# Patient Record
Sex: FEMALE | Race: WHITE | NOT HISPANIC OR LATINO | Employment: OTHER | URBAN - METROPOLITAN AREA
[De-identification: names, ages, dates, MRNs, and addresses within clinical notes are randomized per-mention and may not be internally consistent; named-entity substitution may affect disease eponyms.]

---

## 2023-10-30 ENCOUNTER — ANCILLARY PROCEDURE (OUTPATIENT)
Dept: RADIOLOGY | Facility: CLINIC | Age: 73
End: 2023-10-30
Payer: MEDICARE

## 2023-10-30 DIAGNOSIS — S72.001D FRACTURE OF UNSPECIFIED PART OF NECK OF RIGHT FEMUR, SUBSEQUENT ENCOUNTER FOR CLOSED FRACTURE WITH ROUTINE HEALING: ICD-10-CM

## 2023-10-30 PROCEDURE — 73110 X-RAY EXAM OF WRIST: CPT | Mod: RT

## 2023-10-30 PROCEDURE — 73502 X-RAY EXAM HIP UNI 2-3 VIEWS: CPT | Mod: RT

## 2023-10-30 PROCEDURE — 73552 X-RAY EXAM OF FEMUR 2/>: CPT | Mod: RT

## 2023-10-30 PROCEDURE — 73110 X-RAY EXAM OF WRIST: CPT | Mod: RIGHT SIDE | Performed by: RADIOLOGY

## 2023-10-30 PROCEDURE — 73502 X-RAY EXAM HIP UNI 2-3 VIEWS: CPT | Mod: RIGHT SIDE | Performed by: RADIOLOGY

## 2023-10-30 PROCEDURE — 73552 X-RAY EXAM OF FEMUR 2/>: CPT | Mod: RIGHT SIDE | Performed by: RADIOLOGY

## 2024-05-06 ENCOUNTER — LAB REQUISITION (OUTPATIENT)
Dept: LAB | Facility: HOSPITAL | Age: 74
End: 2024-05-06
Payer: MEDICARE

## 2024-05-06 DIAGNOSIS — E04.2 NONTOXIC MULTINODULAR GOITER: ICD-10-CM

## 2024-05-06 PROCEDURE — 88112 CYTOPATH CELL ENHANCE TECH: CPT

## 2024-05-06 PROCEDURE — 88112 CYTOPATH CELL ENHANCE TECH: CPT | Performed by: PATHOLOGY

## 2024-05-07 LAB
LABORATORY COMMENT REPORT: NORMAL
LABORATORY COMMENT REPORT: NORMAL
PATH REPORT.FINAL DX SPEC: NORMAL
PATH REPORT.GROSS SPEC: NORMAL
PATH REPORT.RELEVANT HX SPEC: NORMAL
PATH REPORT.TOTAL CANCER: NORMAL

## 2024-05-14 DIAGNOSIS — K22.719 BARRETT'S ESOPHAGUS WITH DYSPLASIA, UNSPECIFIED: Primary | ICD-10-CM

## 2024-06-17 ENCOUNTER — APPOINTMENT (OUTPATIENT)
Dept: GASTROENTEROLOGY | Facility: HOSPITAL | Age: 74
End: 2024-06-17
Payer: COMMERCIAL

## 2024-10-23 ENCOUNTER — LAB REQUISITION (OUTPATIENT)
Dept: LAB | Facility: HOSPITAL | Age: 74
End: 2024-10-23
Payer: COMMERCIAL

## 2024-10-23 DIAGNOSIS — J18.9 PNEUMONIA, UNSPECIFIED ORGANISM: ICD-10-CM

## 2024-10-23 PROCEDURE — 88342 IMHCHEM/IMCYTCHM 1ST ANTB: CPT | Performed by: PATHOLOGY

## 2024-10-23 PROCEDURE — 88342 IMHCHEM/IMCYTCHM 1ST ANTB: CPT

## 2024-10-23 PROCEDURE — 88112 CYTOPATH CELL ENHANCE TECH: CPT

## 2024-10-23 PROCEDURE — 88112 CYTOPATH CELL ENHANCE TECH: CPT | Mod: TC,MCY,OUT,ELYLAB | Performed by: INTERNAL MEDICINE

## 2024-10-23 PROCEDURE — 88341 IMHCHEM/IMCYTCHM EA ADD ANTB: CPT

## 2024-10-23 PROCEDURE — 88341 IMHCHEM/IMCYTCHM EA ADD ANTB: CPT | Performed by: PATHOLOGY

## 2024-10-23 PROCEDURE — 88305 TISSUE EXAM BY PATHOLOGIST: CPT | Performed by: PATHOLOGY

## 2024-10-23 PROCEDURE — 88305 TISSUE EXAM BY PATHOLOGIST: CPT

## 2024-10-23 PROCEDURE — 88112 CYTOPATH CELL ENHANCE TECH: CPT | Performed by: PATHOLOGY

## 2024-11-01 PROCEDURE — 88112 CYTOPATH CELL ENHANCE TECH: CPT | Mod: TC,OUT | Performed by: INTERNAL MEDICINE

## 2024-11-01 PROCEDURE — 88305 TISSUE EXAM BY PATHOLOGIST: CPT | Performed by: PATHOLOGY

## 2024-11-01 PROCEDURE — 88305 TISSUE EXAM BY PATHOLOGIST: CPT

## 2024-11-01 PROCEDURE — 88112 CYTOPATH CELL ENHANCE TECH: CPT

## 2024-11-01 PROCEDURE — 88112 CYTOPATH CELL ENHANCE TECH: CPT | Performed by: PATHOLOGY

## 2024-11-03 ENCOUNTER — LAB REQUISITION (OUTPATIENT)
Dept: LAB | Facility: HOSPITAL | Age: 74
End: 2024-11-03
Payer: MEDICARE

## 2024-11-03 DIAGNOSIS — J90 PLEURAL EFFUSION, NOT ELSEWHERE CLASSIFIED: ICD-10-CM

## 2024-11-07 LAB
LAB AP ASR DISCLAIMER: NORMAL
LABORATORY COMMENT REPORT: NORMAL
LABORATORY COMMENT REPORT: NORMAL
PATH REPORT.FINAL DX SPEC: NORMAL
PATH REPORT.GROSS SPEC: NORMAL
PATH REPORT.TOTAL CANCER: NORMAL

## 2024-12-27 PROBLEM — D64.9 ANEMIA: Status: ACTIVE | Noted: 2024-12-27

## 2024-12-28 ENCOUNTER — OFFICE VISIT (OUTPATIENT)
Dept: SURGICAL ONCOLOGY | Facility: HOSPITAL | Age: 74
DRG: 809 | End: 2024-12-28
Payer: COMMERCIAL

## 2024-12-28 ENCOUNTER — HOSPITAL ENCOUNTER (INPATIENT)
Facility: HOSPITAL | Age: 74
DRG: 809 | End: 2024-12-28
Attending: STUDENT IN AN ORGANIZED HEALTH CARE EDUCATION/TRAINING PROGRAM | Admitting: STUDENT IN AN ORGANIZED HEALTH CARE EDUCATION/TRAINING PROGRAM
Payer: COMMERCIAL

## 2024-12-28 ENCOUNTER — APPOINTMENT (OUTPATIENT)
Dept: RADIOLOGY | Facility: HOSPITAL | Age: 74
DRG: 809 | End: 2024-12-28
Payer: COMMERCIAL

## 2024-12-28 DIAGNOSIS — D64.9 ANEMIA: Primary | ICD-10-CM

## 2024-12-28 DIAGNOSIS — D61.818 PANCYTOPENIA: ICD-10-CM

## 2024-12-28 DIAGNOSIS — J90 PLEURAL EFFUSION: ICD-10-CM

## 2024-12-28 DIAGNOSIS — C34.90: Primary | ICD-10-CM

## 2024-12-28 LAB
ABO GROUP (TYPE) IN BLOOD: NORMAL
ALBUMIN SERPL BCP-MCNC: 3 G/DL (ref 3.4–5)
ALP SERPL-CCNC: 141 U/L (ref 33–136)
ALT SERPL W P-5'-P-CCNC: 13 U/L (ref 7–45)
ANION GAP SERPL CALC-SCNC: 13 MMOL/L (ref 10–20)
ANTIBODY SCREEN: NORMAL
APTT PPP: 29 SECONDS (ref 27–38)
AST SERPL W P-5'-P-CCNC: 11 U/L (ref 9–39)
BASOPHILS # BLD MANUAL: 0.01 X10*3/UL (ref 0–0.1)
BASOPHILS NFR BLD MANUAL: 1 %
BILIRUB DIRECT SERPL-MCNC: 0.2 MG/DL (ref 0–0.3)
BILIRUB SERPL-MCNC: 1.1 MG/DL (ref 0–1.2)
BUN SERPL-MCNC: 12 MG/DL (ref 6–23)
CALCIUM SERPL-MCNC: 7.4 MG/DL (ref 8.6–10.6)
CHLORIDE SERPL-SCNC: 92 MMOL/L (ref 98–107)
CO2 SERPL-SCNC: 31 MMOL/L (ref 21–32)
CREAT SERPL-MCNC: 0.62 MG/DL (ref 0.5–1.05)
EGFRCR SERPLBLD CKD-EPI 2021: >90 ML/MIN/1.73M*2
EOSINOPHIL # BLD MANUAL: 0.01 X10*3/UL (ref 0–0.4)
EOSINOPHIL NFR BLD MANUAL: 2 %
ERYTHROCYTE [DISTWIDTH] IN BLOOD BY AUTOMATED COUNT: 15.3 % (ref 11.5–14.5)
ERYTHROCYTE [DISTWIDTH] IN BLOOD BY AUTOMATED COUNT: 15.6 % (ref 11.5–14.5)
EST. AVERAGE GLUCOSE BLD GHB EST-MCNC: 143 MG/DL
GLUCOSE SERPL-MCNC: 127 MG/DL (ref 74–99)
HBA1C MFR BLD: 6.6 %
HCT VFR BLD AUTO: 22.4 % (ref 36–46)
HCT VFR BLD AUTO: 23.9 % (ref 36–46)
HGB BLD-MCNC: 7.5 G/DL (ref 12–16)
HGB BLD-MCNC: 8.6 G/DL (ref 12–16)
IMM GRANULOCYTES # BLD AUTO: 0 X10*3/UL (ref 0–0.5)
IMM GRANULOCYTES NFR BLD AUTO: 0 % (ref 0–0.9)
INR PPP: 1.2 (ref 0.9–1.1)
LYMPHOCYTES # BLD MANUAL: 0.43 X10*3/UL (ref 0.8–3)
LYMPHOCYTES NFR BLD MANUAL: 85.1 %
MCH RBC QN AUTO: 26.5 PG (ref 26–34)
MCH RBC QN AUTO: 26.7 PG (ref 26–34)
MCHC RBC AUTO-ENTMCNC: 33.5 G/DL (ref 32–36)
MCHC RBC AUTO-ENTMCNC: 36 G/DL (ref 32–36)
MCV RBC AUTO: 74 FL (ref 80–100)
MCV RBC AUTO: 79 FL (ref 80–100)
MONOCYTES # BLD MANUAL: 0.01 X10*3/UL (ref 0.05–0.8)
MONOCYTES NFR BLD MANUAL: 2 %
NEUTS SEG # BLD MANUAL: 0.04 X10*3/UL (ref 1.6–5)
NEUTS SEG NFR BLD MANUAL: 8.9 %
NRBC BLD-RTO: 0 /100 WBCS (ref 0–0)
NRBC BLD-RTO: 0 /100 WBCS (ref 0–0)
PHOSPHATE SERPL-MCNC: 2.6 MG/DL (ref 2.5–4.9)
PLATELET # BLD AUTO: 13 X10*3/UL (ref 150–450)
PLATELET # BLD AUTO: 19 X10*3/UL (ref 150–450)
POTASSIUM SERPL-SCNC: 3.4 MMOL/L (ref 3.5–5.3)
PROT SERPL-MCNC: 5.3 G/DL (ref 6.4–8.2)
PROTHROMBIN TIME: 13.2 SECONDS (ref 9.8–12.8)
RBC # BLD AUTO: 2.83 X10*6/UL (ref 4–5.2)
RBC # BLD AUTO: 3.22 X10*6/UL (ref 4–5.2)
RBC MORPH BLD: ABNORMAL
RH FACTOR (ANTIGEN D): NORMAL
SODIUM SERPL-SCNC: 133 MMOL/L (ref 136–145)
TOTAL CELLS COUNTED BLD: 101
VARIANT LYMPHS # BLD MANUAL: 0.01 X10*3/UL (ref 0–0.3)
VARIANT LYMPHS NFR BLD: 1 %
WBC # BLD AUTO: 0.5 X10*3/UL (ref 4.4–11.3)
WBC # BLD AUTO: 0.6 X10*3/UL (ref 4.4–11.3)

## 2024-12-28 PROCEDURE — 2500000001 HC RX 250 WO HCPCS SELF ADMINISTERED DRUGS (ALT 637 FOR MEDICARE OP)

## 2024-12-28 PROCEDURE — 80048 BASIC METABOLIC PNL TOTAL CA: CPT

## 2024-12-28 PROCEDURE — 93005 ELECTROCARDIOGRAM TRACING: CPT

## 2024-12-28 PROCEDURE — 86901 BLOOD TYPING SEROLOGIC RH(D): CPT

## 2024-12-28 PROCEDURE — 36415 COLL VENOUS BLD VENIPUNCTURE: CPT

## 2024-12-28 PROCEDURE — 82248 BILIRUBIN DIRECT: CPT

## 2024-12-28 PROCEDURE — 84100 ASSAY OF PHOSPHORUS: CPT

## 2024-12-28 PROCEDURE — 85027 COMPLETE CBC AUTOMATED: CPT

## 2024-12-28 PROCEDURE — 85610 PROTHROMBIN TIME: CPT

## 2024-12-28 PROCEDURE — 71045 X-RAY EXAM CHEST 1 VIEW: CPT | Performed by: STUDENT IN AN ORGANIZED HEALTH CARE EDUCATION/TRAINING PROGRAM

## 2024-12-28 PROCEDURE — 85007 BL SMEAR W/DIFF WBC COUNT: CPT

## 2024-12-28 PROCEDURE — 71045 X-RAY EXAM CHEST 1 VIEW: CPT

## 2024-12-28 PROCEDURE — 2500000002 HC RX 250 W HCPCS SELF ADMINISTERED DRUGS (ALT 637 FOR MEDICARE OP, ALT 636 FOR OP/ED)

## 2024-12-28 PROCEDURE — 99223 1ST HOSP IP/OBS HIGH 75: CPT | Performed by: NURSE PRACTITIONER

## 2024-12-28 PROCEDURE — 02HV33Z INSERTION OF INFUSION DEVICE INTO SUPERIOR VENA CAVA, PERCUTANEOUS APPROACH: ICD-10-PCS

## 2024-12-28 PROCEDURE — 83036 HEMOGLOBIN GLYCOSYLATED A1C: CPT

## 2024-12-28 PROCEDURE — 2780000003 HC OR 278 NO HCPCS

## 2024-12-28 PROCEDURE — 2500000004 HC RX 250 GENERAL PHARMACY W/ HCPCS (ALT 636 FOR OP/ED)

## 2024-12-28 PROCEDURE — 1200000003 HC ONCOLOGY  ROOM WITH TELEMETRY DAILY

## 2024-12-28 PROCEDURE — C1751 CATH, INF, PER/CENT/MIDLINE: HCPCS

## 2024-12-28 PROCEDURE — 93010 ELECTROCARDIOGRAM REPORT: CPT | Performed by: INTERNAL MEDICINE

## 2024-12-28 PROCEDURE — 99223 1ST HOSP IP/OBS HIGH 75: CPT | Performed by: STUDENT IN AN ORGANIZED HEALTH CARE EDUCATION/TRAINING PROGRAM

## 2024-12-28 PROCEDURE — 36410 VNPNXR 3YR/> PHY/QHP DX/THER: CPT

## 2024-12-28 RX ORDER — ONDANSETRON 4 MG/1
1 TABLET, FILM COATED ORAL EVERY 6 HOURS PRN
Status: ON HOLD | COMMUNITY
Start: 2024-11-13 | End: 2025-01-04

## 2024-12-28 RX ORDER — POTASSIUM CHLORIDE 20 MEQ/1
20 TABLET, EXTENDED RELEASE ORAL ONCE
Status: COMPLETED | OUTPATIENT
Start: 2024-12-28 | End: 2024-12-28

## 2024-12-28 RX ORDER — ALBUTEROL SULFATE 0.83 MG/ML
2.5 SOLUTION RESPIRATORY (INHALATION) EVERY 4 HOURS PRN
Status: DISCONTINUED | OUTPATIENT
Start: 2024-12-28 | End: 2025-01-04 | Stop reason: HOSPADM

## 2024-12-28 RX ORDER — ONDANSETRON 4 MG/1
4 TABLET, ORALLY DISINTEGRATING ORAL EVERY 8 HOURS PRN
Status: DISCONTINUED | OUTPATIENT
Start: 2024-12-28 | End: 2024-12-28

## 2024-12-28 RX ORDER — HYDROMORPHONE HYDROCHLORIDE 2 MG/1
2 TABLET ORAL EVERY 4 HOURS PRN
Status: DISCONTINUED | OUTPATIENT
Start: 2024-12-28 | End: 2024-12-28

## 2024-12-28 RX ORDER — OLANZAPINE 5 MG/1
2.5 TABLET ORAL NIGHTLY
Status: DISCONTINUED | OUTPATIENT
Start: 2024-12-28 | End: 2025-01-04 | Stop reason: HOSPADM

## 2024-12-28 RX ORDER — HYDROMORPHONE HYDROCHLORIDE 2 MG/1
2 TABLET ORAL ONCE
Status: COMPLETED | OUTPATIENT
Start: 2024-12-28 | End: 2024-12-28

## 2024-12-28 RX ORDER — DEXTROSE 50 % IN WATER (D50W) INTRAVENOUS SYRINGE
25
Status: DISCONTINUED | OUTPATIENT
Start: 2024-12-28 | End: 2025-01-04 | Stop reason: HOSPADM

## 2024-12-28 RX ORDER — INSULIN LISPRO 100 [IU]/ML
INJECTION, SOLUTION INTRAVENOUS; SUBCUTANEOUS
Status: ON HOLD | COMMUNITY
End: 2025-01-04

## 2024-12-28 RX ORDER — LOSARTAN POTASSIUM 25 MG/1
25 TABLET ORAL DAILY
Status: DISCONTINUED | OUTPATIENT
Start: 2024-12-28 | End: 2025-01-04 | Stop reason: HOSPADM

## 2024-12-28 RX ORDER — MIRTAZAPINE 15 MG/1
7.5 TABLET, FILM COATED ORAL NIGHTLY
Status: DISCONTINUED | OUTPATIENT
Start: 2024-12-28 | End: 2024-12-28

## 2024-12-28 RX ORDER — ONDANSETRON 4 MG/1
4 TABLET, ORALLY DISINTEGRATING ORAL EVERY 6 HOURS PRN
Status: DISCONTINUED | OUTPATIENT
Start: 2024-12-28 | End: 2025-01-04 | Stop reason: HOSPADM

## 2024-12-28 RX ORDER — ACYCLOVIR 400 MG/1
800 TABLET ORAL
Status: DISCONTINUED | OUTPATIENT
Start: 2024-12-28 | End: 2025-01-04 | Stop reason: HOSPADM

## 2024-12-28 RX ORDER — HYDROXYZINE HYDROCHLORIDE 25 MG/1
25 TABLET, FILM COATED ORAL EVERY 8 HOURS PRN
Status: DISCONTINUED | OUTPATIENT
Start: 2024-12-28 | End: 2025-01-04 | Stop reason: HOSPADM

## 2024-12-28 RX ORDER — PANTOPRAZOLE SODIUM 40 MG/1
40 TABLET, DELAYED RELEASE ORAL
Status: DISCONTINUED | OUTPATIENT
Start: 2024-12-28 | End: 2024-12-28

## 2024-12-28 RX ORDER — ACYCLOVIR 800 MG/1
800 TABLET ORAL DAILY
Status: ON HOLD | COMMUNITY
End: 2025-01-04

## 2024-12-28 RX ORDER — HEPARIN SODIUM 5000 [USP'U]/ML
5000 INJECTION, SOLUTION INTRAVENOUS; SUBCUTANEOUS EVERY 8 HOURS
Status: DISCONTINUED | OUTPATIENT
Start: 2024-12-28 | End: 2024-12-28

## 2024-12-28 RX ORDER — POTASSIUM CHLORIDE 14.9 MG/ML
20 INJECTION INTRAVENOUS ONCE
Status: DISCONTINUED | OUTPATIENT
Start: 2024-12-28 | End: 2024-12-28

## 2024-12-28 RX ORDER — PANTOPRAZOLE SODIUM 40 MG/10ML
40 INJECTION, POWDER, LYOPHILIZED, FOR SOLUTION INTRAVENOUS 2 TIMES DAILY
Status: DISCONTINUED | OUTPATIENT
Start: 2024-12-28 | End: 2024-12-28

## 2024-12-28 RX ORDER — OMEPRAZOLE 40 MG/1
40 CAPSULE, DELAYED RELEASE ORAL
Status: ON HOLD | COMMUNITY
End: 2025-01-04

## 2024-12-28 RX ORDER — BACLOFEN 5 MG/1
1 TABLET ORAL EVERY 8 HOURS PRN
Status: ON HOLD | COMMUNITY
Start: 2024-11-13 | End: 2025-01-04

## 2024-12-28 RX ORDER — INSULIN GLARGINE 100 [IU]/ML
30 INJECTION, SOLUTION SUBCUTANEOUS NIGHTLY
Status: ON HOLD | COMMUNITY
End: 2025-01-04

## 2024-12-28 RX ORDER — LORAZEPAM 0.5 MG/1
0.5 TABLET ORAL ONCE
Status: COMPLETED | OUTPATIENT
Start: 2024-12-28 | End: 2024-12-28

## 2024-12-28 RX ORDER — HYDROMORPHONE HYDROCHLORIDE 2 MG/1
2 TABLET ORAL EVERY 4 HOURS
Status: DISCONTINUED | OUTPATIENT
Start: 2024-12-28 | End: 2024-12-29

## 2024-12-28 RX ORDER — LIDOCAINE HYDROCHLORIDE 10 MG/ML
5 INJECTION, SOLUTION INFILTRATION; PERINEURAL ONCE
Status: DISCONTINUED | OUTPATIENT
Start: 2024-12-28 | End: 2024-12-31

## 2024-12-28 RX ORDER — HYDROMORPHONE HYDROCHLORIDE 2 MG/1
2 TABLET ORAL
Status: DISCONTINUED | OUTPATIENT
Start: 2024-12-28 | End: 2024-12-28

## 2024-12-28 RX ORDER — ALBUTEROL SULFATE 90 UG/1
2 INHALANT RESPIRATORY (INHALATION) EVERY 4 HOURS PRN
Status: ON HOLD | COMMUNITY
End: 2025-01-04

## 2024-12-28 RX ORDER — DEXTROSE 50 % IN WATER (D50W) INTRAVENOUS SYRINGE
12.5
Status: DISCONTINUED | OUTPATIENT
Start: 2024-12-28 | End: 2025-01-04 | Stop reason: HOSPADM

## 2024-12-28 RX ORDER — METHOCARBAMOL 500 MG/1
500 TABLET, FILM COATED ORAL EVERY 8 HOURS PRN
Status: DISCONTINUED | OUTPATIENT
Start: 2024-12-28 | End: 2025-01-04 | Stop reason: HOSPADM

## 2024-12-28 RX ORDER — AMLODIPINE BESYLATE 10 MG/1
1 TABLET ORAL DAILY
Status: ON HOLD | COMMUNITY
Start: 2024-11-14 | End: 2025-01-04

## 2024-12-28 RX ORDER — FUROSEMIDE 40 MG/1
40 TABLET ORAL
Status: DISCONTINUED | OUTPATIENT
Start: 2024-12-28 | End: 2024-12-28

## 2024-12-28 RX ORDER — LIDOCAINE 560 MG/1
2 PATCH PERCUTANEOUS; TOPICAL; TRANSDERMAL DAILY
Status: DISCONTINUED | OUTPATIENT
Start: 2024-12-28 | End: 2025-01-04 | Stop reason: HOSPADM

## 2024-12-28 RX ORDER — POLYETHYLENE GLYCOL 3350 17 G/17G
17 POWDER, FOR SOLUTION ORAL DAILY
Status: DISCONTINUED | OUTPATIENT
Start: 2024-12-28 | End: 2024-12-30

## 2024-12-28 RX ORDER — LOSARTAN POTASSIUM 25 MG/1
25 TABLET ORAL DAILY
Status: ON HOLD | COMMUNITY
End: 2025-01-04

## 2024-12-28 RX ORDER — PANTOPRAZOLE SODIUM 40 MG/1
40 TABLET, DELAYED RELEASE ORAL
Status: DISCONTINUED | OUTPATIENT
Start: 2024-12-29 | End: 2025-01-04 | Stop reason: HOSPADM

## 2024-12-28 RX ORDER — FUROSEMIDE 40 MG/1
40 TABLET ORAL ONCE
Status: DISCONTINUED | OUTPATIENT
Start: 2024-12-28 | End: 2024-12-28

## 2024-12-28 RX ORDER — HYDROMORPHONE HYDROCHLORIDE 1 MG/ML
0.4 INJECTION, SOLUTION INTRAMUSCULAR; INTRAVENOUS; SUBCUTANEOUS EVERY 4 HOURS PRN
Status: DISCONTINUED | OUTPATIENT
Start: 2024-12-28 | End: 2025-01-04 | Stop reason: HOSPADM

## 2024-12-28 RX ORDER — ASPIRIN 81 MG/1
81 TABLET ORAL DAILY
Status: ON HOLD | COMMUNITY
End: 2025-01-04

## 2024-12-28 RX ORDER — AMLODIPINE BESYLATE 10 MG/1
10 TABLET ORAL DAILY
Status: DISCONTINUED | OUTPATIENT
Start: 2024-12-29 | End: 2025-01-04 | Stop reason: HOSPADM

## 2024-12-28 RX ORDER — HYDRALAZINE HYDROCHLORIDE 10 MG/1
10 TABLET, FILM COATED ORAL 3 TIMES DAILY
Status: DISCONTINUED | OUTPATIENT
Start: 2024-12-28 | End: 2024-12-28

## 2024-12-28 RX ORDER — LABETALOL 200 MG/1
200 TABLET, FILM COATED ORAL 2 TIMES DAILY
Status: DISCONTINUED | OUTPATIENT
Start: 2024-12-28 | End: 2025-01-04 | Stop reason: HOSPADM

## 2024-12-28 RX ORDER — BACLOFEN 10 MG/1
5 TABLET ORAL EVERY 8 HOURS PRN
Status: DISCONTINUED | OUTPATIENT
Start: 2024-12-28 | End: 2024-12-28

## 2024-12-28 RX ORDER — LABETALOL 200 MG/1
1 TABLET, FILM COATED ORAL
Status: ON HOLD | COMMUNITY
Start: 2024-10-11 | End: 2025-01-04

## 2024-12-28 RX ORDER — ACETAMINOPHEN 500 MG
5 TABLET ORAL DAILY
Status: DISCONTINUED | OUTPATIENT
Start: 2024-12-28 | End: 2025-01-04 | Stop reason: HOSPADM

## 2024-12-28 RX ORDER — HYDROMORPHONE HYDROCHLORIDE 2 MG/1
2 TABLET ORAL
Status: ON HOLD | COMMUNITY
End: 2025-01-04

## 2024-12-28 RX ORDER — FUROSEMIDE 20 MG/1
2 TABLET ORAL
Status: ON HOLD | COMMUNITY
Start: 2024-10-11 | End: 2025-01-04

## 2024-12-28 RX ORDER — FUROSEMIDE 40 MG/1
40 TABLET ORAL DAILY
Status: DISCONTINUED | OUTPATIENT
Start: 2024-12-29 | End: 2024-12-31

## 2024-12-28 RX ORDER — ACETAMINOPHEN 500 MG
1 TABLET ORAL EVERY 24 HOURS
Status: ON HOLD | COMMUNITY
End: 2025-01-04

## 2024-12-28 RX ORDER — INSULIN LISPRO 100 [IU]/ML
0-5 INJECTION, SOLUTION INTRAVENOUS; SUBCUTANEOUS
Status: DISCONTINUED | OUTPATIENT
Start: 2024-12-28 | End: 2025-01-04 | Stop reason: HOSPADM

## 2024-12-28 RX ORDER — CALCIUM CARBONATE/VITAMIN D3 600MG-5MCG
1 TABLET ORAL 2 TIMES DAILY
Status: ON HOLD | COMMUNITY
End: 2025-01-04

## 2024-12-28 RX ORDER — INSULIN GLARGINE 100 [IU]/ML
15 INJECTION, SOLUTION SUBCUTANEOUS NIGHTLY
Status: DISCONTINUED | OUTPATIENT
Start: 2024-12-28 | End: 2024-12-31 | Stop reason: ALTCHOICE

## 2024-12-28 RX ADMIN — PANTOPRAZOLE SODIUM 40 MG: 40 INJECTION, POWDER, FOR SOLUTION INTRAVENOUS at 08:03

## 2024-12-28 RX ADMIN — HYDROMORPHONE HYDROCHLORIDE 2 MG: 2 TABLET ORAL at 02:10

## 2024-12-28 RX ADMIN — Medication 5 MG: at 02:42

## 2024-12-28 RX ADMIN — FUROSEMIDE 40 MG: 40 TABLET ORAL at 07:56

## 2024-12-28 RX ADMIN — Medication 5 MG: at 18:40

## 2024-12-28 RX ADMIN — POTASSIUM CHLORIDE 20 MEQ: 1500 TABLET, EXTENDED RELEASE ORAL at 10:00

## 2024-12-28 RX ADMIN — HYDROMORPHONE HYDROCHLORIDE 2 MG: 2 TABLET ORAL at 11:58

## 2024-12-28 RX ADMIN — ONDANSETRON 4 MG: 4 TABLET, ORALLY DISINTEGRATING ORAL at 12:33

## 2024-12-28 RX ADMIN — LABETALOL HYDROCHLORIDE 200 MG: 200 TABLET, FILM COATED ORAL at 08:08

## 2024-12-28 RX ADMIN — LORAZEPAM 0.5 MG: 0.5 TABLET ORAL at 17:27

## 2024-12-28 RX ADMIN — LABETALOL HYDROCHLORIDE 200 MG: 200 TABLET, FILM COATED ORAL at 21:40

## 2024-12-28 RX ADMIN — HYDROMORPHONE HYDROCHLORIDE 2 MG: 2 TABLET ORAL at 10:00

## 2024-12-28 RX ADMIN — LOSARTAN POTASSIUM 25 MG: 25 TABLET, FILM COATED ORAL at 08:07

## 2024-12-28 RX ADMIN — POTASSIUM CHLORIDE 20 MEQ: 14.9 INJECTION, SOLUTION INTRAVENOUS at 07:56

## 2024-12-28 SDOH — SOCIAL STABILITY: SOCIAL INSECURITY: DO YOU FEEL UNSAFE GOING BACK TO THE PLACE WHERE YOU ARE LIVING?: NO

## 2024-12-28 SDOH — SOCIAL STABILITY: SOCIAL INSECURITY
WITHIN THE LAST YEAR, HAVE YOU BEEN KICKED, HIT, SLAPPED, OR OTHERWISE PHYSICALLY HURT BY YOUR PARTNER OR EX-PARTNER?: NO

## 2024-12-28 SDOH — SOCIAL STABILITY: SOCIAL INSECURITY: DOES ANYONE TRY TO KEEP YOU FROM HAVING/CONTACTING OTHER FRIENDS OR DOING THINGS OUTSIDE YOUR HOME?: NO

## 2024-12-28 SDOH — SOCIAL STABILITY: SOCIAL INSECURITY: HAS ANYONE EVER THREATENED TO HURT YOUR FAMILY OR YOUR PETS?: NO

## 2024-12-28 SDOH — SOCIAL STABILITY: SOCIAL INSECURITY
WITHIN THE LAST YEAR, HAVE YOU BEEN RAPED OR FORCED TO HAVE ANY KIND OF SEXUAL ACTIVITY BY YOUR PARTNER OR EX-PARTNER?: NO

## 2024-12-28 SDOH — ECONOMIC STABILITY: FOOD INSECURITY: WITHIN THE PAST 12 MONTHS, YOU WORRIED THAT YOUR FOOD WOULD RUN OUT BEFORE YOU GOT THE MONEY TO BUY MORE.: NEVER TRUE

## 2024-12-28 SDOH — SOCIAL STABILITY: SOCIAL INSECURITY: DO YOU FEEL ANYONE HAS EXPLOITED OR TAKEN ADVANTAGE OF YOU FINANCIALLY OR OF YOUR PERSONAL PROPERTY?: NO

## 2024-12-28 SDOH — ECONOMIC STABILITY: INCOME INSECURITY: IN THE PAST 12 MONTHS HAS THE ELECTRIC, GAS, OIL, OR WATER COMPANY THREATENED TO SHUT OFF SERVICES IN YOUR HOME?: NO

## 2024-12-28 SDOH — SOCIAL STABILITY: SOCIAL INSECURITY: HAVE YOU HAD ANY THOUGHTS OF HARMING ANYONE ELSE?: NO

## 2024-12-28 SDOH — SOCIAL STABILITY: SOCIAL INSECURITY: WERE YOU ABLE TO COMPLETE ALL THE BEHAVIORAL HEALTH SCREENINGS?: YES

## 2024-12-28 SDOH — SOCIAL STABILITY: SOCIAL INSECURITY: ARE THERE ANY APPARENT SIGNS OF INJURIES/BEHAVIORS THAT COULD BE RELATED TO ABUSE/NEGLECT?: NO

## 2024-12-28 SDOH — SOCIAL STABILITY: SOCIAL INSECURITY: WITHIN THE LAST YEAR, HAVE YOU BEEN HUMILIATED OR EMOTIONALLY ABUSED IN OTHER WAYS BY YOUR PARTNER OR EX-PARTNER?: NO

## 2024-12-28 SDOH — SOCIAL STABILITY: SOCIAL INSECURITY: ABUSE: ADULT

## 2024-12-28 SDOH — ECONOMIC STABILITY: FOOD INSECURITY: WITHIN THE PAST 12 MONTHS, THE FOOD YOU BOUGHT JUST DIDN'T LAST AND YOU DIDN'T HAVE MONEY TO GET MORE.: NEVER TRUE

## 2024-12-28 SDOH — SOCIAL STABILITY: SOCIAL INSECURITY: WITHIN THE LAST YEAR, HAVE YOU BEEN AFRAID OF YOUR PARTNER OR EX-PARTNER?: NO

## 2024-12-28 SDOH — SOCIAL STABILITY: SOCIAL INSECURITY: HAVE YOU HAD THOUGHTS OF HARMING ANYONE ELSE?: NO

## 2024-12-28 SDOH — SOCIAL STABILITY: SOCIAL INSECURITY: ARE YOU OR HAVE YOU BEEN THREATENED OR ABUSED PHYSICALLY, EMOTIONALLY, OR SEXUALLY BY ANYONE?: NO

## 2024-12-28 ASSESSMENT — COGNITIVE AND FUNCTIONAL STATUS - GENERAL
CLIMB 3 TO 5 STEPS WITH RAILING: TOTAL
MOVING TO AND FROM BED TO CHAIR: A LOT
TOILETING: A LITTLE
DAILY ACTIVITIY SCORE: 20
DRESSING REGULAR LOWER BODY CLOTHING: A LITTLE
CLIMB 3 TO 5 STEPS WITH RAILING: TOTAL
DAILY ACTIVITIY SCORE: 18
MOBILITY SCORE: 13
HELP NEEDED FOR BATHING: A LOT
TURNING FROM BACK TO SIDE WHILE IN FLAT BAD: A LITTLE
STANDING UP FROM CHAIR USING ARMS: A LOT
TOILETING: A LOT
DRESSING REGULAR UPPER BODY CLOTHING: A LITTLE
STANDING UP FROM CHAIR USING ARMS: A LOT
TURNING FROM BACK TO SIDE WHILE IN FLAT BAD: A LITTLE
MOVING FROM LYING ON BACK TO SITTING ON SIDE OF FLAT BED WITH BEDRAILS: A LITTLE
TOILETING: A LOT
MOBILITY SCORE: 14
MOVING FROM LYING ON BACK TO SITTING ON SIDE OF FLAT BED WITH BEDRAILS: A LITTLE
STANDING UP FROM CHAIR USING ARMS: A LOT
DRESSING REGULAR UPPER BODY CLOTHING: A LITTLE
HELP NEEDED FOR BATHING: A LITTLE
HELP NEEDED FOR BATHING: A LOT
WALKING IN HOSPITAL ROOM: A LOT
DRESSING REGULAR UPPER BODY CLOTHING: A LITTLE
DAILY ACTIVITIY SCORE: 18
CLIMB 3 TO 5 STEPS WITH RAILING: A LOT
DRESSING REGULAR LOWER BODY CLOTHING: A LITTLE
PATIENT BASELINE BEDBOUND: NO
TURNING FROM BACK TO SIDE WHILE IN FLAT BAD: A LITTLE
MOVING FROM LYING ON BACK TO SITTING ON SIDE OF FLAT BED WITH BEDRAILS: A LITTLE
MOVING TO AND FROM BED TO CHAIR: A LOT
MOBILITY SCORE: 13
MOVING TO AND FROM BED TO CHAIR: A LOT
WALKING IN HOSPITAL ROOM: A LOT
WALKING IN HOSPITAL ROOM: A LOT
DRESSING REGULAR LOWER BODY CLOTHING: A LITTLE

## 2024-12-28 ASSESSMENT — ACTIVITIES OF DAILY LIVING (ADL)
FEEDING YOURSELF: INDEPENDENT
HEARING - LEFT EAR: FUNCTIONAL
HEARING - RIGHT EAR: FUNCTIONAL
JUDGMENT_ADEQUATE_SAFELY_COMPLETE_DAILY_ACTIVITIES: YES
DRESSING YOURSELF: INDEPENDENT
GROOMING: INDEPENDENT
WALKS IN HOME: NEEDS ASSISTANCE
TOILETING: NEEDS ASSISTANCE
BATHING: NEEDS ASSISTANCE
ADEQUATE_TO_COMPLETE_ADL: YES
LACK_OF_TRANSPORTATION: NO
PATIENT'S MEMORY ADEQUATE TO SAFELY COMPLETE DAILY ACTIVITIES?: YES

## 2024-12-28 ASSESSMENT — PAIN - FUNCTIONAL ASSESSMENT
PAIN_FUNCTIONAL_ASSESSMENT: 0-10

## 2024-12-28 ASSESSMENT — COLUMBIA-SUICIDE SEVERITY RATING SCALE - C-SSRS
2. HAVE YOU ACTUALLY HAD ANY THOUGHTS OF KILLING YOURSELF?: NO
6. HAVE YOU EVER DONE ANYTHING, STARTED TO DO ANYTHING, OR PREPARED TO DO ANYTHING TO END YOUR LIFE?: NO
1. IN THE PAST MONTH, HAVE YOU WISHED YOU WERE DEAD OR WISHED YOU COULD GO TO SLEEP AND NOT WAKE UP?: NO

## 2024-12-28 ASSESSMENT — PAIN DESCRIPTION - LOCATION: LOCATION: GENERALIZED

## 2024-12-28 ASSESSMENT — PATIENT HEALTH QUESTIONNAIRE - PHQ9
2. FEELING DOWN, DEPRESSED OR HOPELESS: NEARLY EVERY DAY
1. LITTLE INTEREST OR PLEASURE IN DOING THINGS: NEARLY EVERY DAY
SUM OF ALL RESPONSES TO PHQ9 QUESTIONS 1 & 2: 6

## 2024-12-28 ASSESSMENT — LIFESTYLE VARIABLES
HOW MANY STANDARD DRINKS CONTAINING ALCOHOL DO YOU HAVE ON A TYPICAL DAY: PATIENT DOES NOT DRINK
AUDIT-C TOTAL SCORE: 0
SKIP TO QUESTIONS 9-10: 1
HOW OFTEN DO YOU HAVE 6 OR MORE DRINKS ON ONE OCCASION: NEVER
HOW OFTEN DO YOU HAVE A DRINK CONTAINING ALCOHOL: NEVER
AUDIT-C TOTAL SCORE: 0

## 2024-12-28 ASSESSMENT — PAIN SCALES - GENERAL
PAINLEVEL_OUTOF10: 8
PAINLEVEL_OUTOF10: 9
PAINLEVEL_OUTOF10: 7

## 2024-12-28 NOTE — SIGNIFICANT EVENT
Patient admitted w/ pancytopenia, recent Hgb 5.7 s/p 2 units RBC. Admission Hgb 7.5. Platelets 19.    Patient PIV infiltrated. Several attempts for PIV in forearm and hand by IV team.    Patient would benefit from midline placement due to need for IV access in setting of low Hgb and platelets and need for transfusions and frequent lab draws.    Patient and team understand the risk of midline placement with thrombocytopenia. Benefits of midline placement outweigh risks of bleeding.

## 2024-12-28 NOTE — CONSULTS
SUPPORTIVE AND PALLIATIVE ONCOLOGY CONSULT    Inpatient consult to [facility] Adult Supportive Oncology  Consult performed by: Kathy Zimmerman, APRN-CNP, DNP  Consult ordered by: Oscar Wang MD PhD        SERVICE DATE: 2024      PALLIATIVE MEDICINE OUTPATIENT PROVIDER:  None  CURRENT ATTENDING PROVIDER: Oscar Wang MD PhD     Medical Oncologist: Oscar Wang MD PhD   Radiation Oncologist: No care team member to display  Primary Physician: Kevin Orantes  [phone]    REASON FOR CONSULT/CHIEF CONSULT COMPLAINT: pain management, Introduction to Supportive and Palliative Oncology Services, and goals of care discussion    Subjective   HISTORY OF PRESENT ILLNESS: Alejandra Smith is a 74 y.o. female diagnosed with metastatic SCLC (liver mets) s/p left pleural drain s/p 2 Cycles chemotherapy at [facility]. PMH significant for sjogrens, SLE, T2DM, HTN, Afib, YOVANA, MDD, GABBI, RLS, RA, fibromyalgia. Presented to [facility] for fatigue, nausea, anemia, transferred to [facility] 2024 for further evaluation and management. Course complicated by pancytopenia. Supportive and Palliative Oncology is consulted for pain and symptom management, Introduction to Supportive and Palliative Oncology Services, and goals of care discussion.     Pt seen with sister and brother at bedside.   Reports pain currently controlled. Recently at SNF has only needed pain medication 1-2 times per day. Does have difficulty getting comfortable to due back pain and SOB, cannot lay flat.   Reports pleurx had been draining 500 ml every other day until the past week when some days there was no drainage and yesterday there was 150 ml.  Overall fatigued, appetite down.     Pain Assessment:  Onset:   chronic and months for chest pain  Location:  left lung/pleurX, RUQ, lower back, hips  Duration: Constant  Characteristics:   Ratin   Descriptors: aching, sharp, and spasms near CT site   Aggravating: movement and lying down     Relieving: Analgesics, Positioning, and Modifying activity   Intolerances:Alejandra Smith is allergic to keflex [cephalexin], metformin, chantix [varenicline], and levofloxacin.   Personal Pain Goal: 3    Interference with Function: Very Much   Barriers to Pain Management: None    Opioid Requirements  Past 24 h opioid requirements (12/28/24 at 0200 to 12/28/24 at 0800):   Hydromorphone IR 2 mg PO x 2 doses = 4 mg = 20 OME    Total 24h OME use:  20 OME      OARRS/PDMP reviewed 12/28/24 no aberrant behavior noted.    Symptom Assessment:  Pain:very much   Headache: none  Dizziness:none  Lack of energy: very much  Difficulty sleeping: a little  Worrying: none  Anxiety: none  Depressive symptoms/low mood: none  Pain in mouth/swallowing: none  Dry mouth: none  Taste changes: a little  Shortness of breath: very much  Lack of appetite: very much   Nausea: a little  Vomiting: none  Constipation: none  Diarrhea: none  Sore muscles: somewhat  Numbness or tingling in hands/feet/other: a little  Weight loss: none  Other: none      Information obtained from: chart review, interview of patient, interview of family, and discussion with primary team  ______________________________________________________________________     Oncology History    No history exists.       No past medical history on file.  No past surgical history on file.  No family history on file.     SOCIAL HISTORY:  Marital Status  and Support system siblings   Social History:  reports that she quit smoking about 18 months ago. Her smoking use included cigarettes. She has never used smokeless tobacco.    Faith and Importance of Faith:  Catholicism      REVIEW OF SYSTEMS:  Review of systems negative unless noted in HPI.       Objective       Lab Results   Component Value Date    WBC 0.6 (LL) 12/28/2024    HGB 7.5 (L) 12/28/2024    HCT 22.4 (L) 12/28/2024    MCV 79 (L) 12/28/2024    PLT 19 (LL) 12/28/2024      Lab Results   Component Value Date     GLUCOSE 127 (H) 12/28/2024    CALCIUM 7.4 (L) 12/28/2024     (L) 12/28/2024    K 3.4 (L) 12/28/2024    CO2 31 12/28/2024    CL 92 (L) 12/28/2024    BUN 12 12/28/2024    CREATININE 0.62 12/28/2024     Lab Results   Component Value Date    ALT 13 12/28/2024    AST 11 12/28/2024    ALKPHOS 141 (H) 12/28/2024    BILITOT 1.1 12/28/2024     CrCl cannot be calculated (Unknown ideal weight.).     No results found for this or any previous visit (from the past 4464 hours).  Wt Readings from Last 5 Encounters:   No data found for Wt       No current outpatient medications  Scheduled medications   acyclovir, 800 mg, oral, q24h KIMMY  [START ON 12/29/2024] furosemide, 40 mg, oral, Daily  HYDROmorphone, 2 mg, oral, q2h  insulin glargine, 15 Units, subcutaneous, Nightly  insulin lispro, 0-5 Units, subcutaneous, TID AC  labetalol, 200 mg, oral, BID  losartan, 25 mg, oral, Daily  melatonin, 5 mg, oral, Daily  [START ON 12/29/2024] pantoprazole, 40 mg, oral, Daily before breakfast  polyethylene glycol, 17 g, oral, Daily      Continuous medications     PRN medications  dextrose, 12.5 g, q15 min PRN  dextrose, 25 g, q15 min PRN  glucagon, 1 mg, q15 min PRN  glucagon, 1 mg, q15 min PRN         Allergies:   Allergies   Allergen Reactions    Keflex [Cephalexin] Unknown    Metformin Diarrhea and Unknown     severe diarrhea    Chantix [Varenicline] Unknown    Levofloxacin Unknown and Hives     IV Levaquin                PHYSICAL EXAMINATION:  Vital Signs:   Vital signs reviewed  Vitals:    12/28/24 1007   BP: 148/78   Pulse:    Resp:    Temp:    SpO2:      Pain Score: 8     Physical Exam  Vitals reviewed.   Constitutional:       General: She is not in acute distress.     Appearance: She is ill-appearing.   HENT:      Head: Normocephalic and atraumatic.      Mouth/Throat:      Mouth: Mucous membranes are moist.   Eyes:      Conjunctiva/sclera: Conjunctivae normal.   Pulmonary:      Effort: Pulmonary effort is normal. No respiratory  distress.   Abdominal:      General: There is no distension.   Musculoskeletal:         General: Normal range of motion.   Skin:     General: Skin is warm and dry.      Coloration: Skin is pale.   Neurological:      General: No focal deficit present.      Mental Status: She is alert and oriented to person, place, and time.   Psychiatric:         Mood and Affect: Mood normal.         Behavior: Behavior normal.         Thought Content: Thought content normal.         Judgment: Judgment normal.         ASSESSMENT/PLAN:  Alejandra Smith is a 74 y.o. female diagnosed with metastatic SCLC (liver mets) s/p left pleural drain s/p 2 Cycles chemotherapy at [facility]. PMH significant for sjogrens, SLE, T2DM, HTN, Afib, YOVANA, MDD, GABBI, RLS, RA, fibromyalgia. Presented to [facility] for fatigue, nausea, anemia[facility] evaluation and management. Course complicated by pancytopenia. Supportive and Palliative Oncology is consulted for pain and symptom management, Introduction to Supportive and Palliative Oncology Services, and goals of care discussion.     Symptom Management Plan:  Recommended changes are bolded    Pain:  Cancer related pain: left chest pain related to lung cancer, pleurx, rib  , somatic and neuropathic, controlled  Chronic pain:  generalized pain, hips, back related to RA fibromyalgia , somatic and neuropathic, controlled  Home regimen:  Hydromorphone 2 mg prn  Intolerances/previously tried: Duloxetine, baclofen (felt hot)   Change Hydromorphone IR to 2 mg q4h scheduled hold for sedation  Start  Hydromorphone 0.4 mg IV q4h prn breakthrough pain   Start lidocaine TD to back and around left CT site  Start Methocarbamol 500 mg PO q8h prn muscle spasms     Nausea:  Intermittent nausea without vomiting related to chemotherapy and opioids   Home regimen:  Ondansetron   QTc:  no recent EKG documented  Improving  Start Ondansetron 4 mg IV/PO q6h prn nausea  first line   Start Olanzapine 2.5 mg PO at bedtime for nausea, appetite, sleep, mood    Constipation  At risk for constipation related to medication side effects (including opioids and ondansetron) and decreased oral intake, currently not constipated  Usual bowel pattern:  unknown  Home regimen:  unknown*  LBM unknown  Continue polyethylene glycol 17 gm PO daily   Start sennosides-docusate 2 tablets PO BID prn  Goal to have BM without straining q48-72h, adjust regimen as needed    Altered Mood:  History of MDD/GABBI, currently denies  Home regimen: none  Declines need for medication    Sleeping Difficulty:  Impaired sleep related to pain and hospital environment  Home regimen:   melatonin  Start Olanzapine 2.5 mg PO at bedtime for nausea, appetite, sleep, mood  Continue melatonin 5 mg PO qhs    Decreased appetite:  Appetite loss related to malignancy, chemotherapy, and disease process  Home regimen:  none  Nutrition consult as appropriate  Pain management as above  Start Olanzapine 2.5 mg PO at bedtime for nausea, appetite, sleep, mood    Medical Decision Making/Goals of Care/Advance Care Planning:  Patient's current clinical condition, including diagnosis, prognosis, and management plan, and goals of care were discussed.   Life limiting disease: metastatic malignancy  Family: Supportive siblings  Performance status: Moderate limitations due to pain and fatigue  Joys/meaning/strength: [place]  Understanding of health: Demonstrates good prognostic understanding of disease process, understands plan for additional chemotherapy    Advance Directives  Existence of Advance Directives:Yes, documentation or copy in medical record  Decision maker: GABRIELA is sister Lidia Calix  Code Status: Full code    Introduction to Supportive and Palliative Oncology:  Spoke with patient and siblings at bedside  Introduced the role and philosophy of Supportive and Palliative oncology in the evaluation and management of symptoms during  cancer treatment  Palliative care was introduced as a service for patients with serious illness to help with symptoms, assist with goals of care conversations, navigate complex decision making, improve quality of life for patients, and provide support both patients and families.  Patient seemed to appreciate the extra layer of support.     Supportive Interventions: Interventions: Music Therapy: offered declined, Art Therapy: offered declined, SPO Spiritual Care: offered declined    Disposition:  Please  start the process of having prior authorization with meds to beds deliver medications to patient prior to discharge via [facility] pharmacy. Prescriptions will need to be sent 48-72 hours prior to discharge so that a prior authorization can be completed.     Discharge date: unknown pending acute issues  Will assess if patient needs an appointment with Outpatient Supportive Oncology at discharge      Signature and billing:    Medical complexity was high level due to due to complexity of problems, extensive data review, and high risk of management/treatment.        DATA   Diagnostic tests and information reviewed for today's visit:  Conversation with primary team, Most recent labs and imaging results, Medications         Plan of Care discussed with: Provider and Patient    Thank you for asking Supportive and Palliative Oncology to assist with care of this patient.  Recommendations will be communicated back to the consulting service by way of shared electronic medical record/secure chat/email or face-to-face.   We will continue to follow.  Please contact us for additional questions or concerns.      SIGNATURE: GHULAM Anglin-CNP, DNP  PAGER/CONTACT:  Contact information:  Supportive and Palliative Oncology  Monday-Friday 8 AM-5 PM  Epic Secure chat or pager [phone].  After hours and weekends:  pager [phone]

## 2024-12-28 NOTE — PROGRESS NOTES
Pharmacy Medication History Review    Alejandra Smith is a 74 y.o. female admitted for Anemia. Pharmacy reviewed the patient's [id number] medications and allergies for accuracy.    Medications ADDED:  ALL MEDICATIONS ADDED  Medications CHANGED:  none  Medications REMOVED:   none     The list below reflects the updated PTA list.   Prior to Admission Medications   Prescriptions Last Dose Informant   HYDROmorphone (Dilaudid) 2 mg tablet  Self   Sig: Take 1 tablet (2 mg) by mouth every 3 hours if needed for severe pain (7 - 10).   acyclovir (Zovirax) 800 mg tablet  Self   Sig: Take 1 tablet (800 mg) by mouth once daily.   albuterol 90 mcg/actuation inhaler  Self   Sig: Inhale 2 puffs every 4 hours if needed for wheezing or shortness of breath.   amLODIPine (Norvasc) 10 mg tablet  Self   Sig: Take 1 tablet (10 mg) by mouth once daily.   aspirin 81 mg EC tablet  Self   Sig: Take 1 tablet (81 mg) by mouth once daily.   baclofen (Lioresal) 5 mg tablet  Self   Sig: Take 1 tablet (5 mg) by mouth every 8 hours if needed for muscle spasms.   calcium carbonate-vitamin D3 (Calcium 600 + D,3,) 600 mg-5 mcg (200 unit) tablet  Self   Si tablet 2 times a day.   cholecalciferol (Vitamin D-3) 50 mcg (2,000 unit) capsule  Self   Sig: Take 1 capsule (50 mcg) by mouth once every 24 hours.   furosemide (Lasix) 20 mg tablet  Self   Sig: Take 2 tablets (40 mg) by mouth early in the morning..   insulin glargine (Lantus U-100 Insulin) 100 unit/mL injection  Self   Sig: Inject 30 Units under the skin once daily at bedtime. Take as directed per insulin instructions.   insulin lispro 100 unit/mL injection  Self   Sig: Inject under the skin 3 times daily (morning, midday, late afternoon). As directed by sliding scale   labetalol (Normodyne) 200 mg tablet  Self   Sig: Take 1 tablet (200 mg) by mouth every 12 hours.   losartan (Cozaar) 25 mg tablet  Self   Sig: Take 1 tablet (25 mg) by mouth once daily.   omeprazole (PriLOSEC) 40 mg  " capsule  Self   Sig: Take 1 capsule (40 mg) by mouth 2 times a day before meals. Do not crush or chew.   ondansetron (Zofran) 4 mg tablet  Self   Sig: Take 1 tablet (4 mg) by mouth every 6 hours if needed for nausea or vomiting.   vits A,C,E/lutein/minerals (OCUVITE WITH LUTEIN ORAL)  Self   Sig: Take 1 tablet by mouth once daily.      Facility-Administered Medications: None        The list below reflects the updated allergy list. Please review each documented allergy for additional clarification and justification.  Allergies  Reviewed by Nano BautistaD on 12/28/2024        Severity Reactions Comments    Keflex [cephalexin] High Unknown     Metformin Medium Diarrhea, Unknown severe diarrhea    Chantix [varenicline] Not Specified Unknown     Cymbalta [duloxetine] Not Specified Unknown     Hydralazine Not Specified Hives     Levofloxacin Not Specified Unknown, Hives IV Levaquin    Sulfasalazine Not Specified Headache, Nausea/vomiting             Patient declines M2B at discharge.     Sources:   [facility]  Pharmacy dispense history  Patient is from a SNF, so history is somewhat inaccurate   Patient interview Moderate historian  Knew most medications with limited prompting  Care Everywhere  Dr. Danielson's note from 12/18    Additional Comments:  Please note that med list is obtained from patient. She currently resides at [facility] [phone]. Pharmacy has attempted to obtain med list form facility but has been unsuccessful. Will update med list when able      Kadi Daniels PharmD  Transitions of Care Pharmacist  12/28/24     Secure Chat preferred   If no response call [phone] or [place] \"Med Rec\"      "

## 2024-12-28 NOTE — PROCEDURES
Pre-Procedure Checklist:  Emergent Line Insertion: No  Type of Line to be Placed: Midline  Consent Obtained: Yes  Emergency Medication Necessary: No  Patient Identified with 2 Independent Identifiers: Yes  Review of Allergies, Anticoagulation, Relevant Labs, ECG/Telemetry: Yes  Risks/Benefits/Alternatives Discussed with Patient/POA/Legal Representative: Yes  Stop Sign on Door: Yes  Time Out Performed: Yes  Catheter Exchange: No    Positioning Checklist:  All People, Including Patient, in the Room with Cap and Mask: Yes  Fluoroscopy Used to Identify Vessel and Guide Insertion: No   Sterile Cover Used: Yes  Full Barrier Precautions Followed (Mask, Cap, Gown, Gloves): Yes  Hands Washed: Yes  Monitors Attached with Sound Alarms On: No  Full Body Sterile Drape (Head-to-Toe) Used to Cover Patient: Yes  Trendelenburg Position (For IJ and Subclavian): No  CHG Skin Prep Used and Allowed to Air Dry to Skin Procedure: Yes    Procedure Checklist:  Blood Aspirated From All Lumens, All Ports Subsequently Flushed: Yes  Catheter Caps Placed on All Lumens; Lumens Clamped: Yes  Maintain Guidewire Control Throughout, Ensuring Guidewire Removal: Yes  Maintain Sterile Field Throughout Insertion: Yes  Catheter Secured: Yes  Confirmatory Test of Venous Placement: Non-Pulsatile Blood    Post Procedure Checklist:  Date and Time Written on Dressing: Yes  Sharp and Wire Count and Safe Disposal of all Sharps/Wires: Yes  Sterile Dressing Applied Per Protocol: Yes  X-ray Ordered or ECG Image: n/a    PICC Insertion Details:  Size (Fr): 3  Lumen Type: single  Catheter to Vein Ratio Less Than 50%: Yes  Total Length (cm): 15  External Length (cm): 0  Orientation: (left or right) right  Site Prep: Chlorohexidine; Usual sterile procedure followed  Local Anesthetic: Injectable/Subcutaneous  Insertion Team Members in the Room: Nurse, LPN  Initial Extremity Circumference (cm): 32  Insertion Attempts: 1  Patient Tolerance: Tolerated Well, Age  Appropriate  Comfort Measures: Subcutaneous anesthetic; Verbal  Procedure Location: Bedside  Safety Measures: Patient specific safety measures addressed with RN  Estimated Blood Loss (mL):   Vessel Fully Compressible Proximally and Distally to Insertion Site: Yes  Brisk Blood Return Obtained and Line Draws Easily: Yes  Tip Location: SVC  Line Confirmation: ECG  Lot #:[id number]  : Bard  PICC Line Exp Date: 12/31/2025  Securement: Stat Lock  Post Procedure Checklist: Handoff with RN; Obtain all new IV tubing prior to use; Bed at lowest level and wheels locked; Line discharge information at bedside.  Additional Details: Line was inserted using Modified Seldinger's Technique.     Placed by: Minal Miles RN

## 2024-12-28 NOTE — H&P
HPI  74yoF with pmhx of newly diagnosed small cell lung cancer with liver mets, sjogrens, SLE, T2DM, HTN, Afib who presented to [facility] with history of malaise, dry heave, fatigue.    Patient notes that a few days prior to admission she had felt generally poor. She was not tolerating her food well, no emesis but no appetite. She was very tired, more normal than her usual state and was not as mobile. She did not have a subjective fever at home. Patient is on 4L NC at home but continued to feel progressive SOB. Patient had 1 episode of diarrhea that was small volume and non-bloody.     Upon admission at OSH, Hg was 7.2, no obvious signs of bleeding. Patient had a CT Abd/pelvis with contrast: Small loculated left pleural effusion with adjacent atelectasis and consolidation in left lung base, left pleural drainage catheter in place, multiple liver masses. Repeated Hg at OSH showed hg of 5.7, patient was given 2 units of PRBC and sent to [facility] for further care.    In the ED/Upon arrival to the floor/unit:   Vitals:   /62 (BP Location: Right arm, Patient Position: Sitting)   Pulse 81   Temp 36.8 °C (98.2 °F) (Temporal)   Resp 18   SpO2 98%    - Labs:   CBC: WBC 0.6 Hgb 7.5  plt 19   BMP: Na 133, K 3.4 Cl 92 HCO3 31 BUN 12 Cr 0.62glu 127   LFT: Ca 7.4 tprot 5.3, alb 3.0 alkphos 141 AST11 ALT 13 tbili 1.1 dbili _   Electrolytes: PO4 _ Mg _   Heme: PT 13.2, INR 1.2 aPTT 29  BNP _ lactate _ Troponin _  - Imaging:   No results found.      Past Medical History  No past medical history on file.    Surgical History  No past surgical history on file.    Social History  She has no history on file for tobacco use, alcohol use, and drug use.    Family History  No family history on file.     Allergies  Keflex [cephalexin], Metformin, Chantix [varenicline], and Levofloxacin    Home Medications  Prior to Admission medications    Not on File       Medications  Scheduled medications  acyclovir, 800 mg, oral, q24h  KIMMY  furosemide, 40 mg, oral, BID  insulin glargine, 15 Units, subcutaneous, Nightly  insulin lispro, 0-5 Units, subcutaneous, TID AC  labetalol, 200 mg, oral, BID  losartan, 25 mg, oral, Daily  melatonin, 5 mg, oral, Daily  pantoprazole, 40 mg, intravenous, BID  polyethylene glycol, 17 g, oral, Daily        Continuous medications       PRN medications  PRN medications: dextrose, dextrose, glucagon, glucagon     Constitutional: in NAD, on 4L NC  HEENT: sclerae anicteric, EOM grossly intact  CV: RRR, no murmurs noted  Pulm: CTAB, no increased WOB, pleurx in place on left side  GI: abd soft, NT, ND  Skin: warm and dry  Ext: bilateral LE without pitting edema   Neuro: alert and conversant  Psych: affect appropriate    Assessment & Plan  74yoF with pmhx of newly diagnosed small cell lung cancer with liver mets, sjogrens, SLE, T2DM, HTN, Afib who presented to [facility] with history of malaise, dry heave, fatigue. Patient known to have pancytopenia, had cytology from bronch done at , transferred here for further care.    Newly diagnosed lung cancer primary with liver mets  ::Cytology report at  shows positive small cell cancer  ::Patient notes she is currently on treatment and has completed 2 out of 4 courses of chemotherapy, unable to recall name, start date or duration of treatment, will attempt to gain OSH records  ::CT scan with findings or liver mets, stable in size  ::Pleurx in place  ::On 4L NC  -Left tunneled cath placed 11/8/2024    #Pancytopenia  #Anemia  ::Likely secondary to the cancer  ::Hg 5.7 at OSH, was given 2 units PRBC  -Sent Type/Screen, Verab, consented  -blood smear ordered  -CBC ordered  -c/w daily acyclovir 800mg for VXV ppx per documentation    #Reported GIB  ::Possible source of anemia, reported melontic   -IV PPI BID  -NPO    #Paroxysmal Afib  -On labtalol 200mg BID    #HFpEF  ::Last reported EF 55-60%, grade 2 diastolic dysfunction, mild LVH, mild RV enlargement at OSH  -Lasix 40mg  BID  -Losartan 20mg daily    #GERD  -omeprazole 40mg daily    #T2DM  ::No A1c baseline  -30 units glargine at home, halved while inpatient to 15 units  -SSI  -A1c ordered    #YOVANA  -Rt consult for cpap settings    F : PRN  E: PRN  N: NPO    DVT prophylaxis: held in setting of possible bleed    Code Status: Full Code (confirmed on admission)   NOK: Extended Emergency Contact Information  Primary Emergency Contact: Lidia Calix  Home Phone: [phone]  Relation: Sibling

## 2024-12-28 NOTE — HOSPITAL COURSE
74 y.o. female w/ a PMH Stage IV small cell lung cancer s/p 2 cycles carbo/etoposide/atezolizumab with liver mets, sjogrens, SLE, T2DM, HTN, HFpEF, Afib who presented to [facility] with history of malaise, dry heave, fatigue. Follows with Dr. Carlita Danielson outpatient but interested in transferring her care to  as he is leaving his current practice in the spring. She has tunneled L pleural catheter placed due to L pleural effusion, likely malignant, and she drains this every other day at her SNF.    She was diagnosed a few months ago and started therapy in 11/2024. Was at [facility], had SOB and malaise and was sent to [facility]. She had Hgb 5.7 and was given 2 units RBC with improvement. They did LORIN which apparently had blood. She was transferred because they tried to consult oncology but realized they didn't have onc consult over the holidays.     On arrival she was in severe pain, endorsed nausea, low appetite, SOB, general pain and frustration with her care thus far. Her Hgb was 7.5 and platelets 19. She lost IV access and Midline was obtained.     Supportive oncology was consulted and we adjusted her regimen to: scheduled dilaudid 2 mg q4, with PRN 0.4 IV for breakthrough. Added methocarbamol for spasms, lidocaine patches, zofran and olanzapine.     12/30 To assess for other etiologies of fatigue TSH, free T4, ACTH, Cortisol were obtained but were unremarkable.  Patient began reporting diarrhea at this time so C.Diff stool sample was obtained. 12/31 Patient had continued diarrhea, C. Diff PCR, and Toxin returned positive so PO Vancomycin 125 mg 4 times daily was started. 1/1 Patient had increased WOB so IV lasix was resumed. Patient had significant improvement, and was able to return to baseline O2 requirement.. 1/2 patient had 200 ml drained from her chest tube and had significant improvement in bowel movement reporting no loose stools. 1/3 Morning CBC revealed Hgb 6.6, GI was consulted and deemed that  there was no need for endoscopic evaluation at this time. She is currently at her baseline O2 requirement and has no acute complaints at this time.       Her pancytopenia is secondary to her chemotherapy. Her fatigue is multifactorial (anemia, malnutrition, chemotherapy, deconditioning).    TO DO:   - Continue to monitor for improvement in patient's diarrhea   - CBC transfuse if Hgb < 7 or plt < 10 or < 20 with active bleeding  - assess volume status and breathing  - if SOB drain L pleural effusion (nursing will) or consider more lasix   - nutrition and supportive onc are following

## 2024-12-28 NOTE — SIGNIFICANT EVENT
Update A/P Note:    Internal Medicine Progress Note          Assessment and Plan    Alejandra Smith is a 74 y.o. female w/ a PMH Stage IV small cell lung cancer s/p 2 cycles carbo/etoposide/atezolizumab with liver mets, sjogrens, SLE, T2DM, HTN, HFpEF, Afib who presented to [facility] with history of malaise, dry heave, fatigue. She was found to be pancytopenic requiring transfusion. She has tunneled L pleural catheter placed due to L pleural effusion, likely malignant, and she drains this every other day at her SNF. She was transferred to  for oncology care.    Updates 12/28/24  - lost PIV access due to infiltration, multiple attempts unsuccessful -> Midline order placed  - once midline placed will need CBC -> transfuse for Hgb < 7 and platelets < 10 or < 20 with active bleeding  - worsening SOB, gave lasix, will drain her L pleural catheter and see if that improves SOB. Goal net negative 1L today, can re-dose lasix if needed    #Stage IV SCLC w/ mets to liver   ::Cytology report at  shows positive small cell cancer  :: s/p 2 cycles carbo/etoposide/atezolizumab   :: CT scan showed decrease in size of liver mets suggesting good response to therapy  ::On 4-6L NC  :: Left tunneled cath placed 11/8/2024, drains L pleural effusion every other day at SNF  - dietician consult to optimize nutrition  - avoid steroids given active immunotherapy      #Pancytopenia  #Anemia  ::Likely secondary to her chemotherapy -> appears last cycle was 12/18, making now her genevieve   ::Hg 5.7 at OSH, was given 2 units PRBC -> 7.5 on admission  :: reported 1x bloody stool after LORIN at OSH, low concern for GI bleed, no indication for colonoscopy, no further bloody stools. Also want to avoid colonoscopy and LORIN with severe neutropenia  -Sent Type/Screen, Verab, consented  -blood smear ordered  -c/w daily acyclovir 800mg for VXV ppx per documentation  - transfuse for Hgb < 7 and platelets < 10 or < 20 with active bleeding  - pending  Midline placement for IV access     #Paroxysmal Afib  -On labtalol 200mg BID  - patient refusing telemetry, will monitor on PE daily for regular vs irregular HR and encourage tele monitoring      #HFpEF  ::Last reported EF 55-60%, grade 2 diastolic dysfunction, mild LVH, mild RV enlargement at OSH  -Lasix 40mg daily  -Losartan 20mg daily     #GERD  -omeprazole 40mg daily     #T2DM  ::No A1c baseline  -30 units glargine at home, halved while inpatient to 15 units  -SSI  -A1c ordered     #YOVANA  -Rt consult for cpap settings       Medical Check List   FEN  -Fluids: Will replete PRN   -Electrolytes: Will replete PRN, with goals of Mg >2, K>4  -Nutrition: regular- ensure supplements chocolate flavored  Prophylaxis:  -DVT ppx: holding w/ thrombocytopenia   -GI ppx/Bowel care: PPI  -Abx: None- neutropenic precautions, if febrile cultures + abx   -Pain regimen: dilaudid 2mg q4 hours  Hardware:  -Drains: L tunneled pleural catheter  -Lines: PIV  Social:  -Code: Full Code  -NOK/Surrogate Decision Maker: FifiLidia (Sibling)  [phone] (Home Phone)     Patient assessment and plan staffed with the attending physician on service, Dr. Wang.    Katelyn Santiago MD  Internal Medicine PGY-1    Disclaimer: Documentation completed with the information available at the time of input. The times in the chart may not be reflective of actual patient care times, interventions, or procedures. Documentation occurs after the physical care of the patient.

## 2024-12-28 NOTE — NURSING NOTE
IV Team asked to place PIV as current IV is red and painful. US of pt's left forearm revealed her veins were short and deep. One attempt made to place 20G Accucath but was unsuccessful as pt's vein was too deep. Second attempt to place a 24G to left hand infiltrated. Medical Team present and declined Midline placement.Pt asked to have another RN attempt.

## 2024-12-29 VITALS
DIASTOLIC BLOOD PRESSURE: 70 MMHG | RESPIRATION RATE: 18 BRPM | SYSTOLIC BLOOD PRESSURE: 166 MMHG | OXYGEN SATURATION: 96 % | HEART RATE: 93 BPM | TEMPERATURE: 97.9 F

## 2024-12-29 LAB
ALBUMIN SERPL BCP-MCNC: 2.9 G/DL (ref 3.4–5)
ANION GAP SERPL CALC-SCNC: 16 MMOL/L (ref 10–20)
BASOPHILS # BLD MANUAL: 0 X10*3/UL (ref 0–0.1)
BASOPHILS NFR BLD MANUAL: 0 %
BUN SERPL-MCNC: 10 MG/DL (ref 6–23)
CALCIUM SERPL-MCNC: 7.3 MG/DL (ref 8.6–10.6)
CHLORIDE SERPL-SCNC: 92 MMOL/L (ref 98–107)
CO2 SERPL-SCNC: 31 MMOL/L (ref 21–32)
CREAT SERPL-MCNC: 0.65 MG/DL (ref 0.5–1.05)
EGFRCR SERPLBLD CKD-EPI 2021: >90 ML/MIN/1.73M*2
EOSINOPHIL # BLD MANUAL: 0 X10*3/UL (ref 0–0.4)
EOSINOPHIL NFR BLD MANUAL: 0 %
ERYTHROCYTE [DISTWIDTH] IN BLOOD BY AUTOMATED COUNT: 15.5 % (ref 11.5–14.5)
GLUCOSE SERPL-MCNC: 124 MG/DL (ref 74–99)
HCT VFR BLD AUTO: 21.8 % (ref 36–46)
HGB BLD-MCNC: 7.8 G/DL (ref 12–16)
IMM GRANULOCYTES # BLD AUTO: 0 X10*3/UL (ref 0–0.5)
IMM GRANULOCYTES NFR BLD AUTO: 0 % (ref 0–0.9)
LYMPHOCYTES # BLD MANUAL: 0.56 X10*3/UL (ref 0.8–3)
LYMPHOCYTES NFR BLD MANUAL: 93.8 %
MCH RBC QN AUTO: 26 PG (ref 26–34)
MCHC RBC AUTO-ENTMCNC: 35.8 G/DL (ref 32–36)
MCV RBC AUTO: 73 FL (ref 80–100)
MONOCYTES # BLD MANUAL: 0 X10*3/UL (ref 0.05–0.8)
MONOCYTES NFR BLD MANUAL: 0 %
NEUTS SEG # BLD MANUAL: 0.04 X10*3/UL (ref 1.6–5)
NEUTS SEG NFR BLD MANUAL: 6.2 %
NRBC BLD-RTO: 0 /100 WBCS (ref 0–0)
PHOSPHATE SERPL-MCNC: 2.9 MG/DL (ref 2.5–4.9)
PLATELET # BLD AUTO: 11 X10*3/UL (ref 150–450)
POTASSIUM SERPL-SCNC: 3.1 MMOL/L (ref 3.5–5.3)
RBC # BLD AUTO: 3 X10*6/UL (ref 4–5.2)
RBC MORPH BLD: ABNORMAL
SODIUM SERPL-SCNC: 136 MMOL/L (ref 136–145)
TOTAL CELLS COUNTED BLD: 16
WBC # BLD AUTO: 0.6 X10*3/UL (ref 4.4–11.3)

## 2024-12-29 PROCEDURE — 80069 RENAL FUNCTION PANEL: CPT

## 2024-12-29 PROCEDURE — 2500000001 HC RX 250 WO HCPCS SELF ADMINISTERED DRUGS (ALT 637 FOR MEDICARE OP)

## 2024-12-29 PROCEDURE — 84439 ASSAY OF FREE THYROXINE: CPT

## 2024-12-29 PROCEDURE — 2500000002 HC RX 250 W HCPCS SELF ADMINISTERED DRUGS (ALT 637 FOR MEDICARE OP, ALT 636 FOR OP/ED)

## 2024-12-29 PROCEDURE — 84443 ASSAY THYROID STIM HORMONE: CPT

## 2024-12-29 PROCEDURE — 99233 SBSQ HOSP IP/OBS HIGH 50: CPT | Performed by: STUDENT IN AN ORGANIZED HEALTH CARE EDUCATION/TRAINING PROGRAM

## 2024-12-29 PROCEDURE — 1200000003 HC ONCOLOGY  ROOM WITH TELEMETRY DAILY

## 2024-12-29 PROCEDURE — 85027 COMPLETE CBC AUTOMATED: CPT

## 2024-12-29 PROCEDURE — 85007 BL SMEAR W/DIFF WBC COUNT: CPT

## 2024-12-29 PROCEDURE — 2500000004 HC RX 250 GENERAL PHARMACY W/ HCPCS (ALT 636 FOR OP/ED)

## 2024-12-29 RX ORDER — HYDROMORPHONE HYDROCHLORIDE 2 MG/1
2 TABLET ORAL EVERY 4 HOURS PRN
Status: DISCONTINUED | OUTPATIENT
Start: 2024-12-29 | End: 2025-01-04 | Stop reason: HOSPADM

## 2024-12-29 RX ORDER — LOPERAMIDE HYDROCHLORIDE 2 MG/1
2 CAPSULE ORAL ONCE
Status: COMPLETED | OUTPATIENT
Start: 2024-12-29 | End: 2024-12-29

## 2024-12-29 RX ORDER — LOPERAMIDE HYDROCHLORIDE 2 MG/1
2 CAPSULE ORAL 4 TIMES DAILY PRN
Status: DISCONTINUED | OUTPATIENT
Start: 2024-12-29 | End: 2024-12-31

## 2024-12-29 RX ADMIN — Medication 5 MG: at 18:56

## 2024-12-29 RX ADMIN — LOPERAMIDE HYDROCHLORIDE 2 MG: 2 CAPSULE ORAL at 01:47

## 2024-12-29 RX ADMIN — LOPERAMIDE HYDROCHLORIDE 2 MG: 2 CAPSULE ORAL at 18:55

## 2024-12-29 RX ADMIN — HYDROMORPHONE HYDROCHLORIDE 2 MG: 2 TABLET ORAL at 03:25

## 2024-12-29 RX ADMIN — HYDROMORPHONE HYDROCHLORIDE 2 MG: 2 TABLET ORAL at 13:37

## 2024-12-29 RX ADMIN — AMLODIPINE BESYLATE 10 MG: 10 TABLET ORAL at 08:18

## 2024-12-29 RX ADMIN — OLANZAPINE 2.5 MG: 5 TABLET, FILM COATED ORAL at 20:55

## 2024-12-29 RX ADMIN — HYDROXYZINE HYDROCHLORIDE 25 MG: 25 TABLET, FILM COATED ORAL at 18:55

## 2024-12-29 RX ADMIN — HYDROXYZINE HYDROCHLORIDE 25 MG: 25 TABLET, FILM COATED ORAL at 12:06

## 2024-12-29 RX ADMIN — HYDROMORPHONE HYDROCHLORIDE 2 MG: 2 TABLET ORAL at 18:55

## 2024-12-29 RX ADMIN — PANTOPRAZOLE SODIUM 40 MG: 40 TABLET, DELAYED RELEASE ORAL at 08:18

## 2024-12-29 RX ADMIN — LOPERAMIDE HYDROCHLORIDE 2 MG: 2 CAPSULE ORAL at 09:07

## 2024-12-29 RX ADMIN — ONDANSETRON 4 MG: 4 TABLET, ORALLY DISINTEGRATING ORAL at 18:55

## 2024-12-29 RX ADMIN — HYDROXYZINE HYDROCHLORIDE 25 MG: 25 TABLET, FILM COATED ORAL at 03:25

## 2024-12-29 RX ADMIN — LOSARTAN POTASSIUM 25 MG: 25 TABLET, FILM COATED ORAL at 08:18

## 2024-12-29 RX ADMIN — LOPERAMIDE HYDROCHLORIDE 2 MG: 2 CAPSULE ORAL at 13:37

## 2024-12-29 RX ADMIN — ACYCLOVIR 800 MG: 400 TABLET ORAL at 08:18

## 2024-12-29 RX ADMIN — LABETALOL HYDROCHLORIDE 200 MG: 200 TABLET, FILM COATED ORAL at 20:55

## 2024-12-29 RX ADMIN — HYDROMORPHONE HYDROCHLORIDE 2 MG: 2 TABLET ORAL at 08:18

## 2024-12-29 RX ADMIN — FUROSEMIDE 40 MG: 40 TABLET ORAL at 08:18

## 2024-12-29 RX ADMIN — LABETALOL HYDROCHLORIDE 200 MG: 200 TABLET, FILM COATED ORAL at 08:18

## 2024-12-29 SDOH — ECONOMIC STABILITY: HOUSING INSECURITY: IN THE LAST 12 MONTHS, WAS THERE A TIME WHEN YOU WERE NOT ABLE TO PAY THE MORTGAGE OR RENT ON TIME?: NO

## 2024-12-29 SDOH — ECONOMIC STABILITY: INCOME INSECURITY: IN THE PAST 12 MONTHS HAS THE ELECTRIC, GAS, OIL, OR WATER COMPANY THREATENED TO SHUT OFF SERVICES IN YOUR HOME?: NO

## 2024-12-29 SDOH — SOCIAL STABILITY: SOCIAL INSECURITY: WITHIN THE LAST YEAR, HAVE YOU BEEN HUMILIATED OR EMOTIONALLY ABUSED IN OTHER WAYS BY YOUR PARTNER OR EX-PARTNER?: NO

## 2024-12-29 SDOH — ECONOMIC STABILITY: TRANSPORTATION INSECURITY: IN THE PAST 12 MONTHS, HAS LACK OF TRANSPORTATION KEPT YOU FROM MEDICAL APPOINTMENTS OR FROM GETTING MEDICATIONS?: NO

## 2024-12-29 SDOH — ECONOMIC STABILITY: HOUSING INSECURITY: IN THE PAST 12 MONTHS, HOW MANY TIMES HAVE YOU MOVED WHERE YOU WERE LIVING?: 1

## 2024-12-29 SDOH — SOCIAL STABILITY: SOCIAL INSECURITY: WITHIN THE LAST YEAR, HAVE YOU BEEN AFRAID OF YOUR PARTNER OR EX-PARTNER?: NO

## 2024-12-29 SDOH — ECONOMIC STABILITY: HOUSING INSECURITY: AT ANY TIME IN THE PAST 12 MONTHS, WERE YOU HOMELESS OR LIVING IN A SHELTER (INCLUDING NOW)?: NO

## 2024-12-29 ASSESSMENT — COGNITIVE AND FUNCTIONAL STATUS - GENERAL
DRESSING REGULAR UPPER BODY CLOTHING: A LITTLE
DAILY ACTIVITIY SCORE: 18
DRESSING REGULAR LOWER BODY CLOTHING: A LITTLE
HELP NEEDED FOR BATHING: A LOT
TOILETING: A LOT
WALKING IN HOSPITAL ROOM: A LOT
STANDING UP FROM CHAIR USING ARMS: A LOT
MOBILITY SCORE: 13
MOVING FROM LYING ON BACK TO SITTING ON SIDE OF FLAT BED WITH BEDRAILS: A LITTLE
CLIMB 3 TO 5 STEPS WITH RAILING: TOTAL
TURNING FROM BACK TO SIDE WHILE IN FLAT BAD: A LITTLE
MOVING TO AND FROM BED TO CHAIR: A LOT

## 2024-12-29 ASSESSMENT — ACTIVITIES OF DAILY LIVING (ADL)
LACK_OF_TRANSPORTATION: NO
LACK_OF_TRANSPORTATION: NO

## 2024-12-29 ASSESSMENT — PAIN - FUNCTIONAL ASSESSMENT
PAIN_FUNCTIONAL_ASSESSMENT: UNABLE TO SELF-REPORT
PAIN_FUNCTIONAL_ASSESSMENT: 0-10

## 2024-12-29 ASSESSMENT — PAIN SCALES - GENERAL
PAINLEVEL_OUTOF10: 7
PAINLEVEL_OUTOF10: 7
PAINLEVEL_OUTOF10: 0 - NO PAIN
PAINLEVEL_OUTOF10: 7

## 2024-12-29 NOTE — PROGRESS NOTES
12/29/24 1830   Discharge Planning   Living Arrangements Alone   Support Systems Family members   Assistance Needed yes   Type of Residence Private residence   Number of Stairs to Enter Residence 0   Number of Stairs Within Residence 0   Do you have animals or pets at home? No   Who is requesting discharge planning? Other (Comment)  (TCC assessment)   Home or Post Acute Services In home services   Type of Home Care Services Home OT;Home PT;Home nursing visits;Home health aide   Expected Discharge Disposition Home Health   Does the patient need discharge transport arranged? No   Financial Resource Strain   How hard is it for you to pay for the very basics like food, housing, medical care, and heating? Not very   Housing Stability   In the last 12 months, was there a time when you were not able to pay the mortgage or rent on time? N   In the past 12 months, how many times have you moved where you were living? 1   At any time in the past 12 months, were you homeless or living in a shelter (including now)? N   Transportation Needs   In the past 12 months, has lack of transportation kept you from medical appointments or from getting medications? no   In the past 12 months, has lack of transportation kept you from meetings, work, or from getting things needed for daily living? No   Intensity of Service   Intensity of Service 0-30 min     Transitional Care Coordinator Note: Spoke with patient to discuss discharge planning s/p admission. Patient lives home alone. Support person( Lidia- sister as listed) Requires assistance in all ADL's. Requires assist devices for ambulation(walker/WC). Patient active with  home care ( [facility] and [facility]) Demographics and contact information confirmed.  Will continue to monitor patient for all home going needs.  Tabby Jarquin RN TCC via [employer].    Falls- yes, June 2024   Equipment- walker/wc  HC- [facility] 5days a week- [employer] [facility]/ PT/OT [facility]   DM- yes, dexom but patient would  like a glucometer prior to dc as she will need to dc her dexcom in order to use her arm for BP readings   Dialysis- none    O2/Cpap -yes CPAP at bedside   SW- none   PCP- Chauncey Churchill-    Pharm-[facility]- [place]   Transport at dc- per patient sister will coordinate

## 2024-12-29 NOTE — PROGRESS NOTES
Daily Progress Note    Alejandra Smith is a 74 y.o. female on day 1 of admission presenting with Anemia.    Subjective   NAEON. Pt complaining of diarrhea, pain near tailbone this AM. States that breathing is improved. No fever, chills, chest pain overnight.       10 point ROS performed and negative unless stated above.          Objective        Daily Weight  No data found for Wt      Last Recorded Vitals  Heart Rate:  [64-97]   Temp:  [36.2 °C (97.2 °F)-36.8 °C (98.2 °F)]   Resp:  [18]   BP: (148-190)/(68-90)   SpO2:  [92 %-100 %]      Physical Exam  Constitutional: no acute distress  Respiratory: No wheezes, rales, or rhonchi. Normal respiratory effort.  Cardiovascular: RRR, No murmurs, gallops, or rubs  Abdominal: Soft, nontender, nondistended. Bowel sounds present  Neuro: AAOx 4, CN II-XII grossly intact  MSK: No LE edema bilaterally, moving extremities well  Skin: Warm, dry. Well perfused  Psych: Appropriate mood and affect      Intake/Output last 3 Shifts:  I/O last 3 completed shifts:  In: 265 [P.O.:240; IV Piggyback:25]  Out: 1865 [Urine:1725; Emesis/NG output:140]    Medications  Scheduled medications  acyclovir, 800 mg, oral, q24h KIMMY  amLODIPine, 10 mg, oral, Daily  furosemide, 40 mg, oral, Daily  HYDROmorphone, 2 mg, oral, q4h  insulin glargine, 15 Units, subcutaneous, Nightly  insulin lispro, 0-5 Units, subcutaneous, TID AC  labetalol, 200 mg, oral, BID  lidocaine, 5 mL, infiltration, Once  lidocaine, 2 patch, transdermal, Daily  losartan, 25 mg, oral, Daily  melatonin, 5 mg, oral, Daily  OLANZapine, 2.5 mg, oral, Nightly  pantoprazole, 40 mg, oral, Daily before breakfast  polyethylene glycol, 17 g, oral, Daily      Continuous medications     PRN medications  PRN medications: albuterol, dextrose, dextrose, glucagon, glucagon, HYDROmorphone, hydrOXYzine HCL, methocarbamol, ondansetron ODT       Relevant Results    Labs  Results from last 7 days   Lab Units 12/28/24  0151   SODIUM mmol/L 133*  "  POTASSIUM mmol/L 3.4*   CHLORIDE mmol/L 92*   CO2 mmol/L 31   BUN mg/dL 12   CREATININE mg/dL 0.62   CALCIUM mg/dL 7.4*      Results from last 7 days   Lab Units 12/28/24  1605 12/28/24  0151   HEMOGLOBIN g/dL 8.6* 7.5*   WBC AUTO x10*3/uL 0.5* 0.6*   PLATELETS AUTO x10*3/uL 13* 19*   HEMATOCRIT % 23.9* 22.4*     Results from last 7 days   Lab Units 12/28/24  0151   ALK PHOS U/L 141*   BILIRUBIN TOTAL mg/dL 1.1   BILIRUBIN DIRECT mg/dL 0.2   PROTEIN TOTAL g/dL 5.3*   ALT U/L 13   AST U/L 11      Results from last 7 days   Lab Units 12/28/24  0151   PROTIME seconds 13.2*   INR  1.2*   APTT seconds 29     No results found for: \"NONUHFIRE\", \"LACTATE\"    Results from last 7 days   Lab Units 12/28/24  0151   GLUCOSE mg/dL 127*       Imaging  Bedside Midline Imaging    Result Date: 12/28/2024  These images are not reportable by radiology and will not be interpreted by  Radiologists.    XR chest 1 view    Result Date: 12/28/2024  Interpreted By:  Luke Bearden, STUDY: XR CHEST 1 VIEW;  12/28/2024 3:07 pm   INDICATION: Signs/Symptoms:SOB.   COMPARISON: None.   ACCESSION NUMBER(S): [id number]   ORDERING CLINICIAN: ISMAEL SNYDER   FINDINGS: AP radiograph of the chest. Patient's is significantly rotated towards right, limiting evaluation. There is poorly visualized tubular structure projecting over left lung base, which may represent a chest tube.   CARDIOMEDIASTINAL SILHOUETTE: The cardiomediastinal silhouette appears enlarged, exaggerated by patient rotation.   LUNGS: There is at least moderate-sized left pleural effusion and associated atelectasis/consolidation. There is mild right basilar atelectasis suggestion of trace/small right pleural effusion. Low lung volumes with bronchovascular crowding with suggestion of mild pulmonary edema. There is no pneumothorax.   ABDOMEN: No remarkable upper abdominal findings.   BONES: No acute osseous abnormality.       Limited examination because of significant patient rotation within " this limitation: 1. At least moderate-sized left pleural effusion and associated atelectasis/consolidation. Poorly visualized tubular structure projecting over left lung base, which may represent a chest tube. Correlate clinically. 2. Mild right basilar atelectasis suggestion of trace/small right pleural effusion. 3. Suggestion of mild pulmonary edema.   Signed by: Luke Bearden 12/28/2024 3:30 PM Dictation workstation:   [id number]                Assessment/Plan   Assessment & Plan  Anemia      Alejandra Smith is a 74 y.o. female w/ a PMH Stage IV small cell lung cancer s/p 2 cycles carbo/etoposide/atezolizumab with liver mets, sjogrens, SLE, T2DM, HTN, HFpEF, Afib who presented to [facility] with history of malaise, dry heave, fatigue. She was found to be pancytopenic requiring transfusion. She has tunneled L pleural catheter placed due to L pleural effusion, likely malignant, and she drains this every other day at her SNF. She was transferred to  for oncology care.     Updates 12/29/24  - Midline placed 12/28  - Breathing improved after Lasix, draining chest tube yesterday. Goal net negative 1L today, can re-dose lasix if needed     #Stage IV SCLC w/ mets to liver   ::Cytology report at  shows positive small cell cancer  :: s/p 2 cycles carbo/etoposide/atezolizumab   :: CT scan showed decrease in size of liver mets suggesting good response to therapy  ::On 4-6L NC  :: Left tunneled cath placed 11/8/2024, drains L pleural effusion every other day at SNF  - dietician consult to optimize nutrition  - avoid steroids given active immunotherapy      #Pancytopenia  #Anemia  ::Likely secondary to her chemotherapy -> appears last cycle was 12/18, making now her genevieve   ::Hg 5.7 at OSH, was given 2 units PRBC -> 7.5 on admission  :: reported 1x bloody stool after LORIN at OSH, low concern for GI bleed, no indication for colonoscopy, no further bloody stools. Also want to avoid colonoscopy and LORIN with severe  neutropenia  -Sent Type/Screen, Verab, consented  -blood smear ordered  -c/w daily acyclovir 800mg for VXV ppx per documentation  - transfuse for Hgb < 7 and platelets < 10 or < 20 with active bleeding  - Midline placed for IV access     #Paroxysmal Afib  -On labtalol 200mg BID  - patient refusing telemetry, will monitor on PE daily for regular vs irregular HR and encourage tele monitoring      #HFpEF  ::Last reported EF 55-60%, grade 2 diastolic dysfunction, mild LVH, mild RV enlargement at OSH  -Lasix 40mg daily  -Losartan 20mg daily     #GERD  -omeprazole 40mg daily     #T2DM  ::No A1c baseline  -30 units glargine at home, halved while inpatient to 15 units  -SSI  -A1c ordered     #YOVANA  -Rt consult for cpap settings        Medical Check List   FEN  -Fluids: Will replete PRN   -Electrolytes: Will replete PRN, with goals of Mg >2, K>4  -Nutrition: regular- ensure supplements chocolate flavored  Prophylaxis:  -DVT ppx: holding w/ thrombocytopenia   -GI ppx/Bowel care: PPI  -Abx: None- neutropenic precautions, if febrile cultures + abx   -Pain regimen: dilaudid 2mg q4 hours  Hardware:  -Drains: L tunneled pleural catheter  -Lines: PIV  Social:  -Code: Full Code  -NOK/Surrogate Decision Maker: FifiLidia (Sibling)  [phone] (Home Phone)      Patient assessment and plan staffed with the attending physician on service, Dr. Wang.     Neno Garcia MD  Internal Medicine PGY-1

## 2024-12-29 NOTE — CARE PLAN
The patient's goals for the shift include      The clinical goals for the shift include patient will remain HDS    Problem: Pain  Goal: Takes deep breaths with improved pain control throughout the shift  Outcome: Progressing  Goal: Turns in bed with improved pain control throughout the shift  Outcome: Progressing  Goal: Walks with improved pain control throughout the shift  Outcome: Progressing  Goal: Performs ADL's with improved pain control throughout shift  Outcome: Progressing  Goal: Participates in PT with improved pain control throughout the shift  Outcome: Progressing  Goal: Free from opioid side effects throughout the shift  Outcome: Progressing  Goal: Free from acute confusion related to pain meds throughout the shift  Outcome: Progressing     Problem: Pain - Adult  Goal: Verbalizes/displays adequate comfort level or baseline comfort level  Outcome: Progressing     Problem: Safety - Adult  Goal: Free from fall injury  Outcome: Progressing     Problem: Discharge Planning  Goal: Discharge to home or other facility with appropriate resources  Outcome: Progressing     Problem: Chronic Conditions and Co-morbidities  Goal: Patient's chronic conditions and co-morbidity symptoms are monitored and maintained or improved  Outcome: Progressing     Problem: Respiratory  Goal: Minimize anxiety/maximize coping throughout shift  Outcome: Progressing  Goal: Minimal/no exertional discomfort or dyspnea this shift  Outcome: Progressing  Goal: No signs of respiratory distress (eg. Use of accessory muscles. Peds grunting)  Outcome: Progressing  Goal: Patent airway maintained this shift  Outcome: Progressing  Goal: Verbalize decreased shortness of breath this shift  Outcome: Progressing     Problem: Skin  Goal: Decreased wound size/increased tissue granulation at next dressing change  Outcome: Progressing  Flowsheets (Taken 12/29/2024 6485)  Decreased wound size/increased tissue granulation at next dressing change: Promote sleep  for wound healing  Goal: Participates in plan/prevention/treatment measures  Outcome: Progressing  Flowsheets (Taken 12/29/2024 0755)  Participates in plan/prevention/treatment measures: Elevate heels  Goal: Prevent/manage excess moisture  Outcome: Progressing  Flowsheets (Taken 12/29/2024 0755)  Prevent/manage excess moisture: Moisturize dry skin  Goal: Prevent/minimize sheer/friction injuries  Outcome: Progressing  Flowsheets (Taken 12/29/2024 0755)  Prevent/minimize sheer/friction injuries: HOB 30 degrees or less  Goal: Promote/optimize nutrition  Outcome: Progressing  Flowsheets (Taken 12/29/2024 0755)  Promote/optimize nutrition: Monitor/record intake including meals  Goal: Promote skin healing  Outcome: Progressing  Flowsheets (Taken 12/29/2024 0755)  Promote skin healing: Protective dressings over bony prominences

## 2024-12-30 LAB
ALBUMIN SERPL BCP-MCNC: 3.1 G/DL (ref 3.4–5)
ANION GAP SERPL CALC-SCNC: 16 MMOL/L
BASOPHILS # BLD MANUAL: 0.01 X10*3/UL (ref 0–0.1)
BASOPHILS NFR BLD MANUAL: 1.1 %
BUN SERPL-MCNC: 10 MG/DL (ref 6–23)
C DIF TOX TCDA+TCDB STL QL NAA+PROBE: DETECTED
C DIFF TOX A+B STL QL IA: POSITIVE
CALCIUM SERPL-MCNC: 7.4 MG/DL (ref 8.6–10.6)
CHLORIDE SERPL-SCNC: 89 MMOL/L (ref 98–107)
CO2 SERPL-SCNC: 30 MMOL/L (ref 21–32)
CREAT SERPL-MCNC: 0.81 MG/DL (ref 0.5–1.05)
EGFRCR SERPLBLD CKD-EPI 2021: 76 ML/MIN/1.73M*2
EOSINOPHIL # BLD MANUAL: 0.02 X10*3/UL (ref 0–0.4)
EOSINOPHIL NFR BLD MANUAL: 3.2 %
ERYTHROCYTE [DISTWIDTH] IN BLOOD BY AUTOMATED COUNT: 15.5 % (ref 11.5–14.5)
GLUCOSE SERPL-MCNC: 126 MG/DL (ref 74–99)
HCT VFR BLD AUTO: 22.4 % (ref 36–46)
HGB BLD-MCNC: 7.9 G/DL (ref 12–16)
IMM GRANULOCYTES # BLD AUTO: 0.01 X10*3/UL (ref 0–0.5)
IMM GRANULOCYTES NFR BLD AUTO: 1.5 % (ref 0–0.9)
LYMPHOCYTES # BLD MANUAL: 0.45 X10*3/UL (ref 0.8–3)
LYMPHOCYTES NFR BLD MANUAL: 64.5 %
MAGNESIUM SERPL-MCNC: 0.58 MG/DL (ref 1.6–2.4)
MCH RBC QN AUTO: 26.2 PG (ref 26–34)
MCHC RBC AUTO-ENTMCNC: 35.3 G/DL (ref 32–36)
MCV RBC AUTO: 74 FL (ref 80–100)
METAMYELOCYTES # BLD MANUAL: 0.01 X10*3/UL
METAMYELOCYTES NFR BLD MANUAL: 1.1 %
MONOCYTES # BLD MANUAL: 0.05 X10*3/UL (ref 0.05–0.8)
MONOCYTES NFR BLD MANUAL: 7.5 %
NEUTROPHILS # BLD MANUAL: 0.12 X10*3/UL (ref 1.6–5.5)
NEUTS BAND # BLD MANUAL: 0.03 X10*3/UL (ref 0–0.5)
NEUTS BAND NFR BLD MANUAL: 4.3 %
NEUTS SEG # BLD MANUAL: 0.09 X10*3/UL (ref 1.6–5)
NEUTS SEG NFR BLD MANUAL: 12.9 %
NRBC BLD-RTO: 0 /100 WBCS (ref 0–0)
PATH REVIEW-CBC DIFFERENTIAL: NORMAL
PHOSPHATE SERPL-MCNC: 2.5 MG/DL (ref 2.5–4.9)
PLATELET # BLD AUTO: 22 X10*3/UL (ref 150–450)
PLATELET CLUMP BLD QL SMEAR: PRESENT
POTASSIUM SERPL-SCNC: 2.8 MMOL/L (ref 3.5–5.3)
RBC # BLD AUTO: 3.02 X10*6/UL (ref 4–5.2)
RBC MORPH BLD: ABNORMAL
SODIUM SERPL-SCNC: 132 MMOL/L (ref 136–145)
T4 FREE SERPL-MCNC: 1.56 NG/DL (ref 0.78–1.48)
TOTAL CELLS COUNTED BLD: 93
TSH SERPL-ACNC: 0.97 MIU/L (ref 0.44–3.98)
VARIANT LYMPHS # BLD MANUAL: 0.04 X10*3/UL (ref 0–0.3)
VARIANT LYMPHS NFR BLD: 5.4 %
WBC # BLD AUTO: 0.7 X10*3/UL (ref 4.4–11.3)

## 2024-12-30 PROCEDURE — 2500000002 HC RX 250 W HCPCS SELF ADMINISTERED DRUGS (ALT 637 FOR MEDICARE OP, ALT 636 FOR OP/ED)

## 2024-12-30 PROCEDURE — 2500000001 HC RX 250 WO HCPCS SELF ADMINISTERED DRUGS (ALT 637 FOR MEDICARE OP)

## 2024-12-30 PROCEDURE — 87324 CLOSTRIDIUM AG IA: CPT

## 2024-12-30 PROCEDURE — 85027 COMPLETE CBC AUTOMATED: CPT

## 2024-12-30 PROCEDURE — 2500000004 HC RX 250 GENERAL PHARMACY W/ HCPCS (ALT 636 FOR OP/ED)

## 2024-12-30 PROCEDURE — 1200000003 HC ONCOLOGY  ROOM WITH TELEMETRY DAILY

## 2024-12-30 PROCEDURE — 85007 BL SMEAR W/DIFF WBC COUNT: CPT

## 2024-12-30 PROCEDURE — 2500000005 HC RX 250 GENERAL PHARMACY W/O HCPCS

## 2024-12-30 PROCEDURE — 84100 ASSAY OF PHOSPHORUS: CPT

## 2024-12-30 PROCEDURE — 87493 C DIFF AMPLIFIED PROBE: CPT

## 2024-12-30 PROCEDURE — 82024 ASSAY OF ACTH: CPT

## 2024-12-30 PROCEDURE — 99233 SBSQ HOSP IP/OBS HIGH 50: CPT | Performed by: INTERNAL MEDICINE

## 2024-12-30 PROCEDURE — 83735 ASSAY OF MAGNESIUM: CPT

## 2024-12-30 RX ORDER — POTASSIUM CHLORIDE 20 MEQ/1
40 TABLET, EXTENDED RELEASE ORAL 2 TIMES DAILY
Status: DISCONTINUED | OUTPATIENT
Start: 2024-12-30 | End: 2025-01-02

## 2024-12-30 RX ORDER — MAGNESIUM SULFATE HEPTAHYDRATE 40 MG/ML
4 INJECTION, SOLUTION INTRAVENOUS ONCE
Status: COMPLETED | OUTPATIENT
Start: 2024-12-30 | End: 2024-12-30

## 2024-12-30 RX ORDER — POTASSIUM CHLORIDE 14.9 MG/ML
20 INJECTION INTRAVENOUS ONCE
Status: COMPLETED | OUTPATIENT
Start: 2024-12-30 | End: 2024-12-30

## 2024-12-30 RX ADMIN — OLANZAPINE 2.5 MG: 5 TABLET, FILM COATED ORAL at 21:03

## 2024-12-30 RX ADMIN — ACYCLOVIR 800 MG: 400 TABLET ORAL at 08:45

## 2024-12-30 RX ADMIN — Medication 5 MG: at 21:01

## 2024-12-30 RX ADMIN — HYDROXYZINE HYDROCHLORIDE 25 MG: 25 TABLET, FILM COATED ORAL at 12:42

## 2024-12-30 RX ADMIN — ONDANSETRON 4 MG: 4 TABLET, ORALLY DISINTEGRATING ORAL at 12:32

## 2024-12-30 RX ADMIN — POTASSIUM CHLORIDE 40 MEQ: 1500 TABLET, EXTENDED RELEASE ORAL at 21:02

## 2024-12-30 RX ADMIN — LIDOCAINE 2 PATCH: 4 PATCH TOPICAL at 13:21

## 2024-12-30 RX ADMIN — POTASSIUM CHLORIDE 20 MEQ: 14.9 INJECTION, SOLUTION INTRAVENOUS at 15:11

## 2024-12-30 RX ADMIN — PANTOPRAZOLE SODIUM 40 MG: 40 TABLET, DELAYED RELEASE ORAL at 08:45

## 2024-12-30 RX ADMIN — HYDROMORPHONE HYDROCHLORIDE 2 MG: 2 TABLET ORAL at 21:01

## 2024-12-30 RX ADMIN — LOSARTAN POTASSIUM 25 MG: 25 TABLET, FILM COATED ORAL at 08:45

## 2024-12-30 RX ADMIN — LABETALOL HYDROCHLORIDE 200 MG: 200 TABLET, FILM COATED ORAL at 08:45

## 2024-12-30 RX ADMIN — FUROSEMIDE 40 MG: 40 TABLET ORAL at 08:45

## 2024-12-30 RX ADMIN — MAGNESIUM SULFATE 4 G: 4 INJECTION INTRAVENOUS at 15:11

## 2024-12-30 RX ADMIN — AMLODIPINE BESYLATE 10 MG: 10 TABLET ORAL at 08:45

## 2024-12-30 RX ADMIN — HYDROXYZINE HYDROCHLORIDE 25 MG: 25 TABLET, FILM COATED ORAL at 04:35

## 2024-12-30 RX ADMIN — LABETALOL HYDROCHLORIDE 200 MG: 200 TABLET, FILM COATED ORAL at 21:01

## 2024-12-30 ASSESSMENT — COGNITIVE AND FUNCTIONAL STATUS - GENERAL
WALKING IN HOSPITAL ROOM: A LOT
TOILETING: A LOT
CLIMB 3 TO 5 STEPS WITH RAILING: TOTAL
DRESSING REGULAR LOWER BODY CLOTHING: A LITTLE
STANDING UP FROM CHAIR USING ARMS: A LOT
HELP NEEDED FOR BATHING: A LOT
MOBILITY SCORE: 14
TURNING FROM BACK TO SIDE WHILE IN FLAT BAD: A LITTLE
MOVING TO AND FROM BED TO CHAIR: A LOT
DRESSING REGULAR UPPER BODY CLOTHING: A LITTLE
DAILY ACTIVITIY SCORE: 18

## 2024-12-30 ASSESSMENT — PAIN SCALES - GENERAL
PAINLEVEL_OUTOF10: 0 - NO PAIN
PAINLEVEL_OUTOF10: 0 - NO PAIN
PAINLEVEL_OUTOF10: 7

## 2024-12-30 ASSESSMENT — PAIN - FUNCTIONAL ASSESSMENT: PAIN_FUNCTIONAL_ASSESSMENT: 0-10

## 2024-12-30 NOTE — PROGRESS NOTES
"  Daily Progress Note    Alejandra Smith is a 74 y.o. female on day 2 of admission presenting with Anemia.    Subjective   NAEON.     Patient reports that her breathing has remained at baseline, but reports that she has had diarrhea for the past 5 days. She has had one episode per day. Patient denies fevers chills, and chest pain.     10 point ROS performed and negative unless stated above.          Objective   Weight: 102 kg (225 lb) (12/29/24 1247)    Daily Weight  12/29/24 : 102 kg (225 lb)      Last Recorded Vitals  Heart Rate:  [70-93]   Temp:  [36.4 °C (97.5 °F)-36.6 °C (97.9 °F)]   Resp:  [18]   BP: (136-168)/(63-70)   Height:  [170.2 cm (5' 7\")]   Weight:  [102 kg (225 lb)]   SpO2:  [94 %-98 %]      Physical Exam  Constitutional: no acute distress  Respiratory: No wheezes, rales, or rhonchi. Normal respiratory effort.  Cardiovascular: RRR, No murmurs, gallops, or rubs  Abdominal: Soft, nontender, nondistended. Bowel sounds present  Neuro: AAOx 4, CN II-XII grossly intact  MSK: No LE edema bilaterally, moving extremities well  Skin: Warm, dry. Well perfused  Psych: Appropriate mood and affect      Intake/Output last 3 Shifts:  I/O last 3 completed shifts:  In: 0 (0 mL/kg)   Out: 1590 (15.6 mL/kg) [Urine:1590 (0.4 mL/kg/hr)]  Weight: 102.1 kg     Medications  Scheduled medications  acyclovir, 800 mg, oral, q24h KIMMY  amLODIPine, 10 mg, oral, Daily  furosemide, 40 mg, oral, Daily  insulin glargine, 15 Units, subcutaneous, Nightly  insulin lispro, 0-5 Units, subcutaneous, TID AC  labetalol, 200 mg, oral, BID  lidocaine, 5 mL, infiltration, Once  lidocaine, 2 patch, transdermal, Daily  losartan, 25 mg, oral, Daily  melatonin, 5 mg, oral, Daily  OLANZapine, 2.5 mg, oral, Nightly  pantoprazole, 40 mg, oral, Daily before breakfast      Continuous medications     PRN medications  PRN medications: albuterol, dextrose, dextrose, glucagon, glucagon, HYDROmorphone, HYDROmorphone, hydrOXYzine HCL, loperamide, " "methocarbamol, ondansetron ODT       Relevant Results    Labs  Results from last 7 days   Lab Units 12/29/24  0639 12/28/24  0151   SODIUM mmol/L 136 133*   POTASSIUM mmol/L 3.1* 3.4*   CHLORIDE mmol/L 92* 92*   CO2 mmol/L 31 31   BUN mg/dL 10 12   CREATININE mg/dL 0.65 0.62   CALCIUM mg/dL 7.3* 7.4*      Results from last 7 days   Lab Units 12/29/24  0639 12/28/24  1605 12/28/24  0151   HEMOGLOBIN g/dL 7.8* 8.6* 7.5*   WBC AUTO x10*3/uL 0.6* 0.5* 0.6*   PLATELETS AUTO x10*3/uL 11* 13* 19*   HEMATOCRIT % 21.8* 23.9* 22.4*     Results from last 7 days   Lab Units 12/28/24  0151   ALK PHOS U/L 141*   BILIRUBIN TOTAL mg/dL 1.1   BILIRUBIN DIRECT mg/dL 0.2   PROTEIN TOTAL g/dL 5.3*   ALT U/L 13   AST U/L 11      Results from last 7 days   Lab Units 12/28/24  0151   PROTIME seconds 13.2*   INR  1.2*   APTT seconds 29     No results found for: \"NONUHFIRE\", \"LACTATE\"    Results from last 7 days   Lab Units 12/29/24  0639 12/28/24  0151   GLUCOSE mg/dL 124* 127*       Imaging  Bedside Midline Imaging    Result Date: 12/28/2024  These images are not reportable by radiology and will not be interpreted by  Radiologists.    XR chest 1 view    Result Date: 12/28/2024  Interpreted By:  Luke Bearden, STUDY: XR CHEST 1 VIEW;  12/28/2024 3:07 pm   INDICATION: Signs/Symptoms:SOB.   COMPARISON: None.   ACCESSION NUMBER(S): [id number]   ORDERING CLINICIAN: ISMAEL SNYDER   FINDINGS: AP radiograph of the chest. Patient's is significantly rotated towards right, limiting evaluation. There is poorly visualized tubular structure projecting over left lung base, which may represent a chest tube.   CARDIOMEDIASTINAL SILHOUETTE: The cardiomediastinal silhouette appears enlarged, exaggerated by patient rotation.   LUNGS: There is at least moderate-sized left pleural effusion and associated atelectasis/consolidation. There is mild right basilar atelectasis suggestion of trace/small right pleural effusion. Low lung volumes with bronchovascular " crowding with suggestion of mild pulmonary edema. There is no pneumothorax.   ABDOMEN: No remarkable upper abdominal findings.   BONES: No acute osseous abnormality.       Limited examination because of significant patient rotation within this limitation: 1. At least moderate-sized left pleural effusion and associated atelectasis/consolidation. Poorly visualized tubular structure projecting over left lung base, which may represent a chest tube. Correlate clinically. 2. Mild right basilar atelectasis suggestion of trace/small right pleural effusion. 3. Suggestion of mild pulmonary edema.   Signed by: Luke Bearden 12/28/2024 3:30 PM Dictation workstation:   [id number]                Assessment/Plan   Assessment & Plan  Anemia      Alejandra Smith is a 74 y.o. female w/ a PMH Stage IV small cell lung cancer s/p 2 cycles carbo/etoposide/atezolizumab with liver mets, sjogrens, SLE, T2DM, HTN, HFpEF, Afib who presented to [facility] with history of malaise, dry heave, fatigue. She was found to be pancytopenic requiring transfusion. She has tunneled L pleural catheter placed due to L pleural effusion, likely malignant, and she drains this every other day at her SNF. She was transferred to  for oncology care.     Updates 12/30/24  - Midline will be assessed by IV team   - Placed order to drain chest tube   - Ordered TSH, Free T4, ACTH  and Cortisol given patient continued fatigue.   - Sample taken for C.Diff given continued diarrhea      #Stage IV SCLC w/ mets to liver   ::Cytology report at  shows positive small cell cancer  :: s/p 2 cycles carbo/etoposide/atezolizumab   :: CT scan showed decrease in size of liver mets suggesting good response to therapy  ::On 4-6L NC  :: Left tunneled cath placed 11/8/2024, drains L pleural effusion every other day at SNF  - dietician consulted to optimize nutrition  - avoid steroids given active immunotherapy      #Pancytopenia  #Anemia  ::Likely secondary to her chemotherapy  -> appears last cycle was 12/18, making now her genevieve   ::Hg 5.7 at OSH, was given 2 units PRBC -> 7.5 on admission  :: reported 1x bloody stool after LORIN at OSH, low concern for GI bleed, no indication for colonoscopy, no further bloody stools. Also want to avoid colonoscopy and LORIN with severe neutropenia  -Sent Type/Screen, Verab, consented  -blood smear ordered  -c/w daily acyclovir 800mg for VXV ppx per documentation  - transfuse for Hgb < 7 and platelets < 10 or < 20 with active bleeding  - Midline placed for IV access     #Paroxysmal Afib  -On labtalol 200mg BID  - patient refusing telemetry, will monitor on PE daily for regular vs irregular HR and encourage tele monitoring      #HFpEF  ::Last reported EF 55-60%, grade 2 diastolic dysfunction, mild LVH, mild RV enlargement at OSH  -Lasix 40mg daily  -Losartan 20mg daily     #GERD  -omeprazole 40mg daily     #T2DM  ::No A1c baseline  -30 units glargine at home, halved while inpatient to 15 units  -SSI  -A1c ordered     #YOVANA  -Rt consult for cpap settings        Medical Check List   FEN  -Fluids: Will replete PRN   -Electrolytes: Will replete PRN, with goals of Mg >2, K>4  -Nutrition: regular- ensure supplements chocolate flavored  Prophylaxis:  -DVT ppx: holding w/ thrombocytopenia   -GI ppx/Bowel care: PPI  -Abx: None- neutropenic precautions, if febrile cultures + abx   -Pain regimen: dilaudid 2mg q4 hours  Hardware:  -Drains: L tunneled pleural catheter  -Lines: PIV  Social:  -Code: Full Code  -NOK/Surrogate Decision Maker: Lidia Calix (Sibling)  [phone] (Home Phone)      Patient assessment and plan staffed with the attending physician on service, Dr. Wang.     Zohra Acharya MD  Internal Medicine PGY-1

## 2024-12-30 NOTE — CONSULTS
"Nutrition Initial Assessment:   Nutrition Assessment    Reason for Assessment: Admission nursing screening, Provider consult order    Patient is a 74 y.o. female presented to [facility] with hx of malaise, dry heave, fatigue found to be pancytopenic.     PMH: Stage IV small cell lung cancer s/p 2 cycles (carbo/estoposide/atexolixumab) with liver mets, T2DM, HTN, HFpEF    12/28: midline placed  12/29: per oncology team, breathing improved after Lasix and draining chest tube 12/28    No past medical history on file.  No past surgical history on file.    Communicated with team regarding education consult-- team report via secure chat no need for education requesting overall evaluation.     Nutrition History:  Energy Intake: Poor < 50 %  Food and Nutrient History: Met with patient this morning. Pt reports not feeling well with abdominal pain, nausea, and pt reports c diff. Pt reports no PO x 4- 5 days and with diarrhea. Pt reports prior to this 5 day span, pt was trying to eat 3x a day but did not like the food at facility. Pt reports liking ensure plus and is willing to drink 2-3 a day. Pt reports facility does not carry ensure.  Food Allergies/Intolerances:  None  GI Symptoms: Diarrhea, Nausea, and Abdominal pain  Oral Problems: None       Anthropometrics:  Height: 170.2 cm (5' 7\")   Weight: 102 kg (225 lb)   BMI (Calculated): 35.23  IBW/kg (Dietitian Calculated): 61.4 kg  Percent of IBW: 166 %  Adjusted Body Weight (kg): 78 kg    Weight History:   Wt Readings from Last 15 Encounters:   12/29/24 102 kg (225 lb)       Weight Change %:  Weight History / % Weight Change: Pt reports suspect she lost more wt; limited wt history    Nutrition Focused Physical Exam Findings:  defer: Pt in visible pain, discomfort, nausea; will defer at this time.   Edema:  Edema:  (generalized)  Physical Findings:  Skin: Negative    Nutrition Significant Labs:  CBC Trend:   Results from last 7 days   Lab Units 12/29/24  0639 " "12/28/24  1605 12/28/24  0151   WBC AUTO x10*3/uL 0.6* 0.5* 0.6*   RBC AUTO x10*6/uL 3.00* 3.22* 2.83*   HEMOGLOBIN g/dL 7.8* 8.6* 7.5*   HEMATOCRIT % 21.8* 23.9* 22.4*   MCV fL 73* 74* 79*   PLATELETS AUTO x10*3/uL 11* 13* 19*    , BMP Trend:   Results from last 7 days   Lab Units 12/29/24  0639 12/28/24  0151   GLUCOSE mg/dL 124* 127*   CALCIUM mg/dL 7.3* 7.4*   SODIUM mmol/L 136 133*   POTASSIUM mmol/L 3.1* 3.4*   CO2 mmol/L 31 31   CHLORIDE mmol/L 92* 92*   BUN mg/dL 10 12   CREATININE mg/dL 0.65 0.62    , A1C:  Lab Results   Component Value Date    HGBA1C 6.6 (H) 12/28/2024   , BG POCT trend:    , Vit D: No results found for: \"VITD25\" , Vit B12: No results found for: \"PDFDIYAR21\"     Nutrition Specific Medications:  Scheduled medications  acyclovir, 800 mg, oral, q24h KIMMY  amLODIPine, 10 mg, oral, Daily  furosemide, 40 mg, oral, Daily  insulin glargine, 15 Units, subcutaneous, Nightly  insulin lispro, 0-5 Units, subcutaneous, TID AC  labetalol, 200 mg, oral, BID  lidocaine, 5 mL, infiltration, Once  lidocaine, 2 patch, transdermal, Daily  losartan, 25 mg, oral, Daily  melatonin, 5 mg, oral, Daily  OLANZapine, 2.5 mg, oral, Nightly  pantoprazole, 40 mg, oral, Daily before breakfast    I/O:   Last BM Date: 12/28/24; Stool Appearance: Soft, Loose (12/30/24 0856)    Dietary Orders (From admission, onward)       Start     Ordered    12/28/24 1113  Oral nutritional supplements  Until discontinued        Comments: Chocolate flavor   Question Answer Comment   Deliver with All meals    Select supplement: Ensure Plus        12/28/24 1112    12/28/24 0805  May Participate in Room Service  ( ROOM SERVICE MAY PARTICIPATE)  Once        Question:  .  Answer:  Yes    12/28/24 0804    12/28/24 0758  Adult diet Regular  Diet effective now        Question:  Diet type  Answer:  Regular    12/28/24 0757                     Estimated Needs:   Total Energy Estimated Needs (kCal):  (~2000)  Method for Estimating Needs: MSJ: (9788) x " 1.3+  Total Protein Estimated Needs (g):  (~101+)  Method for Estimating Needs: Adjusted BW x  Total Fluid Estimated Needs (mL):  (per team)           Nutrition Diagnosis   Malnutrition Diagnosis  Patient has Malnutrition Diagnosis:  (unable to determine d/t limited hx and information at this time)    Nutrition Diagnosis  Patient has Nutrition Diagnosis: Yes  Diagnosis Status (1): New  Nutrition Diagnosis 1: Inadequate energy intake  Related to (1): diarrhea, no appetite, dislike for food at facility  As Evidenced by (1): pt report <50% of estimated energy needs; Pt report no PO x 4-5 days PTA       Nutrition Interventions/Recommendations         Nutrition Prescription:  Continue a regular diet as tolerated  Continue Ensure Plus TID (350kcal,13g PRO) each   Adjust insulin as needed   Check Vitamin D and supplement as needed  Monitor lytes and replete as needed  Pt would benefit from outpatient RDN referral   Consider an appetite stimulant if medically appropriate   Consider antinausea medications per team discretion         Nutrition Interventions:   Interventions: Meals and snacks, Medical food supplement      Nutrition Monitoring and Evaluation   Food/Nutrient Related History Monitoring  Monitoring and Evaluation Plan: Energy intake  Energy Intake: Estimated energy intake  Criteria: >75% of EEN    Body Composition/Growth/Weight History  Monitoring and Evaluation Plan: Weight    Biochemical Data, Medical Tests and Procedures  Monitoring and Evaluation Plan: Electrolyte/renal panel, Glucose/endocrine profile  Electrolyte and Renal Panel: Sodium, Potassium, Phosphorus, Magnesium  Criteria: WNL  Glucose/Endocrine Profile: Glucose, casual  Criteria: WNL      Time Spent (min): 60 minutes

## 2024-12-30 NOTE — CARE PLAN
Problem: Pain  Goal: Takes deep breaths with improved pain control throughout the shift  Outcome: Progressing  Goal: Turns in bed with improved pain control throughout the shift  Outcome: Progressing  Goal: Walks with improved pain control throughout the shift  Outcome: Progressing  Goal: Performs ADL's with improved pain control throughout shift  Outcome: Progressing  Goal: Participates in PT with improved pain control throughout the shift  Outcome: Progressing  Goal: Free from opioid side effects throughout the shift  Outcome: Progressing  Goal: Free from acute confusion related to pain meds throughout the shift  Outcome: Progressing     Problem: Pain - Adult  Goal: Verbalizes/displays adequate comfort level or baseline comfort level  Outcome: Progressing     Problem: Safety - Adult  Goal: Free from fall injury  Outcome: Progressing     Problem: Discharge Planning  Goal: Discharge to home or other facility with appropriate resources  Outcome: Progressing     Problem: Chronic Conditions and Co-morbidities  Goal: Patient's chronic conditions and co-morbidity symptoms are monitored and maintained or improved  Outcome: Progressing     Problem: Skin  Goal: Decreased wound size/increased tissue granulation at next dressing change  Outcome: Progressing  Goal: Participates in plan/prevention/treatment measures  Outcome: Progressing  Goal: Prevent/manage excess moisture  Outcome: Progressing  Goal: Prevent/minimize sheer/friction injuries  Outcome: Progressing  Goal: Promote/optimize nutrition  Outcome: Progressing  Goal: Promote skin healing  Outcome: Progressing     Problem: Respiratory  Goal: Minimize anxiety/maximize coping throughout shift  Outcome: Progressing  Goal: Minimal/no exertional discomfort or dyspnea this shift  Outcome: Progressing  Goal: No signs of respiratory distress (eg. Use of accessory muscles. Peds grunting)  Outcome: Progressing  Goal: Patent airway maintained this shift  Outcome:  Progressing  Goal: Verbalize decreased shortness of breath this shift  Outcome: Progressing   The patient's goals for the shift include  rest and comfort    The clinical goals for the shift include p0t will remain hds and vss throughout shift.    Over the shift, the patient did not make progress toward the following goals. Barriers to progression include pain. Recommendations to address these barriers include prn meds.

## 2024-12-30 NOTE — PROGRESS NOTES
12/30/24 1300   Discharge Planning   Living Arrangements Alone   Support Systems Family members   Assistance Needed yes   Type of Residence Skilled nursing facility   Who is requesting discharge planning? Provider   Home or Post Acute Services Post acute facilities (Rehab/SNF/etc)   Type of Post Acute Facility Services Skilled nursing   Expected Discharge Disposition SNF     TCC met with patient at bedside. Sister, Lidia, also at bedside. Introduced self as care coordinator and role in discharge planning. Patient reports that prior to hospitalization, she was at [facility] for therapy. She reports she does not want to go back to [facility] at discharge due to feeling like her strength was not improving. TCC took patient and sister SNF list per their request. Patient and sister to decide on top 5 SNF choices and let SW/TCC know by tomorrow. ADOD 2-3 days per medical team. Morelia Bojorquez RN TCC

## 2024-12-31 LAB
ACTH PLAS-MCNC: 5.2 PG/ML (ref 7.2–63.3)
ALBUMIN SERPL BCP-MCNC: 3 G/DL (ref 3.4–5)
ALP SERPL-CCNC: 122 U/L (ref 33–136)
ALT SERPL W P-5'-P-CCNC: 11 U/L (ref 7–45)
ANION GAP SERPL CALC-SCNC: 16 MMOL/L (ref 10–20)
AST SERPL W P-5'-P-CCNC: 8 U/L (ref 9–39)
BASOPHILS # BLD AUTO: 0 X10*3/UL (ref 0–0.1)
BASOPHILS NFR BLD AUTO: 0 %
BILIRUB SERPL-MCNC: 0.6 MG/DL (ref 0–1.2)
BUN SERPL-MCNC: 13 MG/DL (ref 6–23)
CALCIUM SERPL-MCNC: 8.1 MG/DL (ref 8.6–10.6)
CHLORIDE SERPL-SCNC: 88 MMOL/L (ref 98–107)
CO2 SERPL-SCNC: 31 MMOL/L (ref 21–32)
CORTIS AM PEAK SERPL-MSCNC: 23.7 UG/DL (ref 5–20)
CREAT SERPL-MCNC: 1.09 MG/DL (ref 0.5–1.05)
EGFRCR SERPLBLD CKD-EPI 2021: 53 ML/MIN/1.73M*2
EOSINOPHIL # BLD AUTO: 0.02 X10*3/UL (ref 0–0.4)
EOSINOPHIL NFR BLD AUTO: 1.8 %
ERYTHROCYTE [DISTWIDTH] IN BLOOD BY AUTOMATED COUNT: 15.5 % (ref 11.5–14.5)
GLUCOSE SERPL-MCNC: 144 MG/DL (ref 74–99)
HCT VFR BLD AUTO: 22.7 % (ref 36–46)
HGB BLD-MCNC: 7.5 G/DL (ref 12–16)
IMM GRANULOCYTES # BLD AUTO: 0.03 X10*3/UL (ref 0–0.5)
IMM GRANULOCYTES NFR BLD AUTO: 2.6 % (ref 0–0.9)
LYMPHOCYTES # BLD AUTO: 0.53 X10*3/UL (ref 0.8–3)
LYMPHOCYTES NFR BLD AUTO: 46.5 %
MAGNESIUM SERPL-MCNC: 1.33 MG/DL (ref 1.6–2.4)
MCH RBC QN AUTO: 25.7 PG (ref 26–34)
MCHC RBC AUTO-ENTMCNC: 33 G/DL (ref 32–36)
MCV RBC AUTO: 78 FL (ref 80–100)
MONOCYTES # BLD AUTO: 0.2 X10*3/UL (ref 0.05–0.8)
MONOCYTES NFR BLD AUTO: 17.5 %
NEUTROPHILS # BLD AUTO: 0.36 X10*3/UL (ref 1.6–5.5)
NEUTROPHILS NFR BLD AUTO: 31.6 %
NRBC BLD-RTO: 0 /100 WBCS (ref 0–0)
PHOSPHATE SERPL-MCNC: 2.7 MG/DL (ref 2.5–4.9)
PLATELET # BLD AUTO: 48 X10*3/UL (ref 150–450)
POTASSIUM SERPL-SCNC: 3.5 MMOL/L (ref 3.5–5.3)
PROT SERPL-MCNC: 6.2 G/DL (ref 6.4–8.2)
RBC # BLD AUTO: 2.92 X10*6/UL (ref 4–5.2)
RBC MORPH BLD: NORMAL
SODIUM SERPL-SCNC: 131 MMOL/L (ref 136–145)
WBC # BLD AUTO: 1.1 X10*3/UL (ref 4.4–11.3)

## 2024-12-31 PROCEDURE — 1200000003 HC ONCOLOGY  ROOM WITH TELEMETRY DAILY

## 2024-12-31 PROCEDURE — 2500000004 HC RX 250 GENERAL PHARMACY W/ HCPCS (ALT 636 FOR OP/ED)

## 2024-12-31 PROCEDURE — 99233 SBSQ HOSP IP/OBS HIGH 50: CPT

## 2024-12-31 PROCEDURE — 97530 THERAPEUTIC ACTIVITIES: CPT | Mod: GP

## 2024-12-31 PROCEDURE — 82533 TOTAL CORTISOL: CPT

## 2024-12-31 PROCEDURE — 2500000002 HC RX 250 W HCPCS SELF ADMINISTERED DRUGS (ALT 637 FOR MEDICARE OP, ALT 636 FOR OP/ED)

## 2024-12-31 PROCEDURE — 2500000001 HC RX 250 WO HCPCS SELF ADMINISTERED DRUGS (ALT 637 FOR MEDICARE OP)

## 2024-12-31 PROCEDURE — 97162 PT EVAL MOD COMPLEX 30 MIN: CPT | Mod: GP

## 2024-12-31 PROCEDURE — 85025 COMPLETE CBC W/AUTO DIFF WBC: CPT

## 2024-12-31 PROCEDURE — 84100 ASSAY OF PHOSPHORUS: CPT

## 2024-12-31 PROCEDURE — 83735 ASSAY OF MAGNESIUM: CPT

## 2024-12-31 PROCEDURE — 80053 COMPREHEN METABOLIC PANEL: CPT

## 2024-12-31 PROCEDURE — 36416 COLLJ CAPILLARY BLOOD SPEC: CPT

## 2024-12-31 RX ORDER — MAGNESIUM SULFATE HEPTAHYDRATE 40 MG/ML
2 INJECTION, SOLUTION INTRAVENOUS ONCE
Status: COMPLETED | OUTPATIENT
Start: 2024-12-31 | End: 2024-12-31

## 2024-12-31 RX ORDER — POTASSIUM CHLORIDE 20 MEQ/1
20 TABLET, EXTENDED RELEASE ORAL ONCE
Status: COMPLETED | OUTPATIENT
Start: 2024-12-31 | End: 2024-12-31

## 2024-12-31 RX ORDER — VANCOMYCIN HYDROCHLORIDE 125 MG/1
125 CAPSULE ORAL 4 TIMES DAILY
Status: DISCONTINUED | OUTPATIENT
Start: 2024-12-31 | End: 2025-01-04 | Stop reason: HOSPADM

## 2024-12-31 RX ORDER — POTASSIUM CHLORIDE 14.9 MG/ML
20 INJECTION INTRAVENOUS
Status: COMPLETED | OUTPATIENT
Start: 2024-12-31 | End: 2024-12-31

## 2024-12-31 RX ADMIN — LABETALOL HYDROCHLORIDE 200 MG: 200 TABLET, FILM COATED ORAL at 08:31

## 2024-12-31 RX ADMIN — LOSARTAN POTASSIUM 25 MG: 25 TABLET, FILM COATED ORAL at 08:31

## 2024-12-31 RX ADMIN — VANCOMYCIN HYDROCHLORIDE 125 MG: 125 CAPSULE ORAL at 21:25

## 2024-12-31 RX ADMIN — VANCOMYCIN HYDROCHLORIDE 125 MG: 125 CAPSULE ORAL at 13:43

## 2024-12-31 RX ADMIN — MAGNESIUM SULFATE HEPTAHYDRATE 2 G: 40 INJECTION, SOLUTION INTRAVENOUS at 13:59

## 2024-12-31 RX ADMIN — LABETALOL HYDROCHLORIDE 200 MG: 200 TABLET, FILM COATED ORAL at 21:26

## 2024-12-31 RX ADMIN — POTASSIUM CHLORIDE 40 MEQ: 1500 TABLET, EXTENDED RELEASE ORAL at 08:31

## 2024-12-31 RX ADMIN — OLANZAPINE 2.5 MG: 5 TABLET, FILM COATED ORAL at 21:26

## 2024-12-31 RX ADMIN — VANCOMYCIN HYDROCHLORIDE 125 MG: 125 CAPSULE ORAL at 18:15

## 2024-12-31 RX ADMIN — POTASSIUM CHLORIDE 20 MEQ: 14.9 INJECTION, SOLUTION INTRAVENOUS at 13:58

## 2024-12-31 RX ADMIN — Medication 5 MG: at 21:26

## 2024-12-31 RX ADMIN — FUROSEMIDE 40 MG: 40 TABLET ORAL at 08:31

## 2024-12-31 RX ADMIN — HYDROMORPHONE HYDROCHLORIDE 2 MG: 2 TABLET ORAL at 18:17

## 2024-12-31 RX ADMIN — POTASSIUM CHLORIDE 40 MEQ: 1500 TABLET, EXTENDED RELEASE ORAL at 21:26

## 2024-12-31 RX ADMIN — POTASSIUM CHLORIDE 20 MEQ: 14.9 INJECTION, SOLUTION INTRAVENOUS at 15:59

## 2024-12-31 RX ADMIN — ACYCLOVIR 800 MG: 400 TABLET ORAL at 08:31

## 2024-12-31 RX ADMIN — POTASSIUM CHLORIDE 20 MEQ: 1500 TABLET, EXTENDED RELEASE ORAL at 13:59

## 2024-12-31 RX ADMIN — HYDROMORPHONE HYDROCHLORIDE 2 MG: 2 TABLET ORAL at 11:01

## 2024-12-31 RX ADMIN — MAGNESIUM SULFATE HEPTAHYDRATE 2 G: 40 INJECTION, SOLUTION INTRAVENOUS at 16:24

## 2024-12-31 RX ADMIN — INSULIN LISPRO 1 UNITS: 100 INJECTION, SOLUTION INTRAVENOUS; SUBCUTANEOUS at 15:59

## 2024-12-31 RX ADMIN — AMLODIPINE BESYLATE 10 MG: 10 TABLET ORAL at 08:31

## 2024-12-31 RX ADMIN — PANTOPRAZOLE SODIUM 40 MG: 40 TABLET, DELAYED RELEASE ORAL at 08:31

## 2024-12-31 RX ADMIN — VANCOMYCIN HYDROCHLORIDE 125 MG: 125 CAPSULE ORAL at 10:59

## 2024-12-31 ASSESSMENT — PAIN SCALES - GENERAL
PAINLEVEL_OUTOF10: 7
PAINLEVEL_OUTOF10: 8
PAINLEVEL_OUTOF10: 5 - MODERATE PAIN
PAINLEVEL_OUTOF10: 6
PAINLEVEL_OUTOF10: 0 - NO PAIN

## 2024-12-31 ASSESSMENT — COGNITIVE AND FUNCTIONAL STATUS - GENERAL
TOILETING: A LOT
DAILY ACTIVITIY SCORE: 18
TOILETING: A LOT
STANDING UP FROM CHAIR USING ARMS: A LOT
TURNING FROM BACK TO SIDE WHILE IN FLAT BAD: A LITTLE
MOVING TO AND FROM BED TO CHAIR: A LITTLE
WALKING IN HOSPITAL ROOM: A LOT
STANDING UP FROM CHAIR USING ARMS: A LITTLE
CLIMB 3 TO 5 STEPS WITH RAILING: TOTAL
TURNING FROM BACK TO SIDE WHILE IN FLAT BAD: A LITTLE
MOBILITY SCORE: 15
CLIMB 3 TO 5 STEPS WITH RAILING: TOTAL
WALKING IN HOSPITAL ROOM: TOTAL
MOVING FROM LYING ON BACK TO SITTING ON SIDE OF FLAT BED WITH BEDRAILS: A LITTLE
DRESSING REGULAR UPPER BODY CLOTHING: A LITTLE
TURNING FROM BACK TO SIDE WHILE IN FLAT BAD: A LITTLE
HELP NEEDED FOR BATHING: A LOT
DRESSING REGULAR LOWER BODY CLOTHING: A LITTLE
CLIMB 3 TO 5 STEPS WITH RAILING: TOTAL
DRESSING REGULAR UPPER BODY CLOTHING: A LITTLE
MOBILITY SCORE: 11
HELP NEEDED FOR BATHING: A LOT
MOVING TO AND FROM BED TO CHAIR: A LOT
STANDING UP FROM CHAIR USING ARMS: TOTAL
DAILY ACTIVITIY SCORE: 18
MOBILITY SCORE: 16
MOVING TO AND FROM BED TO CHAIR: A LITTLE
DRESSING REGULAR LOWER BODY CLOTHING: A LITTLE
WALKING IN HOSPITAL ROOM: A LOT

## 2024-12-31 ASSESSMENT — PAIN DESCRIPTION - LOCATION
LOCATION: GENERALIZED
LOCATION: GENERALIZED

## 2024-12-31 ASSESSMENT — PAIN - FUNCTIONAL ASSESSMENT
PAIN_FUNCTIONAL_ASSESSMENT: 0-10
PAIN_FUNCTIONAL_ASSESSMENT: 0-10

## 2024-12-31 NOTE — CARE PLAN
The patient's goals for the shift include      The clinical goals for the shift include Pt. will remain HDS and VSS through end of shift 12/30 1900.          Problem: Pain  Goal: Takes deep breaths with improved pain control throughout the shift  Outcome: Progressing  Goal: Turns in bed with improved pain control throughout the shift  Outcome: Progressing  Goal: Walks with improved pain control throughout the shift  Outcome: Progressing  Goal: Performs ADL's with improved pain control throughout shift  Outcome: Progressing  Goal: Participates in PT with improved pain control throughout the shift  Outcome: Progressing  Goal: Free from opioid side effects throughout the shift  Outcome: Progressing  Goal: Free from acute confusion related to pain meds throughout the shift  Outcome: Progressing     Problem: Pain - Adult  Goal: Verbalizes/displays adequate comfort level or baseline comfort level  Outcome: Progressing     Problem: Safety - Adult  Goal: Free from fall injury  Outcome: Progressing     Problem: Discharge Planning  Goal: Discharge to home or other facility with appropriate resources  Outcome: Progressing     Problem: Chronic Conditions and Co-morbidities  Goal: Patient's chronic conditions and co-morbidity symptoms are monitored and maintained or improved  Outcome: Progressing     Problem: Respiratory  Goal: Minimize anxiety/maximize coping throughout shift  Outcome: Progressing  Goal: Minimal/no exertional discomfort or dyspnea this shift  Outcome: Progressing  Goal: No signs of respiratory distress (eg. Use of accessory muscles. Peds grunting)  Outcome: Progressing  Goal: Patent airway maintained this shift  Outcome: Progressing  Goal: Verbalize decreased shortness of breath this shift  Outcome: Progressing     Problem: Skin  Goal: Decreased wound size/increased tissue granulation at next dressing change  Outcome: Progressing  Flowsheets (Taken 12/31/2024 9526)  Decreased wound size/increased tissue  granulation at next dressing change: Protective dressings over bony prominences  Goal: Participates in plan/prevention/treatment measures  Outcome: Progressing  Flowsheets (Taken 12/31/2024 0913)  Participates in plan/prevention/treatment measures: Elevate heels  Goal: Prevent/manage excess moisture  Outcome: Progressing  Flowsheets (Taken 12/31/2024 0913)  Prevent/manage excess moisture: Cleanse incontinence/protect with barrier cream  Goal: Prevent/minimize sheer/friction injuries  Outcome: Progressing  Flowsheets (Taken 12/31/2024 0913)  Prevent/minimize sheer/friction injuries:   HOB 30 degrees or less   Turn/reposition every 2 hours/use positioning/transfer devices  Goal: Promote/optimize nutrition  Outcome: Progressing  Flowsheets (Taken 12/31/2024 0913)  Promote/optimize nutrition: Monitor/record intake including meals  Goal: Promote skin healing  Outcome: Progressing  Flowsheets (Taken 12/31/2024 0913)  Promote skin healing: Turn/reposition every 2 hours/use positioning/transfer devices

## 2024-12-31 NOTE — CARE PLAN
Problem: Pain  Goal: Takes deep breaths with improved pain control throughout the shift  Outcome: Progressing  Goal: Turns in bed with improved pain control throughout the shift  Outcome: Progressing  Goal: Walks with improved pain control throughout the shift  Outcome: Progressing  Goal: Performs ADL's with improved pain control throughout shift  Outcome: Progressing  Goal: Participates in PT with improved pain control throughout the shift  Outcome: Progressing  Goal: Free from opioid side effects throughout the shift  Outcome: Progressing  Goal: Free from acute confusion related to pain meds throughout the shift  Outcome: Progressing     Problem: Pain - Adult  Goal: Verbalizes/displays adequate comfort level or baseline comfort level  Outcome: Progressing     Problem: Safety - Adult  Goal: Free from fall injury  Outcome: Progressing     Problem: Discharge Planning  Goal: Discharge to home or other facility with appropriate resources  Outcome: Progressing     Problem: Chronic Conditions and Co-morbidities  Goal: Patient's chronic conditions and co-morbidity symptoms are monitored and maintained or improved  Outcome: Progressing     Problem: Respiratory  Goal: Minimize anxiety/maximize coping throughout shift  Outcome: Progressing  Goal: Minimal/no exertional discomfort or dyspnea this shift  Outcome: Progressing  Goal: No signs of respiratory distress (eg. Use of accessory muscles. Peds grunting)  Outcome: Progressing  Goal: Patent airway maintained this shift  Outcome: Progressing  Goal: Verbalize decreased shortness of breath this shift  Outcome: Progressing     Problem: Skin  Goal: Decreased wound size/increased tissue granulation at next dressing change  Outcome: Progressing  Goal: Participates in plan/prevention/treatment measures  Outcome: Progressing  Goal: Prevent/manage excess moisture  Outcome: Progressing  Goal: Prevent/minimize sheer/friction injuries  Outcome: Progressing  Goal: Promote/optimize  nutrition  Outcome: Progressing  Goal: Promote skin healing  Outcome: Progressing   The patient's goals for the shift include  rest and comfort    The clinical goals for the shift include Pt. will remain HDS and VSS through end of shift 12/30 1900.    Over the shift, the patient did not make progress toward the following goals. Barriers to progression include pain. Recommendations to address these barriers include prn meds.

## 2024-12-31 NOTE — PROGRESS NOTES
Physical Therapy    Physical Therapy Evaluation & Treatment    Patient Name: Alejandra Smith  MRN: [id number]  Department: [facility]  Room: 60/6011-A  Today's Date: 12/31/2024   Time Calculation  Start Time: 1254  Stop Time: 1323  Time Calculation (min): 29 min    Assessment/Plan   PT Assessment  PT Assessment Results: Decreased strength, Decreased endurance, Impaired balance, Decreased mobility  Rehab Prognosis: Good  Evaluation/Treatment Tolerance: Patient limited by fatigue  Medical Staff Made Aware: Yes  End of Session Communication: Bedside nurse  Assessment Comment: Patient is a 73yo F presenting with weakness and malaise from SNF. Patient reports working with therapy on transfers to commode and wheelchair, limited ambulation. Patient completed bed mobility multiple reps, declined transfers this date to  and fatigue. dc rec mod intensity  End of Session Patient Position: Alarm off, not on at start of session, Bed, 3 rail up (Long sitting, RN aware.)   IP OR SWING BED PT PLAN  Inpatient or Swing Bed: Inpatient  PT Plan  Treatment/Interventions: Bed mobility, Transfer training, Gait training, Stair training, Balance training, Neuromuscular re-education, Strengthening, Endurance training, Range of motion, Therapeutic exercise, Therapeutic activity  PT Plan: Ongoing PT  PT Frequency: 3 times per week  PT Discharge Recommendations: Moderate intensity level of continued care  PT Recommended Transfer Status:  (may require x2 assist for OOB)  PT - OK to Discharge: Yes (indicates PT eval complete and dc rec determined)      Subjective     General Visit Information:  General  Reason for Referral: fatigue, malaise  Past Medical History Relevant to Rehab: Stage IV small cell lung cancer s/p 2 cycles carbo/etoposide/atezolizumab with liver mets, sjogrens, SLE, T2DM, HTN, HFpEF, Afib  Prior to Session Communication: Bedside nurse  Patient Position Received: Bed, 3 rail up  General Comment: pt supine in bed, RN cleared.  pleasant and cooperative  Home Living:  Home Living  Type of Home: Skilled Nursing facility  Prior Level of Function:  Prior Function Per Pt/Caregiver Report  ADL Assistance:  (staff assisted in sponge bathing;)  Prior Function Comments: pt has assist from facility staff. Patient reports working with therapy at times and working on transfers to/from bedside commode and wheelchair. reports minimal ambulation  Precautions:  Precautions  Medical Precautions: Fall precautions  Precautions Comment: contact c diff    Vital Signs (Past 2hrs)        Date/Time Vitals Session Patient Position Pulse Resp SpO2 BP MAP (mmHg)    12/31/24 1300 --  --  100  18  97 %  149/54  77                        Objective   Pain:  Pain Assessment  Pain Assessment: 0-10  0-10 (Numeric) Pain Score: 6  Pain Type: Acute pain  Pain Location: Generalized  Pain Interventions: Repositioned  Cognition:  Cognition  Overall Cognitive Status: Within Functional Limits  Orientation Level: Oriented X4    General Assessments:     Activity Tolerance  Endurance: Tolerates 10 - 20 min exercise with multiple rests    Sensation  Light Touch: No apparent deficits    Static Sitting Balance  Static Sitting-Level of Assistance: Close supervision  Static Sitting-Comment/Number of Minutes: 8 min  Functional Assessments:  Bed Mobility  Bed Mobility: Yes  Bed Mobility 1  Bed Mobility 1: Rolling right, Rolling left  Level of Assistance 1: Contact guard  Bed Mobility Comments 1: 2 reps biliaterally  Bed Mobility 2  Bed Mobility  2: Supine to sitting, Sitting to supine  Level of Assistance 2: Contact guard  Bed Mobility Comments 2: pt self assisted RLE back into bed.  Bed Mobility 3  Bed Mobility 3: Long sit  Level of Assistance 3: Close supervision  Bed Mobility Comments 3: long sit in bed    Transfers  Transfer:  (declined due to BMs)       Extremity/Trunk Assessments:  RLE   RLE :  (3/5)  LLE   LLE :  (3+/5)  Treatments:  Therapeutic Activity  Therapeutic Activity  Performed: Yes  Therapeutic Activity 1: Patient completed rolls in bed bilaterally then sat EOB for 8 min.  Therapeutic Activity 2: lateral scoot along EOB and able to complete one but then pt reported additional BM sitting EOB,. reports feeling too weak to stand this date and continuous BMs. returned to supine in bed, rolled additional time bilterally. then long sitting at end of session  Outcome Measures:  [facility] Basic Mobility  Turning from your back to your side while in a flat bed without using bedrails: A little  Moving from lying on your back to sitting on the side of a flat bed without using bedrails: A little  Moving to and from bed to chair (including a wheelchair): A lot  Standing up from a chair using your arms (e.g. wheelchair or bedside chair): Total  To walk in hospital room: Total  Climbing 3-5 steps with railing: Total  Basic Mobility - Total Score: 11    Encounter Problems       Encounter Problems (Active)       Mobility       STG - Patient will ambulate > 10' in room with LRAD min A (Progressing)       Start:  12/31/24    Expected End:  01/21/25               PT Transfers       STG - Transfer from bed to chair LRAD CGA (Progressing)       Start:  12/31/24    Expected End:  01/21/25            STG - Patient to transfer to and from sit to supine modif indep  (Progressing)       Start:  12/31/24    Expected End:  01/21/25            STG - Patient will roll indep (Progressing)       Start:  12/31/24    Expected End:  01/21/25            STG - Patient will transfer sit to and from stand LRAD min A (Progressing)       Start:  12/31/24    Expected End:  01/21/25                   Education Documentation  Precautions, taught by Jacqueline Sanchez PT at 12/31/2024  2:49 PM.  Learner: Patient  Readiness: Acceptance  Method: Explanation  Response: Verbalizes Understanding  Comment: edu on role of PT, dc plan and safety    Mobility Training, taught by Jacqueline Sanchez PT at 12/31/2024  2:49 PM.  Learner:  Patient  Readiness: Acceptance  Method: Explanation  Response: Verbalizes Understanding  Comment: edu on role of PT, dc plan and safety    Education Comments  No comments found.

## 2024-12-31 NOTE — PROGRESS NOTES
Daily Progress Note    Alejandra Smith is a 74 y.o. female on day 3 of admission presenting with Anemia.    Subjective   NAEON.     Patient reports that her breathing has remained at baseline, she has had 5 instances of diarrhea overnight.  Patient denies fevers chills, and chest pain.     10 point ROS performed and negative unless stated above.          Objective   Weight: 102 kg (225 lb) (12/29/24 1247)    Daily Weight  12/29/24 : 102 kg (225 lb)      Last Recorded Vitals  Heart Rate:  []   Temp:  [36.4 °C (97.5 °F)-36.9 °C (98.4 °F)]   Resp:  [18]   BP: ()/(59-69)   SpO2:  [95 %-98 %]      Physical Exam  Constitutional: no acute distress  Respiratory: No wheezes, rales, or rhonchi. Normal respiratory effort.  Cardiovascular: RRR, No murmurs, gallops, or rubs  Abdominal: Soft, nontender, nondistended. Bowel sounds present  Neuro: AAOx 4, CN II-XII grossly intact  MSK: No LE edema bilaterally, moving extremities well  Skin: Warm, dry. Well perfused  Psych: Appropriate mood and affect      Intake/Output last 3 Shifts:  I/O last 3 completed shifts:  In: 91.3 (0.9 mL/kg) [I.V.:91.3 (0.9 mL/kg)]  Out: 1040 (10.2 mL/kg) [Urine:865 (0.2 mL/kg/hr); Chest Tube:175]  Weight: 102.1 kg     Medications  Scheduled medications  acyclovir, 800 mg, oral, q24h KIMMY  amLODIPine, 10 mg, oral, Daily  furosemide, 40 mg, oral, Daily  insulin glargine, 15 Units, subcutaneous, Nightly  insulin lispro, 0-5 Units, subcutaneous, TID AC  labetalol, 200 mg, oral, BID  lidocaine, 5 mL, infiltration, Once  lidocaine, 2 patch, transdermal, Daily  losartan, 25 mg, oral, Daily  melatonin, 5 mg, oral, Daily  OLANZapine, 2.5 mg, oral, Nightly  pantoprazole, 40 mg, oral, Daily before breakfast  potassium chloride CR, 40 mEq, oral, BID      Continuous medications     PRN medications  PRN medications: albuterol, dextrose, dextrose, glucagon, glucagon, HYDROmorphone, HYDROmorphone, hydrOXYzine HCL, loperamide, methocarbamol, ondansetron  "ODT       Relevant Results    Labs  Results from last 7 days   Lab Units 12/30/24  1233 12/29/24  0639 12/28/24  0151   SODIUM mmol/L 132* 136 133*   POTASSIUM mmol/L 2.8* 3.1* 3.4*   CHLORIDE mmol/L 89* 92* 92*   CO2 mmol/L 30 31 31   BUN mg/dL 10 10 12   CREATININE mg/dL 0.81 0.65 0.62   CALCIUM mg/dL 7.4* 7.3* 7.4*      Results from last 7 days   Lab Units 12/30/24  1233 12/29/24  0639 12/28/24  1605 12/28/24  0151   HEMOGLOBIN g/dL 7.9* 7.8* 8.6* 7.5*   WBC AUTO x10*3/uL 0.7* 0.6* 0.5* 0.6*   PLATELETS AUTO x10*3/uL 22* 11* 13* 19*   HEMATOCRIT % 22.4* 21.8* 23.9* 22.4*     Results from last 7 days   Lab Units 12/28/24  0151   ALK PHOS U/L 141*   BILIRUBIN TOTAL mg/dL 1.1   BILIRUBIN DIRECT mg/dL 0.2   PROTEIN TOTAL g/dL 5.3*   ALT U/L 13   AST U/L 11      Results from last 7 days   Lab Units 12/28/24  0151   PROTIME seconds 13.2*   INR  1.2*   APTT seconds 29     No results found for: \"NONUHFIRE\", \"LACTATE\"    Results from last 7 days   Lab Units 12/30/24  1233 12/29/24  0639 12/28/24  0151   GLUCOSE mg/dL 126* 124* 127*       Imaging  Bedside Midline Imaging    Result Date: 12/28/2024  These images are not reportable by radiology and will not be interpreted by  Radiologists.    XR chest 1 view    Result Date: 12/28/2024  Interpreted By:  Luke Bearden, STUDY: XR CHEST 1 VIEW;  12/28/2024 3:07 pm   INDICATION: Signs/Symptoms:SOB.   COMPARISON: None.   ACCESSION NUMBER(S): [id number]   ORDERING CLINICIAN: ISMAEL SNYDER   FINDINGS: AP radiograph of the chest. Patient's is significantly rotated towards right, limiting evaluation. There is poorly visualized tubular structure projecting over left lung base, which may represent a chest tube.   CARDIOMEDIASTINAL SILHOUETTE: The cardiomediastinal silhouette appears enlarged, exaggerated by patient rotation.   LUNGS: There is at least moderate-sized left pleural effusion and associated atelectasis/consolidation. There is mild right basilar atelectasis suggestion of " trace/small right pleural effusion. Low lung volumes with bronchovascular crowding with suggestion of mild pulmonary edema. There is no pneumothorax.   ABDOMEN: No remarkable upper abdominal findings.   BONES: No acute osseous abnormality.       Limited examination because of significant patient rotation within this limitation: 1. At least moderate-sized left pleural effusion and associated atelectasis/consolidation. Poorly visualized tubular structure projecting over left lung base, which may represent a chest tube. Correlate clinically. 2. Mild right basilar atelectasis suggestion of trace/small right pleural effusion. 3. Suggestion of mild pulmonary edema.   Signed by: Luke Bearden 12/28/2024 3:30 PM Dictation workstation:   [id number]                Assessment/Plan   Assessment & Plan  Anemia      Alejandra Smith is a 74 y.o. female w/ a PMH Stage IV small cell lung cancer s/p 2 cycles carbo/etoposide/atezolizumab with liver mets, sjogrens, SLE, T2DM, HTN, HFpEF, Afib who presented to [facility] with history of malaise, dry heave, fatigue. She was found to be pancytopenic requiring transfusion. She has tunneled L pleural catheter placed due to L pleural effusion, likely malignant, and she drains this every other day at her SNF. She was transferred to  for oncology care. 12/30 patient with positive C.Diff PCR/toxin so oral vancomycin was started.      Updates 12/31/24  - Patient now with working Midline   - Midline will be assessed by IV team   - Placed order to drain chest tube   - TSH 0.97, Free T4 elevated 1.56 , ACTH pending  and Cortisol not collected given patient continued fatigue.   - Sample taken for C.Diff PCR positive, C difficile Toxin  A/B  started PO Vancomycin 4x daily.         #Stage IV SCLC w/ mets to liver   ::Cytology report at  shows positive small cell cancer  :: s/p 2 cycles carbo/etoposide/atezolizumab   :: CT scan showed decrease in size of liver mets suggesting good response  to therapy  ::On 4-6L NC  :: Left tunneled cath placed 11/8/2024, drains L pleural effusion every other day at SNF  - dietician consulted to optimize nutrition  - avoid steroids given active immunotherapy     #Pancytopenia  #Anemia  ::Likely secondary to her chemotherapy -> appears last cycle was 12/18, making now her genevieve   ::Hg 5.7 at OSH, was given 2 units PRBC -> 7.5 on admission  :: reported 1x bloody stool after LORIN at OSH, low concern for GI bleed, no indication for colonoscopy, no further bloody stools. Also want to avoid colonoscopy and LORIN with severe neutropenia  -Sent Type/Screen, Verab, consented  -blood smear ordered  -c/w daily acyclovir 800mg for VXV ppx per documentation  - transfuse for Hgb < 7 and platelets < 10 or < 20 with active bleeding  - Midline placed for IV access       #C.Diff infection   :: C.Diff PCR positive, C difficile Toxin  A/B positive   :: Patient with continued diarrhea   - Started PO Vanc 125 mg 4x daily for 10 days      #Paroxysmal Afib  -On labtalol 200mg BID  - patient refusing telemetry, will monitor on PE daily for regular vs irregular HR and encourage tele monitoring      #HFpEF  ::Last reported EF 55-60%, grade 2 diastolic dysfunction, mild LVH, mild RV enlargement at OSH  -Lasix 40mg daily  -Losartan 20mg daily     #GERD  -omeprazole 40mg daily     #T2DM  ::No A1c baseline  -30 units glargine at home, halved while inpatient to 15 units  -SSI  -A1c ordered     #YOVANA  -Rt consult for cpap settings        Medical Check List   FEN  -Fluids: Will replete PRN   -Electrolytes: Will replete PRN, with goals of Mg >2, K>4  -Nutrition: regular- ensure supplements chocolate flavored  Prophylaxis:  -DVT ppx: holding w/ thrombocytopenia   -GI ppx/Bowel care: PPI  -Abx: None- neutropenic precautions, if febrile cultures + abx   -Pain regimen: dilaudid 2mg q4 hours  Hardware:  -Drains: L tunneled pleural catheter  -Lines: PIV  Social:  -Code: Full Code  -NOK/Surrogate Decision Maker:  Lidia Calix (Sibling)  [phone] (Home Phone)      Patient assessment and plan staffed with the attending physician on service, Dr. Wang.     Zohra Acharya MD  Internal Medicine PGY-1

## 2024-12-31 NOTE — PROGRESS NOTES
12/31/24 1200   Discharge Planning   Living Arrangements Alone   Support Systems Family members   Type of Residence Private residence   Number of Stairs to Enter Residence 0   Number of Stairs Within Residence 0   Do you have animals or pets at home? No   Home or Post Acute Services Post acute facilities (Rehab/SNF/etc)   Type of Post Acute Facility Services Skilled nursing   Expected Discharge Disposition SNF   Does the patient need discharge transport arranged? Yes   RoundTrip coordination needed? Yes   Has discharge transport been arranged? No     SW obtained SNF choices from  - [facility], [facility], and [facility].  Referrals sent via [facility].  Precert is required prior to transfer.  Will follow.  KERVIN Leal

## 2025-01-01 PROBLEM — C34.90: Status: ACTIVE | Noted: 2025-01-01

## 2025-01-01 PROBLEM — D61.818 PANCYTOPENIA: Status: ACTIVE | Noted: 2024-12-27

## 2025-01-01 PROBLEM — J90 PLEURAL EFFUSION: Status: ACTIVE | Noted: 2025-01-01

## 2025-01-01 LAB
ABO GROUP (TYPE) IN BLOOD: NORMAL
ALBUMIN SERPL BCP-MCNC: 3 G/DL (ref 3.4–5)
ANION GAP SERPL CALC-SCNC: 13 MMOL/L (ref 10–20)
ANTIBODY SCREEN: NORMAL
BASOPHILS # BLD MANUAL: 0 X10*3/UL (ref 0–0.1)
BASOPHILS NFR BLD MANUAL: 0 %
BUN SERPL-MCNC: 11 MG/DL (ref 6–23)
CALCIUM SERPL-MCNC: 9 MG/DL (ref 8.6–10.6)
CHLORIDE SERPL-SCNC: 93 MMOL/L (ref 98–107)
CO2 SERPL-SCNC: 30 MMOL/L (ref 21–32)
CREAT SERPL-MCNC: 0.84 MG/DL (ref 0.5–1.05)
EGFRCR SERPLBLD CKD-EPI 2021: 73 ML/MIN/1.73M*2
EOSINOPHIL # BLD MANUAL: 0.06 X10*3/UL (ref 0–0.4)
EOSINOPHIL NFR BLD MANUAL: 2.5 %
ERYTHROCYTE [DISTWIDTH] IN BLOOD BY AUTOMATED COUNT: 15.6 % (ref 11.5–14.5)
GLUCOSE SERPL-MCNC: 204 MG/DL (ref 74–99)
HCT VFR BLD AUTO: 23.4 % (ref 36–46)
HGB BLD-MCNC: 7.6 G/DL (ref 12–16)
IMM GRANULOCYTES # BLD AUTO: 0.09 X10*3/UL (ref 0–0.5)
IMM GRANULOCYTES NFR BLD AUTO: 4.1 % (ref 0–0.9)
LYMPHOCYTES # BLD MANUAL: 0.58 X10*3/UL (ref 0.8–3)
LYMPHOCYTES NFR BLD MANUAL: 26.3 %
MAGNESIUM SERPL-MCNC: 1.71 MG/DL (ref 1.6–2.4)
MCH RBC QN AUTO: 25.6 PG (ref 26–34)
MCHC RBC AUTO-ENTMCNC: 32.5 G/DL (ref 32–36)
MCV RBC AUTO: 79 FL (ref 80–100)
MONOCYTES # BLD MANUAL: 0.24 X10*3/UL (ref 0.05–0.8)
MONOCYTES NFR BLD MANUAL: 11 %
MYELOCYTES # BLD MANUAL: 0.02 X10*3/UL
MYELOCYTES NFR BLD MANUAL: 0.8 %
NEUTROPHILS # BLD MANUAL: 1.3 X10*3/UL (ref 1.6–5.5)
NEUTS BAND # BLD MANUAL: 0.22 X10*3/UL (ref 0–0.5)
NEUTS BAND NFR BLD MANUAL: 10.2 %
NEUTS SEG # BLD MANUAL: 1.08 X10*3/UL (ref 1.6–5)
NEUTS SEG NFR BLD MANUAL: 49.2 %
NRBC BLD-RTO: 0 /100 WBCS (ref 0–0)
OVALOCYTES BLD QL SMEAR: ABNORMAL
PHOSPHATE SERPL-MCNC: 2.8 MG/DL (ref 2.5–4.9)
PLATELET # BLD AUTO: 89 X10*3/UL (ref 150–450)
POLYCHROMASIA BLD QL SMEAR: ABNORMAL
POTASSIUM SERPL-SCNC: 4.8 MMOL/L (ref 3.5–5.3)
RBC # BLD AUTO: 2.97 X10*6/UL (ref 4–5.2)
RBC MORPH BLD: ABNORMAL
RH FACTOR (ANTIGEN D): NORMAL
SCHISTOCYTES BLD QL SMEAR: ABNORMAL
SODIUM SERPL-SCNC: 131 MMOL/L (ref 136–145)
TOTAL CELLS COUNTED BLD: 118
WBC # BLD AUTO: 2.2 X10*3/UL (ref 4.4–11.3)

## 2025-01-01 PROCEDURE — 2500000001 HC RX 250 WO HCPCS SELF ADMINISTERED DRUGS (ALT 637 FOR MEDICARE OP)

## 2025-01-01 PROCEDURE — 86923 COMPATIBILITY TEST ELECTRIC: CPT

## 2025-01-01 PROCEDURE — 84100 ASSAY OF PHOSPHORUS: CPT

## 2025-01-01 PROCEDURE — 85007 BL SMEAR W/DIFF WBC COUNT: CPT

## 2025-01-01 PROCEDURE — 2500000002 HC RX 250 W HCPCS SELF ADMINISTERED DRUGS (ALT 637 FOR MEDICARE OP, ALT 636 FOR OP/ED)

## 2025-01-01 PROCEDURE — 99233 SBSQ HOSP IP/OBS HIGH 50: CPT

## 2025-01-01 PROCEDURE — 83735 ASSAY OF MAGNESIUM: CPT

## 2025-01-01 PROCEDURE — 1170000001 HC PRIVATE ONCOLOGY ROOM DAILY

## 2025-01-01 PROCEDURE — 85027 COMPLETE CBC AUTOMATED: CPT

## 2025-01-01 PROCEDURE — 2500000004 HC RX 250 GENERAL PHARMACY W/ HCPCS (ALT 636 FOR OP/ED)

## 2025-01-01 PROCEDURE — 86901 BLOOD TYPING SEROLOGIC RH(D): CPT

## 2025-01-01 RX ORDER — MAGNESIUM SULFATE HEPTAHYDRATE 40 MG/ML
2 INJECTION, SOLUTION INTRAVENOUS ONCE
Status: COMPLETED | OUTPATIENT
Start: 2025-01-01 | End: 2025-01-01

## 2025-01-01 RX ORDER — FUROSEMIDE 40 MG/1
40 TABLET ORAL ONCE
Status: COMPLETED | OUTPATIENT
Start: 2025-01-01 | End: 2025-01-01

## 2025-01-01 RX ADMIN — OLANZAPINE 2.5 MG: 5 TABLET, FILM COATED ORAL at 20:21

## 2025-01-01 RX ADMIN — MAGNESIUM SULFATE HEPTAHYDRATE 2 G: 2 INJECTION, SOLUTION INTRAVENOUS at 15:56

## 2025-01-01 RX ADMIN — HYDROMORPHONE HYDROCHLORIDE 2 MG: 2 TABLET ORAL at 17:08

## 2025-01-01 RX ADMIN — HYDROMORPHONE HYDROCHLORIDE 2 MG: 2 TABLET ORAL at 12:06

## 2025-01-01 RX ADMIN — LABETALOL HYDROCHLORIDE 200 MG: 200 TABLET, FILM COATED ORAL at 20:21

## 2025-01-01 RX ADMIN — PANTOPRAZOLE SODIUM 40 MG: 40 TABLET, DELAYED RELEASE ORAL at 08:50

## 2025-01-01 RX ADMIN — AMLODIPINE BESYLATE 10 MG: 10 TABLET ORAL at 08:50

## 2025-01-01 RX ADMIN — VANCOMYCIN HYDROCHLORIDE 125 MG: 125 CAPSULE ORAL at 17:08

## 2025-01-01 RX ADMIN — INSULIN LISPRO 1 UNITS: 100 INJECTION, SOLUTION INTRAVENOUS; SUBCUTANEOUS at 12:07

## 2025-01-01 RX ADMIN — VANCOMYCIN HYDROCHLORIDE 125 MG: 125 CAPSULE ORAL at 20:21

## 2025-01-01 RX ADMIN — POTASSIUM CHLORIDE 40 MEQ: 1500 TABLET, EXTENDED RELEASE ORAL at 08:50

## 2025-01-01 RX ADMIN — VANCOMYCIN HYDROCHLORIDE 125 MG: 125 CAPSULE ORAL at 08:49

## 2025-01-01 RX ADMIN — FUROSEMIDE 40 MG: 40 TABLET ORAL at 10:18

## 2025-01-01 RX ADMIN — ACYCLOVIR 800 MG: 400 TABLET ORAL at 08:50

## 2025-01-01 RX ADMIN — Medication 5 MG: at 19:08

## 2025-01-01 RX ADMIN — LOSARTAN POTASSIUM 25 MG: 25 TABLET, FILM COATED ORAL at 08:50

## 2025-01-01 RX ADMIN — LABETALOL HYDROCHLORIDE 200 MG: 200 TABLET, FILM COATED ORAL at 08:49

## 2025-01-01 RX ADMIN — VANCOMYCIN HYDROCHLORIDE 125 MG: 125 CAPSULE ORAL at 13:17

## 2025-01-01 ASSESSMENT — PAIN - FUNCTIONAL ASSESSMENT
PAIN_FUNCTIONAL_ASSESSMENT: 0-10

## 2025-01-01 ASSESSMENT — PAIN SCALES - GENERAL
PAINLEVEL_OUTOF10: 6
PAINLEVEL_OUTOF10: 7
PAINLEVEL_OUTOF10: 3
PAINLEVEL_OUTOF10: 6
PAINLEVEL_OUTOF10: 8

## 2025-01-01 ASSESSMENT — COGNITIVE AND FUNCTIONAL STATUS - GENERAL
DRESSING REGULAR UPPER BODY CLOTHING: A LOT
WALKING IN HOSPITAL ROOM: A LOT
MOVING FROM LYING ON BACK TO SITTING ON SIDE OF FLAT BED WITH BEDRAILS: A LITTLE
DRESSING REGULAR UPPER BODY CLOTHING: A LITTLE
TOILETING: A LOT
TURNING FROM BACK TO SIDE WHILE IN FLAT BAD: A LITTLE
MOVING TO AND FROM BED TO CHAIR: A LITTLE
MOVING TO AND FROM BED TO CHAIR: A LITTLE
HELP NEEDED FOR BATHING: A LOT
CLIMB 3 TO 5 STEPS WITH RAILING: A LOT
PERSONAL GROOMING: A LOT
CLIMB 3 TO 5 STEPS WITH RAILING: A LOT
STANDING UP FROM CHAIR USING ARMS: A LITTLE
PERSONAL GROOMING: A LITTLE
MOBILITY SCORE: 15
WALKING IN HOSPITAL ROOM: A LOT
DRESSING REGULAR LOWER BODY CLOTHING: A LOT
TURNING FROM BACK TO SIDE WHILE IN FLAT BAD: A LITTLE
MOVING FROM LYING ON BACK TO SITTING ON SIDE OF FLAT BED WITH BEDRAILS: A LITTLE
MOBILITY SCORE: 16
DRESSING REGULAR LOWER BODY CLOTHING: A LOT
TOILETING: A LITTLE
STANDING UP FROM CHAIR USING ARMS: A LOT
DAILY ACTIVITIY SCORE: 17
DAILY ACTIVITIY SCORE: 14
HELP NEEDED FOR BATHING: A LOT

## 2025-01-01 ASSESSMENT — PAIN DESCRIPTION - LOCATION: LOCATION: GENERALIZED

## 2025-01-01 ASSESSMENT — PAIN SCALES - PAIN ASSESSMENT IN ADVANCED DEMENTIA (PAINAD): TOTALSCORE: MEDICATION (SEE MAR)

## 2025-01-01 NOTE — CARE PLAN
The patient's goals for the shift include      The clinical goals for the shift include pt will remain HDS througout shift      Problem: Pain  Goal: Takes deep breaths with improved pain control throughout the shift  Outcome: Progressing  Goal: Turns in bed with improved pain control throughout the shift  Outcome: Progressing  Goal: Walks with improved pain control throughout the shift  Outcome: Progressing  Goal: Performs ADL's with improved pain control throughout shift  Outcome: Progressing  Goal: Participates in PT with improved pain control throughout the shift  Outcome: Progressing  Goal: Free from opioid side effects throughout the shift  Outcome: Progressing  Goal: Free from acute confusion related to pain meds throughout the shift  Outcome: Progressing     Problem: Safety - Adult  Goal: Free from fall injury  Outcome: Progressing     Problem: Discharge Planning  Goal: Discharge to home or other facility with appropriate resources  Outcome: Progressing     Problem: Chronic Conditions and Co-morbidities  Goal: Patient's chronic conditions and co-morbidity symptoms are monitored and maintained or improved  Outcome: Progressing     Problem: Respiratory  Goal: Minimize anxiety/maximize coping throughout shift  Outcome: Progressing  Goal: Minimal/no exertional discomfort or dyspnea this shift  Outcome: Progressing  Goal: No signs of respiratory distress (eg. Use of accessory muscles. Peds grunting)  Outcome: Progressing  Goal: Patent airway maintained this shift  Outcome: Progressing  Goal: Verbalize decreased shortness of breath this shift  Outcome: Progressing     Problem: Skin  Goal: Decreased wound size/increased tissue granulation at next dressing change  Outcome: Progressing  Flowsheets (Taken 1/1/2025 1004)  Decreased wound size/increased tissue granulation at next dressing change: Protective dressings over bony prominences  Goal: Participates in plan/prevention/treatment measures  Outcome:  Progressing  Flowsheets (Taken 1/1/2025 1004)  Participates in plan/prevention/treatment measures: Elevate heels  Goal: Prevent/manage excess moisture  Outcome: Progressing  Flowsheets (Taken 1/1/2025 1004)  Prevent/manage excess moisture: Monitor for/manage infection if present  Goal: Prevent/minimize sheer/friction injuries  Outcome: Progressing  Flowsheets (Taken 1/1/2025 1004)  Prevent/minimize sheer/friction injuries:   Use pull sheet   HOB 30 degrees or less   Turn/reposition every 2 hours/use positioning/transfer devices  Goal: Promote/optimize nutrition  Outcome: Progressing  Goal: Promote skin healing  Outcome: Progressing

## 2025-01-01 NOTE — CARE PLAN
Patient ID: [id number]     Chief Complaint:  Suture removal    History of Present Illness:     Jay Lindsey is a 19 y.o. male  who presents today for symptoms of Suture / Staple Removal      Here 5 days ago for laceration on chin, 5 simple interrupted sutures placed.  Here for removal.  Finished antibiotics, no discharge, healing well.  He has been using mupirocin daily.    Past Medical History:     -------------------------------------    Known health problems: none        Past Surgical History:   Procedure Laterality Date    TONSILLECTOMY      TYMPANOSTOMY TUBE PLACEMENT      WISDOM TOOTH EXTRACTION         Review of patient's allergies indicates:  No Known Allergies    Current Outpatient Medications   Medication Sig Dispense Refill    mupirocin (BACTROBAN) 2 % ointment Apply topically 3 (three) times daily. for 5 days (Patient not taking: Reported on 4/22/2024) 22 g 0     No current facility-administered medications for this visit.       Social History     Socioeconomic History    Marital status: Single   Tobacco Use    Smoking status: Never    Smokeless tobacco: Never   Substance and Sexual Activity    Alcohol use: Not Currently     Comment: occ    Drug use: Never        Family History   Problem Relation Name Age of Onset    No Known Problems Mother      No Known Problems Father      No Known Problems Sister      No Known Problems Brother          Subjective:     Review of Systems   Constitutional:  Negative for chills, fever and malaise/fatigue.   HENT:  Negative for congestion, ear discharge, ear pain, sinus pain and sore throat.    Respiratory:  Negative for cough, sputum production, shortness of breath, wheezing and stridor.    Gastrointestinal:  Negative for abdominal pain, diarrhea, nausea and vomiting.   Genitourinary:  Negative for dysuria, frequency and urgency.   Musculoskeletal:  Negative for neck pain.   Skin:  Negative for rash.        Suture removal   Neurological:  Negative for headaches.  The patient's goals for the shift include        Problem: Pain  Goal: Takes deep breaths with improved pain control throughout the shift  Outcome: Progressing  Goal: Turns in bed with improved pain control throughout the shift  Outcome: Progressing  Goal: Walks with improved pain control throughout the shift  Outcome: Progressing  Goal: Performs ADL's with improved pain control throughout shift  Outcome: Progressing  Goal: Participates in PT with improved pain control throughout the shift  Outcome: Progressing  Goal: Free from opioid side effects throughout the shift  Outcome: Progressing  Goal: Free from acute confusion related to pain meds throughout the shift  Outcome: Progressing     Problem: Safety - Adult  Goal: Free from fall injury  Outcome: Progressing     Problem: Discharge Planning  Goal: Discharge to home or other facility with appropriate resources  Outcome: Progressing     Problem: Chronic Conditions and Co-morbidities  Goal: Patient's chronic conditions and co-morbidity symptoms are monitored and maintained or improved  Outcome: Progressing     Problem: Skin  Goal: Decreased wound size/increased tissue granulation at next dressing change  Outcome: Progressing  Flowsheets (Taken 12/31/2024 2301)  Decreased wound size/increased tissue granulation at next dressing change: Protective dressings over bony prominences  Goal: Participates in plan/prevention/treatment measures  Outcome: Progressing  Flowsheets (Taken 12/31/2024 2301)  Participates in plan/prevention/treatment measures: Elevate heels  Goal: Prevent/manage excess moisture  Outcome: Progressing  Flowsheets (Taken 12/31/2024 2301)  Prevent/manage excess moisture:   Cleanse incontinence/protect with barrier cream   Monitor for/manage infection if present  Goal: Prevent/minimize sheer/friction injuries  Outcome: Progressing  Flowsheets (Taken 12/31/2024 2301)  Prevent/minimize sheer/friction injuries:   HOB 30 degrees or less   Turn/reposition every        Objective:     Vitals:    04/22/24 1624   BP: 128/80   Pulse: 104   Resp: 20   Temp: 97.8 °F (36.6 °C)     Body mass index is 22.15 kg/m².    Physical Exam  Constitutional:       General: He is not in acute distress.     Appearance: Normal appearance. He is normal weight. He is not ill-appearing or toxic-appearing.   HENT:      Head: Normocephalic and atraumatic.      Right Ear: Tympanic membrane and ear canal normal.      Left Ear: Tympanic membrane and ear canal normal.      Nose: No congestion or rhinorrhea.      Mouth/Throat:      Pharynx: Oropharynx is clear. No oropharyngeal exudate or posterior oropharyngeal erythema.   Eyes:      General:         Right eye: No discharge.         Left eye: No discharge.      Extraocular Movements: Extraocular movements intact.      Conjunctiva/sclera: Conjunctivae normal.   Cardiovascular:      Rate and Rhythm: Normal rate and regular rhythm.      Heart sounds: Normal heart sounds. No murmur heard.     No friction rub. No gallop.   Pulmonary:      Effort: Pulmonary effort is normal. No respiratory distress.      Breath sounds: Normal breath sounds. No stridor. No wheezing, rhonchi or rales.   Chest:      Chest wall: No tenderness.   Musculoskeletal:      Cervical back: No rigidity or tenderness.   Lymphadenopathy:      Cervical: No cervical adenopathy.   Skin:     Coloration: Skin is not pale.      Comments: Five simple interrupted sutures on chin.  Wound healing nicely, minimal scabbing, wound edges approximated well.  No discharge, erythema, warmth, or tenderness.  Ready for removal.   Neurological:      Mental Status: He is alert and oriented to person, place, and time. Mental status is at baseline.   Psychiatric:         Mood and Affect: Mood normal.         Behavior: Behavior normal.         Assessment & Plan:       ICD-10-CM ICD-9-CM   1. Chin laceration, subsequent encounter  S01.81XD V58.89     873.44   2. Visit for suture removal  Z48.02 V58.32        1. Chin  2 hours/use positioning/transfer devices   Increase activity/out of bed for meals  Goal: Promote/optimize nutrition  Outcome: Progressing  Flowsheets (Taken 12/31/2024 2301)  Promote/optimize nutrition:   Monitor/record intake including meals   Consume > 50% meals/supplements  Goal: Promote skin healing  Outcome: Progressing  Flowsheets (Taken 12/31/2024 2301)  Promote skin healing: Turn/reposition every 2 hours/use positioning/transfer devices      laceration, subsequent encounter  -     Suture Removal    2. Visit for suture removal  -     Suture Removal         Five simple interrupted sutures removed per quick procedure note.  Continue mupirocin until fully healed.  Follow up as needed

## 2025-01-01 NOTE — PROGRESS NOTES
Daily Progress Note    Alejandra Smith is a 74 y.o. female on day 4 of admission presenting with Anemia.    Subjective   NAEON.   Patient had some slight worsening in her WOB today. Reports decrease in bowel movements from 5--> 3. Patient denies fevers chills, and chest pain.     10 point ROS performed and negative unless stated above.          Objective   Weight: 102 kg (225 lb) (12/29/24 1247)    Daily Weight  12/29/24 : 102 kg (225 lb)      Last Recorded Vitals  Heart Rate:  [63-77]   Temp:  [36.4 °C (97.6 °F)-36.7 °C (98.1 °F)]   Resp:  [18-20]   BP: (133-151)/(67-80)   SpO2:  [96 %-99 %]      Physical Exam  Constitutional: no acute distress  Respiratory: No wheezes, rales, or rhonchi. Normal respiratory effort.  Cardiovascular: RRR, No murmurs, gallops, or rubs  Abdominal: Soft, nontender, nondistended. Bowel sounds present  Neuro: AAOx 4, CN II-XII grossly intact  MSK: No LE edema bilaterally, moving extremities well  Skin: Warm, dry. Well perfused  Psych: Appropriate mood and affect      Intake/Output last 3 Shifts:  I/O last 3 completed shifts:  In: 1411.3 (13.8 mL/kg) [P.O.:1320; I.V.:91.3 (0.9 mL/kg)]  Out: 900 (8.8 mL/kg) [Urine:900 (0.2 mL/kg/hr)]  Weight: 102.1 kg     Medications  Scheduled medications  acyclovir, 800 mg, oral, q24h KIMMY  amLODIPine, 10 mg, oral, Daily  insulin lispro, 0-5 Units, subcutaneous, TID AC  labetalol, 200 mg, oral, BID  lidocaine, 2 patch, transdermal, Daily  losartan, 25 mg, oral, Daily  melatonin, 5 mg, oral, Daily  OLANZapine, 2.5 mg, oral, Nightly  pantoprazole, 40 mg, oral, Daily before breakfast  potassium chloride CR, 40 mEq, oral, BID  vancomycin, 125 mg, oral, 4x daily      Continuous medications     PRN medications  PRN medications: albuterol, dextrose, dextrose, glucagon, glucagon, HYDROmorphone, HYDROmorphone, hydrOXYzine HCL, methocarbamol, ondansetron ODT       Relevant Results    Labs  Results from last 7 days   Lab Units 01/01/25  1236 12/31/24  8959  "12/30/24  1233 12/29/24  0639 12/28/24  0151   SODIUM mmol/L 131* 131* 132* 136 133*   POTASSIUM mmol/L 4.8 3.5 2.8* 3.1* 3.4*   CHLORIDE mmol/L 93* 88* 89* 92* 92*   CO2 mmol/L 30 31 30 31 31   BUN mg/dL 11 13 10 10 12   CREATININE mg/dL 0.84 1.09* 0.81 0.65 0.62   CALCIUM mg/dL 9.0 8.1* 7.4* 7.3* 7.4*      Results from last 7 days   Lab Units 01/01/25  1236 12/31/24  1135 12/30/24  1233 12/29/24  0639 12/28/24  1605   HEMOGLOBIN g/dL 7.6* 7.5* 7.9* 7.8* 8.6*   WBC AUTO x10*3/uL 2.2* 1.1* 0.7* 0.6* 0.5*   PLATELETS AUTO x10*3/uL 89* 48* 22* 11* 13*   HEMATOCRIT % 23.4* 22.7* 22.4* 21.8* 23.9*     Results from last 7 days   Lab Units 12/31/24  1135 12/28/24  0151   ALK PHOS U/L 122 141*   BILIRUBIN TOTAL mg/dL 0.6 1.1   BILIRUBIN DIRECT mg/dL  --  0.2   PROTEIN TOTAL g/dL 6.2* 5.3*   ALT U/L 11 13   AST U/L 8* 11      Results from last 7 days   Lab Units 12/28/24  0151   PROTIME seconds 13.2*   INR  1.2*   APTT seconds 29     No results found for: \"NONUHFIRE\", \"LACTATE\"    Results from last 7 days   Lab Units 01/01/25  1236 12/31/24  1135 12/30/24  1233 12/29/24  0639 12/28/24  0151   GLUCOSE mg/dL 204* 144* 126* 124* 127*       Imaging  Bedside Midline Imaging    Result Date: 12/28/2024  These images are not reportable by radiology and will not be interpreted by  Radiologists.    XR chest 1 view    Result Date: 12/28/2024  Interpreted By:  Luke Bearden, STUDY: XR CHEST 1 VIEW;  12/28/2024 3:07 pm   INDICATION: Signs/Symptoms:SOB.   COMPARISON: None.   ACCESSION NUMBER(S): [id number]   ORDERING CLINICIAN: ISMAEL SNYDER   FINDINGS: AP radiograph of the chest. Patient's is significantly rotated towards right, limiting evaluation. There is poorly visualized tubular structure projecting over left lung base, which may represent a chest tube.   CARDIOMEDIASTINAL SILHOUETTE: The cardiomediastinal silhouette appears enlarged, exaggerated by patient rotation.   LUNGS: There is at least moderate-sized left pleural effusion " and associated atelectasis/consolidation. There is mild right basilar atelectasis suggestion of trace/small right pleural effusion. Low lung volumes with bronchovascular crowding with suggestion of mild pulmonary edema. There is no pneumothorax.   ABDOMEN: No remarkable upper abdominal findings.   BONES: No acute osseous abnormality.       Limited examination because of significant patient rotation within this limitation: 1. At least moderate-sized left pleural effusion and associated atelectasis/consolidation. Poorly visualized tubular structure projecting over left lung base, which may represent a chest tube. Correlate clinically. 2. Mild right basilar atelectasis suggestion of trace/small right pleural effusion. 3. Suggestion of mild pulmonary edema.   Signed by: Luke Bearden 12/28/2024 3:30 PM Dictation workstation:   [id number]                Assessment/Plan   Assessment & Plan  Anemia      Alejandra Smith is a 74 y.o. female w/ a PMH Stage IV small cell lung cancer s/p 2 cycles carbo/etoposide/atezolizumab with liver mets, sjogrens, SLE, T2DM, HTN, HFpEF, Afib who presented to [facility] with history of malaise, dry heave, fatigue. She was found to be pancytopenic requiring transfusion. She has tunneled L pleural catheter placed due to L pleural effusion, likely malignant, and she drains this every other day at her SNF. She was transferred to  for oncology care. 12/30 patient with positive C.Diff PCR/toxin so oral vancomycin was started.      Updates 1/1/24  - Patient now with working Midline   - Chest tube drained today   - Gave 40 lasix given increased WOB    - Continuing PO vancomycin for C.Diff infection         #Stage IV SCLC w/ mets to liver   ::Cytology report at  shows positive small cell cancer  :: s/p 2 cycles carbo/etoposide/atezolizumab   :: CT scan showed decrease in size of liver mets suggesting good response to therapy  ::On 4-6L NC  :: Left tunneled cath placed 11/8/2024, drains L  pleural effusion every other day at SNF  - dietician consulted to optimize nutrition  - avoid steroids given active immunotherapy     #Pancytopenia  #Anemia  ::Likely secondary to her chemotherapy -> appears last cycle was 12/18, making now her genevieve   ::Hg 5.7 at OSH, was given 2 units PRBC -> 7.5 on admission  :: reported 1x bloody stool after LORIN at OSH, low concern for GI bleed, no indication for colonoscopy, no further bloody stools. Also want to avoid colonoscopy and LORIN with severe neutropenia  -Sent Type/Screen, Verab, consented  -blood smear ordered  -c/w daily acyclovir 800mg for VXV ppx per documentation  - transfuse for Hgb < 7 and platelets < 10 or < 20 with active bleeding  - Midline placed for IV access       #C.Diff infection   :: C.Diff PCR positive, C difficile Toxin  A/B positive   :: Patient with continued diarrhea   - Started PO Vanc 125 mg 4x daily for 10 days      #Paroxysmal Afib  -On labtalol 200mg BID  - patient refusing telemetry, will monitor on PE daily for regular vs irregular HR and encourage tele monitoring      #HFpEF  ::Last reported EF 55-60%, grade 2 diastolic dysfunction, mild LVH, mild RV enlargement at OSH  -Lasix 40mg daily  -Losartan 20mg daily     #GERD  -omeprazole 40mg daily     #T2DM  ::No A1c baseline  -30 units glargine at home, halved while inpatient to 15 units  -SSI  -A1c ordered     #YOVANA  -Rt consult for cpap settings        Medical Check List   FEN  -Fluids: Will replete PRN   -Electrolytes: Will replete PRN, with goals of Mg >2, K>4  -Nutrition: regular- ensure supplements chocolate flavored  Prophylaxis:  -DVT ppx: holding w/ thrombocytopenia   -GI ppx/Bowel care: PPI  -Abx: None- neutropenic precautions, if febrile cultures + abx   -Pain regimen: dilaudid 2mg q4 hours  Hardware:  -Drains: L tunneled pleural catheter  -Lines: PIV  Social:  -Code: Full Code  -NOK/Surrogate Decision Maker: Bill Calixa (Sibling)  [phone] (Home Phone)      Patient assessment  and plan staffed with the attending physician on service, Dr. Wang.     Zohra Acharya MD  Internal Medicine PGY-1

## 2025-01-02 LAB
ALBUMIN SERPL BCP-MCNC: 3 G/DL (ref 3.4–5)
ANION GAP SERPL CALC-SCNC: 15 MMOL/L (ref 10–20)
ATRIAL RATE: 71 BPM
BASOPHILS # BLD AUTO: 0.01 X10*3/UL (ref 0–0.1)
BASOPHILS NFR BLD AUTO: 0.3 %
BUN SERPL-MCNC: 12 MG/DL (ref 6–23)
CALCIUM SERPL-MCNC: 9 MG/DL (ref 8.6–10.6)
CHLORIDE SERPL-SCNC: 93 MMOL/L (ref 98–107)
CO2 SERPL-SCNC: 27 MMOL/L (ref 21–32)
CREAT SERPL-MCNC: 0.83 MG/DL (ref 0.5–1.05)
EGFRCR SERPLBLD CKD-EPI 2021: 74 ML/MIN/1.73M*2
EOSINOPHIL # BLD AUTO: 0.01 X10*3/UL (ref 0–0.4)
EOSINOPHIL NFR BLD AUTO: 0.3 %
ERYTHROCYTE [DISTWIDTH] IN BLOOD BY AUTOMATED COUNT: 15.8 % (ref 11.5–14.5)
GLUCOSE SERPL-MCNC: 240 MG/DL (ref 74–99)
HCT VFR BLD AUTO: 20.9 % (ref 36–46)
HGB BLD-MCNC: 6.8 G/DL (ref 12–16)
IMM GRANULOCYTES # BLD AUTO: 0.13 X10*3/UL (ref 0–0.5)
IMM GRANULOCYTES NFR BLD AUTO: 3.7 % (ref 0–0.9)
LYMPHOCYTES # BLD AUTO: 0.61 X10*3/UL (ref 0.8–3)
LYMPHOCYTES NFR BLD AUTO: 17.4 %
MAGNESIUM SERPL-MCNC: 1.71 MG/DL (ref 1.6–2.4)
MCH RBC QN AUTO: 25.9 PG (ref 26–34)
MCHC RBC AUTO-ENTMCNC: 32.5 G/DL (ref 32–36)
MCV RBC AUTO: 80 FL (ref 80–100)
MONOCYTES # BLD AUTO: 0.45 X10*3/UL (ref 0.05–0.8)
MONOCYTES NFR BLD AUTO: 12.8 %
NEUTROPHILS # BLD AUTO: 2.3 X10*3/UL (ref 1.6–5.5)
NEUTROPHILS NFR BLD AUTO: 65.5 %
NRBC BLD-RTO: 0 /100 WBCS (ref 0–0)
PHOSPHATE SERPL-MCNC: 3.2 MG/DL (ref 2.5–4.9)
PLATELET # BLD AUTO: 119 X10*3/UL (ref 150–450)
POTASSIUM SERPL-SCNC: 4.5 MMOL/L (ref 3.5–5.3)
PR INTERVAL: 160 MS
Q ONSET: 217 MS
QRS COUNT: 11 BEATS
QRS DURATION: 92 MS
QT INTERVAL: 398 MS
QTC CALCULATION(BAZETT): 432 MS
QTC FREDERICIA: 421 MS
R AXIS: 42 DEGREES
RBC # BLD AUTO: 2.63 X10*6/UL (ref 4–5.2)
SODIUM SERPL-SCNC: 130 MMOL/L (ref 136–145)
T AXIS: 21 DEGREES
T OFFSET: 416 MS
VENTRICULAR RATE: 71 BPM
WBC # BLD AUTO: 3.5 X10*3/UL (ref 4.4–11.3)

## 2025-01-02 PROCEDURE — 99233 SBSQ HOSP IP/OBS HIGH 50: CPT

## 2025-01-02 PROCEDURE — 80069 RENAL FUNCTION PANEL: CPT

## 2025-01-02 PROCEDURE — 2500000001 HC RX 250 WO HCPCS SELF ADMINISTERED DRUGS (ALT 637 FOR MEDICARE OP)

## 2025-01-02 PROCEDURE — 85025 COMPLETE CBC W/AUTO DIFF WBC: CPT

## 2025-01-02 PROCEDURE — 2500000002 HC RX 250 W HCPCS SELF ADMINISTERED DRUGS (ALT 637 FOR MEDICARE OP, ALT 636 FOR OP/ED)

## 2025-01-02 PROCEDURE — 97165 OT EVAL LOW COMPLEX 30 MIN: CPT | Mod: GO

## 2025-01-02 PROCEDURE — 83735 ASSAY OF MAGNESIUM: CPT

## 2025-01-02 PROCEDURE — 1170000001 HC PRIVATE ONCOLOGY ROOM DAILY

## 2025-01-02 RX ORDER — ENOXAPARIN SODIUM 100 MG/ML
40 INJECTION SUBCUTANEOUS EVERY 24 HOURS
Status: DISCONTINUED | OUTPATIENT
Start: 2025-01-02 | End: 2025-01-04 | Stop reason: HOSPADM

## 2025-01-02 RX ADMIN — OLANZAPINE 2.5 MG: 5 TABLET, FILM COATED ORAL at 20:30

## 2025-01-02 RX ADMIN — HYDROMORPHONE HYDROCHLORIDE 2 MG: 2 TABLET ORAL at 20:36

## 2025-01-02 RX ADMIN — LABETALOL HYDROCHLORIDE 200 MG: 200 TABLET, FILM COATED ORAL at 09:24

## 2025-01-02 RX ADMIN — LABETALOL HYDROCHLORIDE 200 MG: 200 TABLET, FILM COATED ORAL at 20:30

## 2025-01-02 RX ADMIN — INSULIN LISPRO 1 UNITS: 100 INJECTION, SOLUTION INTRAVENOUS; SUBCUTANEOUS at 09:21

## 2025-01-02 RX ADMIN — LOSARTAN POTASSIUM 25 MG: 25 TABLET, FILM COATED ORAL at 09:23

## 2025-01-02 RX ADMIN — PANTOPRAZOLE SODIUM 40 MG: 40 TABLET, DELAYED RELEASE ORAL at 09:21

## 2025-01-02 RX ADMIN — VANCOMYCIN HYDROCHLORIDE 125 MG: 125 CAPSULE ORAL at 12:33

## 2025-01-02 RX ADMIN — INSULIN LISPRO 3 UNITS: 100 INJECTION, SOLUTION INTRAVENOUS; SUBCUTANEOUS at 12:33

## 2025-01-02 RX ADMIN — INSULIN LISPRO 1 UNITS: 100 INJECTION, SOLUTION INTRAVENOUS; SUBCUTANEOUS at 16:00

## 2025-01-02 RX ADMIN — HYDROMORPHONE HYDROCHLORIDE 2 MG: 2 TABLET ORAL at 12:33

## 2025-01-02 RX ADMIN — ACYCLOVIR 800 MG: 400 TABLET ORAL at 09:21

## 2025-01-02 RX ADMIN — VANCOMYCIN HYDROCHLORIDE 125 MG: 125 CAPSULE ORAL at 20:30

## 2025-01-02 RX ADMIN — VANCOMYCIN HYDROCHLORIDE 125 MG: 125 CAPSULE ORAL at 09:21

## 2025-01-02 RX ADMIN — AMLODIPINE BESYLATE 10 MG: 10 TABLET ORAL at 09:23

## 2025-01-02 RX ADMIN — VANCOMYCIN HYDROCHLORIDE 125 MG: 125 CAPSULE ORAL at 16:00

## 2025-01-02 RX ADMIN — Medication 5 MG: at 20:30

## 2025-01-02 ASSESSMENT — PAIN SCALES - GENERAL
PAINLEVEL_OUTOF10: 4
PAINLEVEL_OUTOF10: 7
PAINLEVEL_OUTOF10: 0 - NO PAIN
PAINLEVEL_OUTOF10: 8

## 2025-01-02 ASSESSMENT — COGNITIVE AND FUNCTIONAL STATUS - GENERAL
DRESSING REGULAR UPPER BODY CLOTHING: A LITTLE
DRESSING REGULAR LOWER BODY CLOTHING: A LOT
MOVING TO AND FROM BED TO CHAIR: A LITTLE
DRESSING REGULAR LOWER BODY CLOTHING: A LOT
TOILETING: A LITTLE
PERSONAL GROOMING: A LITTLE
HELP NEEDED FOR BATHING: A LOT
DAILY ACTIVITIY SCORE: 16
HELP NEEDED FOR BATHING: A LOT
DAILY ACTIVITIY SCORE: 16
STANDING UP FROM CHAIR USING ARMS: A LITTLE
DRESSING REGULAR LOWER BODY CLOTHING: A LOT
STANDING UP FROM CHAIR USING ARMS: A LITTLE
DRESSING REGULAR UPPER BODY CLOTHING: A LITTLE
DRESSING REGULAR UPPER BODY CLOTHING: A LITTLE
HELP NEEDED FOR BATHING: A LOT
HELP NEEDED FOR BATHING: A LOT
DRESSING REGULAR UPPER BODY CLOTHING: A LITTLE
DAILY ACTIVITIY SCORE: 17
MOBILITY SCORE: 18
STANDING UP FROM CHAIR USING ARMS: A LITTLE
WALKING IN HOSPITAL ROOM: A LOT
TURNING FROM BACK TO SIDE WHILE IN FLAT BAD: A LITTLE
TOILETING: A LITTLE
TOILETING: A LOT
PERSONAL GROOMING: A LITTLE
MOBILITY SCORE: 16
WALKING IN HOSPITAL ROOM: A LOT
WALKING IN HOSPITAL ROOM: A LITTLE
TOILETING: A LOT
MOVING TO AND FROM BED TO CHAIR: A LITTLE
PERSONAL GROOMING: A LITTLE
PERSONAL GROOMING: A LITTLE
CLIMB 3 TO 5 STEPS WITH RAILING: A LITTLE
MOVING TO AND FROM BED TO CHAIR: A LITTLE
CLIMB 3 TO 5 STEPS WITH RAILING: A LOT
DRESSING REGULAR LOWER BODY CLOTHING: A LOT
TURNING FROM BACK TO SIDE WHILE IN FLAT BAD: A LITTLE
TURNING FROM BACK TO SIDE WHILE IN FLAT BAD: A LITTLE
MOBILITY SCORE: 16
MOVING FROM LYING ON BACK TO SITTING ON SIDE OF FLAT BED WITH BEDRAILS: A LITTLE
DAILY ACTIVITIY SCORE: 17
MOVING FROM LYING ON BACK TO SITTING ON SIDE OF FLAT BED WITH BEDRAILS: A LITTLE
CLIMB 3 TO 5 STEPS WITH RAILING: A LOT
MOVING FROM LYING ON BACK TO SITTING ON SIDE OF FLAT BED WITH BEDRAILS: A LITTLE

## 2025-01-02 ASSESSMENT — PAIN - FUNCTIONAL ASSESSMENT
PAIN_FUNCTIONAL_ASSESSMENT: 0-10

## 2025-01-02 ASSESSMENT — ACTIVITIES OF DAILY LIVING (ADL)
BATHING_ASSISTANCE: MODERATE
ADL_ASSISTANCE: NEEDS ASSISTANCE

## 2025-01-02 ASSESSMENT — PAIN DESCRIPTION - LOCATION: LOCATION: GENERALIZED

## 2025-01-02 NOTE — PROGRESS NOTES
01/02/25 1100   Discharge Planning   Living Arrangements Alone   Support Systems Family members   Type of Residence Private residence   Number of Stairs to Enter Residence 0   Number of Stairs Within Residence 0   Do you have animals or pets at home? No   Home or Post Acute Services Post acute facilities (Rehab/SNF/etc)   Type of Post Acute Facility Services Skilled nursing   Expected Discharge Disposition SNF   Does the patient need discharge transport arranged? Yes   RoundTrip coordination needed? Yes   Has discharge transport been arranged? No     GARETH met with pt.  She wishes to return to [facility].  Pt also has new insurance.  Pt provided copies of new card.  SW emailed new insurance information to [facility].  Referral sent to [facility] via [facility].  Precert is required prior to transfer.  Will follow.  KERVIN Leal

## 2025-01-02 NOTE — CARE PLAN
The patient's goals for the shift include        Problem: Pain  Goal: Takes deep breaths with improved pain control throughout the shift  Outcome: Progressing  Goal: Turns in bed with improved pain control throughout the shift  Outcome: Progressing  Goal: Walks with improved pain control throughout the shift  Outcome: Progressing  Goal: Performs ADL's with improved pain control throughout shift  Outcome: Progressing  Goal: Participates in PT with improved pain control throughout the shift  Outcome: Progressing  Goal: Free from opioid side effects throughout the shift  Outcome: Progressing  Goal: Free from acute confusion related to pain meds throughout the shift  Outcome: Progressing     Problem: Safety - Adult  Goal: Free from fall injury  Outcome: Progressing     Problem: Discharge Planning  Goal: Discharge to home or other facility with appropriate resources  Outcome: Progressing     Problem: Respiratory  Goal: Minimize anxiety/maximize coping throughout shift  Outcome: Progressing  Goal: Minimal/no exertional discomfort or dyspnea this shift  Outcome: Progressing  Goal: No signs of respiratory distress (eg. Use of accessory muscles. Peds grunting)  Outcome: Progressing  Goal: Patent airway maintained this shift  Outcome: Progressing  Goal: Verbalize decreased shortness of breath this shift  Outcome: Progressing     Problem: Skin  Goal: Decreased wound size/increased tissue granulation at next dressing change  Outcome: Progressing  Flowsheets (Taken 1/1/2025 2037)  Decreased wound size/increased tissue granulation at next dressing change: Protective dressings over bony prominences  Goal: Participates in plan/prevention/treatment measures  Outcome: Progressing  Flowsheets (Taken 1/1/2025 2037)  Participates in plan/prevention/treatment measures: Elevate heels  Goal: Prevent/manage excess moisture  Outcome: Progressing  Flowsheets (Taken 1/1/2025 2037)  Prevent/manage excess moisture: Monitor for/manage infection  if present  Goal: Prevent/minimize sheer/friction injuries  Outcome: Progressing  Flowsheets (Taken 1/1/2025 2037)  Prevent/minimize sheer/friction injuries:   Use pull sheet   HOB 30 degrees or less   Turn/reposition every 2 hours/use positioning/transfer devices  Goal: Promote/optimize nutrition  Outcome: Progressing  Flowsheets (Taken 1/1/2025 2037)  Promote/optimize nutrition:   Monitor/record intake including meals   Consume > 50% meals/supplements  Goal: Promote skin healing  Outcome: Progressing  Flowsheets (Taken 1/1/2025 2037)  Promote skin healing: Turn/reposition every 2 hours/use positioning/transfer devices

## 2025-01-02 NOTE — CARE PLAN
The patient's goals for the shift include      The clinical goals for the shift include pt will remain  HDS    Problem: Skin  Goal: Decreased wound size/increased tissue granulation at next dressing change  Flowsheets (Taken 1/2/2025 1802)  Decreased wound size/increased tissue granulation at next dressing change: Promote sleep for wound healing  Goal: Participates in plan/prevention/treatment measures  Flowsheets (Taken 1/2/2025 1802)  Participates in plan/prevention/treatment measures: Elevate heels  Goal: Prevent/manage excess moisture  Flowsheets (Taken 1/2/2025 1802)  Prevent/manage excess moisture: Monitor for/manage infection if present  Goal: Prevent/minimize sheer/friction injuries  Flowsheets (Taken 1/2/2025 1802)  Prevent/minimize sheer/friction injuries: Use pull sheet  Goal: Promote/optimize nutrition  Flowsheets (Taken 1/2/2025 1802)  Promote/optimize nutrition: Monitor/record intake including meals  Goal: Promote skin healing  Flowsheets (Taken 1/2/2025 1802)  Promote skin healing: Assess skin/pad under line(s)/device(s)

## 2025-01-02 NOTE — CONSULTS
Wound Care Consult     Visit Date: 1/2/2025      Patient Name: Alejandra Smith         MRN: [id number]           YOB: 1950     Reason for Consult: assess coccyx wound and right leg wound         Wound History: Patient on day 4 of admission presenting with Anemia.      Pertinent Labs:   Albumin   Date Value Ref Range Status   01/02/2025 3.0 (L) 3.4 - 5.0 g/dL Final       Wound Assessment:  Wound 12/28/24 Buttock (Active)   Wound Image   12/28/24 0817   State of Healing Early/partial granulation 12/28/24 2100   Margins Not attached 12/28/24 2100   Drainage Description None 01/01/25 2015   Drainage Amount None 01/01/25 2015   Dressing Foam 01/02/25 0900   Dressing Changed Changed 01/01/25 2015   Dressing Status Clean;Dry 01/02/25 0900       Wound 12/28/24 Pressure Injury Coccyx (Active)   Wound Image   01/02/25 1309   Site Assessment Red 01/02/25 1309   Gini-Wound Assessment Maceration 01/02/25 1309   Pressure Injury Stage 3 01/02/25 1309   Wound Length (cm) 1 cm 01/02/25 1309   Wound Width (cm) 0.6 cm 01/02/25 1309   Wound Surface Area (cm^2) 0.6 cm^2 01/02/25 1309   Wound Depth (cm) 0.4 cm 01/02/25 1309   Wound Volume (cm^3) 0.24 cm^3 01/02/25 1309   State of Healing Early/partial granulation 12/28/24 2100   Margins Not attached 01/01/25 2015   Drainage Description Clear 01/02/25 1309   Drainage Amount Scant 01/02/25 1309   Dressing Hydrophilic;Silicone border dressing 01/02/25 1309   Dressing Changed New 01/02/25 1309   Dressing Status Clean 01/02/25 1309       Wound 01/02/25 Pretibial Right (Active)   Wound Image   01/02/25 1319   Wound Length (cm) 0.7 cm 01/02/25 1319   Wound Width (cm) 1 cm 01/02/25 1319   Wound Surface Area (cm^2) 0.7 cm^2 01/02/25 1319   Wound Depth (cm) 0.2 cm 01/02/25 1319   Wound Volume (cm^3) 0.14 cm^3 01/02/25 1319   Drainage Description Purulent 01/02/25 1319   Drainage Amount Scant 01/02/25 1319   Dressing Silicone border dressing 01/02/25 1319   Dressing Changed  Changed 01/02/25 1319   Dressing Status Clean 01/02/25 1319       Wound Team Summary Assessment: Patient has 2 areas of scarring from previous pressure injuries on sacrum. Coccyx is a stage 3 pressure injury that is red tissue at base of wound and mild maceration.    Patient states that she has had a nonhealing scab on right lower leg incision line since July, 2024. She brushed her hand over her knee this morning, the scab dislodged with pustulant drainage. Area is red around opening, nontender.     Recommendation for coccyx:  Cleanse coccyx wound with vashe   Apply Triad  wound dressing   Cover with Mepilex border dressing   Turn every 2 hours   Maintain waffle matress and air taps     Recommendations for right knee:  Cleanse Right knee wound with Vashe   Cover right knee wound with Mepilex     Patient is concerned for drainage from old incisional wound. Would condsider imaging        Wound Team Plan: Please review recommendation      Magda DOWNEY   1/2/2025  3:29 PM

## 2025-01-02 NOTE — PROGRESS NOTES
Occupational Therapy    Evaluation    Patient Name: Alejandra Smith  MRN: [id number]  Today's Date: 1/2/2025  Room: [address] [place] [address]  Time Calculation  Start Time: 1447  Stop Time: 1500  Time Calculation (min): 13 min    Assessment  IP OT Assessment  OT Assessment: Pt's abilty to complete ADLs currently limited by deficits in functional strength, activity tolerance, and dynamic balance. The pt demos the ability to complete the majority of ADL tasks with Mod - SBA. Pt will benefit from continued OT while admitted to increase IND and safety prior to d/c.  Prognosis: Fair  Barriers to Discharge Home: Physical needs  Physical Needs: Returning to long-term care/other facility, 24hr mobility assistance needed, 24hr ADL assistance needed, High falls risk due to function or environment  Evaluation/Treatment Tolerance: Patient limited by fatigue  Medical Staff Made Aware: Yes  End of Session Communication: Bedside nurse  End of Session Patient Position: Alarm off, not on at start of session (Sitting EOB)  Plan:  Inpatient Plan  Treatment Interventions: ADL retraining, Functional transfer training, Endurance training, Patient/family training, Equipment evaluation/education, Compensatory technique education  OT Frequency: 2 times per week  OT Discharge Recommendations: Moderate intensity level of continued care  OT Recommended Transfer Status: Assist of 2  OT - OK to Discharge: Yes  OT Assessment  OT Assessment Results: Decreased ADL status, Decreased endurance, Decreased functional mobility, Decreased IADLs  Prognosis: Fair  Barriers to Discharge: Decreased caregiver support (CLOF)  Evaluation/Treatment Tolerance: Patient limited by fatigue  Medical Staff Made Aware: Yes  Strengths: Attitude of self, Housing layout  Barriers to Participation: Comorbidities    Subjective   Current Problem:  1. SCLC (small cell lung carcinoma), unspecified laterality  Drain/Tube Care    Referral to Primary Care - Family Practice    Referral to  Physical Therapy    Oxygen therapy for home      2. Pleural effusion  Drain/Tube Care      3. Pancytopenia  CBC        General:  Reason for Referral: malaise, dry heave, fatigue  Past Medical History Relevant to Rehab: newly diagnosed small cell lung cancer with liver mets, sjogrens, SLE, T2DM, HTN, Afib  Prior to Session Communication: Bedside nurse  Patient Position Received: Bed, 3 rail up, Alarm off, not on at start of session  Family/Caregiver Present: No  General Comment: Pt pleasant and agreeable to OT evaluation - reports feeling fatigued but willing to participate. Reports needing to call and setup transportation for a doctors appointment   Precautions:  Medical Precautions: Fall precautions  Precautions Comment: contact +,  c diff  Pain:  Pain Assessment  Pain Assessment: 0-10  0-10 (Numeric) Pain Score: 0 - No pain (at rest)  Lines/Tubes/Drains:  Midline 12/28/24 Single lumen Right Basilic vein (Active)   Number of days: 4       Chest Tube Left Pleural (Active)   Number of days:        External Urinary Catheter Female (Active)   Number of days: 5         Objective   Cognition:  Overall Cognitive Status: Within Functional Limits  Orientation Level: Oriented X4   Home Living:  Type of Home: Skilled Nursing facility  Home Living Comments: living in SNF since 11/24. Reports prior was living in an apartment on her own - 4th floor with elevator.   Prior Function:  Level of [place]: Needs assistance with ADLs, Needs assistance with homemaking, Needs assistance with functional transfers  Receives Help From:  (Facility)  ADL Assistance: Needs assistance (Mainly bed bound for past few months- prior was requiring some assistance for most ADLs)  Homemaking Assistance: Needs assistance  Ambulatory Assistance: Independent (stand pivot transfers bed to w/c in apartment)  Vocational: Retired  Prior Function Comments: - falls  IADL History:  Homemaking Responsibilities: No  Current License: No  Occupation:  "Retired  ADL:  Eating Assistance: Independent (Anticipated)  Grooming Assistance: Stand by (Anticipated - seated)  Bathing Assistance: Moderate (Anticipated)  UE Dressing Assistance: Stand by (Anticipated)  UE Dressing Deficit: Supervision/safety  LE Dressing Assistance: Moderate (pt able to manage clothing in figure four, anticipate assist needed to stand and pull up hips)  Toileting Assistance with Device: Moderate (Anticipated)  ADL Comments: Limited by fatigue  Activity Tolerance:  Endurance: Tolerates 10 - 20 min exercise with multiple rests  Balance:  Static Sitting Balance  Static Sitting-Balance Support: No upper extremity supported, Feet supported  Static Sitting-Level of Assistance: Independent  Static Sitting-Comment/Number of Minutes: EOB  Bed Mobility/Transfers: Bed Mobility  Bed Mobility: Yes  Bed Mobility 1  Bed Mobility 1: Supine to sitting  Level of Assistance 1: Close supervision  Bed Mobility Comments 1: SBA - cues for positioning, HOB elevated   and Transfers  Transfer: No (Denies reporting \"that is too much for today.\")  IADL's:   Homemaking Responsibilities: No  Current License: No  Occupation: Retired  Vision: Vision - Basic Assessment  Current Vision: No visual deficits   and    Sensation:  Light Touch: No apparent deficits  Strength:  Strength Comments: B UEs WFL  Perception:  Inattention/Neglect: Appears intact  Coordination:  Movements are Fluid and Coordinated: Yes   Hand Function:  Hand Function  Gross Grasp: Functional  Coordination: Functional  Extremities: RUE   RUE : Within Functional Limits, LUE   LUE: Within Functional Limits,  , and      Outcome Measures: [facility] Daily Activity  Putting on and taking off regular lower body clothing: A lot  Bathing (including washing, rinsing, drying): A lot  Putting on and taking off regular upper body clothing: A little  Toileting, which includes using toilet, bedpan or urinal: A lot  Taking care of personal grooming such as brushing teeth: A " little  Eating Meals: None  Daily Activity - Total Score: 16         ,     OT Adult Other Outcome Measures  4AT: 4 AT -    Education Documentation  Body Mechanics, taught by Paco Munoz OT at 1/2/2025  3:39 PM.  Learner: Patient  Readiness: Acceptance  Method: Explanation, Demonstration  Response: Verbalizes Understanding    Precautions, taught by Paco Munoz OT at 1/2/2025  3:39 PM.  Learner: Patient  Readiness: Acceptance  Method: Explanation, Demonstration  Response: Verbalizes Understanding    ADL Training, taught by Paco Munoz OT at 1/2/2025  3:39 PM.  Learner: Patient  Readiness: Acceptance  Method: Explanation, Demonstration  Response: Verbalizes Understanding    Education Comments  No comments found.    Goals:     Encounter Problems       Encounter Problems (Active)       ADLs       Patient with complete lower body dressing with CGA  level of assistance donning and doffing all LE clothes  with PRN adaptive equipment while edge of bed  and standing (Progressing)       Start:  01/02/25    Expected End:  01/23/25            Patient will complete daily grooming tasks brushing teeth and washing face/hair with Min A  level of assistance and PRN adaptive equipment while standing . (Progressing)       Start:  01/02/25    Expected End:  01/23/25            Patient will complete toileting including hygiene clothing management/hygiene with minimal assist  level of assistance and raised toilet seat and grab bars. (Progressing)       Start:  01/02/25    Expected End:  01/23/25               BALANCE       Pt will maintain dynamic standing balance during ADL task with minimal assist  level of assistance in order to demonstrate decreased risk of falling and improved postural control. (Progressing)       Start:  01/02/25    Expected End:  01/23/25               TRANSFERS       Patient will complete functional transfers with least restrictive device with minimal assist  level of assistance. (Progressing)       Start:   01/02/25    Expected End:  01/23/25 01/02/25 at 3:40 PM   Paoc Munoz OT   Rehab Office: [phone]

## 2025-01-02 NOTE — PROGRESS NOTES
Daily Progress Note    Alejandra Smith is a 74 y.o. female on day 5 of admission presenting with Pancytopenia.    Subjective   NAEON.   Patient with improvement in her breathing after lasix. She also reports that her diarrhea has stopped.     10 point ROS performed and negative unless stated above.          Objective   Weight: 102 kg (225 lb) (12/29/24 1247)    Daily Weight  12/29/24 : 102 kg (225 lb)      Last Recorded Vitals  Heart Rate:  [58-63]   Temp:  [36.3 °C (97.4 °F)-36.6 °C (97.8 °F)]   Resp:  [16-18]   BP: (142-165)/(43-58)   SpO2:  [98 %-100 %]      Physical Exam  Constitutional: no acute distress  Respiratory: No wheezes, rales, or rhonchi. Normal respiratory effort.  Cardiovascular: RRR, No murmurs, gallops, or rubs  Abdominal: Soft, nontender, nondistended. Bowel sounds present  Neuro: AAOx 4, CN II-XII grossly intact  MSK: No LE edema bilaterally, moving extremities well  Skin: Warm, dry. Well perfused  Psych: Appropriate mood and affect      Intake/Output last 3 Shifts:  I/O last 3 completed shifts:  In: 1040 (10.2 mL/kg) [P.O.:1040]  Out: 2200 (21.6 mL/kg) [Urine:2200 (0.6 mL/kg/hr)]  Weight: 102.1 kg     Medications  Scheduled medications  acyclovir, 800 mg, oral, q24h KIMMY  amLODIPine, 10 mg, oral, Daily  enoxaparin, 40 mg, subcutaneous, q24h  insulin lispro, 0-5 Units, subcutaneous, TID AC  labetalol, 200 mg, oral, BID  lidocaine, 2 patch, transdermal, Daily  losartan, 25 mg, oral, Daily  melatonin, 5 mg, oral, Daily  OLANZapine, 2.5 mg, oral, Nightly  pantoprazole, 40 mg, oral, Daily before breakfast  vancomycin, 125 mg, oral, 4x daily      Continuous medications     PRN medications  PRN medications: albuterol, dextrose, dextrose, glucagon, glucagon, HYDROmorphone, HYDROmorphone, hydrOXYzine HCL, methocarbamol, ondansetron ODT       Relevant Results    Labs  Results from last 7 days   Lab Units 01/02/25  1209 01/01/25  1236 12/31/24  1135 12/30/24  1233 12/29/24  0639   SODIUM mmol/L 130*  "131* 131* 132* 136   POTASSIUM mmol/L 4.5 4.8 3.5 2.8* 3.1*   CHLORIDE mmol/L 93* 93* 88* 89* 92*   CO2 mmol/L 27 30 31 30 31   BUN mg/dL 12 11 13 10 10   CREATININE mg/dL 0.83 0.84 1.09* 0.81 0.65   CALCIUM mg/dL 9.0 9.0 8.1* 7.4* 7.3*      Results from last 7 days   Lab Units 01/02/25  1209 01/01/25  1236 12/31/24  1135 12/30/24  1233 12/29/24  0639   HEMOGLOBIN g/dL 6.8* 7.6* 7.5* 7.9* 7.8*   WBC AUTO x10*3/uL 3.5* 2.2* 1.1* 0.7* 0.6*   PLATELETS AUTO x10*3/uL 119* 89* 48* 22* 11*   HEMATOCRIT % 20.9* 23.4* 22.7* 22.4* 21.8*     Results from last 7 days   Lab Units 12/31/24  1135 12/28/24  0151   ALK PHOS U/L 122 141*   BILIRUBIN TOTAL mg/dL 0.6 1.1   BILIRUBIN DIRECT mg/dL  --  0.2   PROTEIN TOTAL g/dL 6.2* 5.3*   ALT U/L 11 13   AST U/L 8* 11      Results from last 7 days   Lab Units 12/28/24  0151   PROTIME seconds 13.2*   INR  1.2*   APTT seconds 29     No results found for: \"NONUHFIRE\", \"LACTATE\"    Results from last 7 days   Lab Units 01/02/25  1209 01/01/25  1236 12/31/24  1135 12/30/24  1233 12/29/24  0639   GLUCOSE mg/dL 240* 204* 144* 126* 124*       Imaging  Bedside Midline Imaging    Result Date: 12/28/2024  These images are not reportable by radiology and will not be interpreted by  Radiologists.    XR chest 1 view    Result Date: 12/28/2024  Interpreted By:  Luke Bearden, STUDY: XR CHEST 1 VIEW;  12/28/2024 3:07 pm   INDICATION: Signs/Symptoms:SOB.   COMPARISON: None.   ACCESSION NUMBER(S): [id number]   ORDERING CLINICIAN: ISMAEL SNYDER   FINDINGS: AP radiograph of the chest. Patient's is significantly rotated towards right, limiting evaluation. There is poorly visualized tubular structure projecting over left lung base, which may represent a chest tube.   CARDIOMEDIASTINAL SILHOUETTE: The cardiomediastinal silhouette appears enlarged, exaggerated by patient rotation.   LUNGS: There is at least moderate-sized left pleural effusion and associated atelectasis/consolidation. There is mild right " basilar atelectasis suggestion of trace/small right pleural effusion. Low lung volumes with bronchovascular crowding with suggestion of mild pulmonary edema. There is no pneumothorax.   ABDOMEN: No remarkable upper abdominal findings.   BONES: No acute osseous abnormality.       Limited examination because of significant patient rotation within this limitation: 1. At least moderate-sized left pleural effusion and associated atelectasis/consolidation. Poorly visualized tubular structure projecting over left lung base, which may represent a chest tube. Correlate clinically. 2. Mild right basilar atelectasis suggestion of trace/small right pleural effusion. 3. Suggestion of mild pulmonary edema.   Signed by: Luke Bearden 12/28/2024 3:30 PM Dictation workstation:   [id number]                Assessment/Plan   Assessment & Plan  Pancytopenia    Pleural effusion    SCLC (small cell lung carcinoma), unspecified laterality      Alejandra Smith is a 74 y.o. female w/ a PMH Stage IV small cell lung cancer s/p 2 cycles carbo/etoposide/atezolizumab with liver mets, sjogrens, SLE, T2DM, HTN, HFpEF, Afib who presented to [facility] with history of malaise, dry heave, fatigue. She was found to be pancytopenic requiring transfusion. She has tunneled L pleural catheter placed due to L pleural effusion, likely malignant, and she drains this every other day at her SNF. She was transferred to  for oncology care. 12/30 patient with positive C.Diff PCR/toxin so oral vancomycin was started.      Updates 1/2/24  - Drained 200 ml from pleurex   - 1.6 L out overnight from 40 IV lasix  - Patient back to baseline O2 requirement  - No longer with diarrhea currently on Day 3 of Vancomycin       #Stage IV SCLC w/ mets to liver   ::Cytology report at  shows positive small cell cancer  :: s/p 2 cycles carbo/etoposide/atezolizumab   :: CT scan showed decrease in size of liver mets suggesting good response to therapy  ::On 4-6L NC  :: Left  tunneled cath placed 11/8/2024, drains L pleural effusion every other day at SNF  - dietician consulted to optimize nutrition  - avoid steroids given active immunotherapy     #Pancytopenia  #Anemia  ::Likely secondary to her chemotherapy -> appears last cycle was 12/18, making now her genevieve   ::Hg 5.7 at OSH, was given 2 units PRBC -> 7.5 on admission  :: reported 1x bloody stool after LORIN at OSH, low concern for GI bleed, no indication for colonoscopy, no further bloody stools. Also want to avoid colonoscopy and LORIN with severe neutropenia  -Sent Type/Screen, Verab, consented  -blood smear ordered  -c/w daily acyclovir 800mg for VXV ppx per documentation  - transfuse for Hgb < 7 and platelets < 10 or < 20 with active bleeding  - Midline placed for IV access       #C.Diff infection   :: C.Diff PCR positive, C difficile Toxin  A/B positive   :: Patient with continued diarrhea   - Started PO Vanc 125 mg 4x daily for 10 days      #Paroxysmal Afib  -On labtalol 200mg BID  - patient refusing telemetry, will monitor on PE daily for regular vs irregular HR and encourage tele monitoring      #HFpEF  ::Last reported EF 55-60%, grade 2 diastolic dysfunction, mild LVH, mild RV enlargement at OSH  -Lasix 40mg daily  -Losartan 20mg daily     #GERD  -omeprazole 40mg daily     #T2DM  ::No A1c baseline  -30 units glargine at home, halved while inpatient to 15 units  -SSI  -A1c ordered     #YOVANA  -Rt consult for cpap settings        Medical Check List   FEN  -Fluids: Will replete PRN   -Electrolytes: Will replete PRN, with goals of Mg >2, K>4  -Nutrition: regular- ensure supplements chocolate flavored  Prophylaxis:  -DVT ppx: holding w/ thrombocytopenia   -GI ppx/Bowel care: PPI  -Abx: None- neutropenic precautions, if febrile cultures + abx   -Pain regimen: dilaudid 2mg q4 hours  Hardware:  -Drains: L tunneled pleural catheter  -Lines: PIV  Social:  -Code: Full Code  -NOK/Surrogate Decision Maker: Lidia Calix  (Sibling)  [phone] (Home Phone)      Patient assessment and plan staffed with the attending physician on service, Dr. Wang.     Zohra Acharya MD  Internal Medicine PGY-1

## 2025-01-03 LAB
ALBUMIN SERPL BCP-MCNC: 2.8 G/DL (ref 3.4–5)
ANION GAP SERPL CALC-SCNC: 13 MMOL/L (ref 10–20)
BASOPHILS # BLD AUTO: 0.02 X10*3/UL (ref 0–0.1)
BASOPHILS # BLD MANUAL: 0 X10*3/UL (ref 0–0.1)
BASOPHILS NFR BLD AUTO: 0.3 %
BASOPHILS NFR BLD MANUAL: 0 %
BLASTS # BLD MANUAL: 0.05 X10*3/UL
BLASTS NFR BLD MANUAL: 0.9 %
BLOOD EXPIRATION DATE: NORMAL
BUN SERPL-MCNC: 12 MG/DL (ref 6–23)
CALCIUM SERPL-MCNC: 9.1 MG/DL (ref 8.6–10.6)
CHLORIDE SERPL-SCNC: 94 MMOL/L (ref 98–107)
CO2 SERPL-SCNC: 29 MMOL/L (ref 21–32)
CREAT SERPL-MCNC: 0.83 MG/DL (ref 0.5–1.05)
DISPENSE STATUS: NORMAL
EGFRCR SERPLBLD CKD-EPI 2021: 74 ML/MIN/1.73M*2
EOSINOPHIL # BLD AUTO: 0.01 X10*3/UL (ref 0–0.4)
EOSINOPHIL # BLD MANUAL: 0.05 X10*3/UL (ref 0–0.4)
EOSINOPHIL NFR BLD AUTO: 0.1 %
EOSINOPHIL NFR BLD MANUAL: 0.9 %
ERYTHROCYTE [DISTWIDTH] IN BLOOD BY AUTOMATED COUNT: 15.4 % (ref 11.5–14.5)
ERYTHROCYTE [DISTWIDTH] IN BLOOD BY AUTOMATED COUNT: 15.9 % (ref 11.5–14.5)
GLUCOSE SERPL-MCNC: 154 MG/DL (ref 74–99)
HCT VFR BLD AUTO: 20.6 % (ref 36–46)
HCT VFR BLD AUTO: 23.5 % (ref 36–46)
HGB BLD-MCNC: 6.6 G/DL (ref 12–16)
HGB BLD-MCNC: 7.8 G/DL (ref 12–16)
IMM GRANULOCYTES # BLD AUTO: 0.27 X10*3/UL (ref 0–0.5)
IMM GRANULOCYTES # BLD AUTO: 0.36 X10*3/UL (ref 0–0.5)
IMM GRANULOCYTES NFR BLD AUTO: 5 % (ref 0–0.9)
IMM GRANULOCYTES NFR BLD AUTO: 5.2 % (ref 0–0.9)
LYMPHOCYTES # BLD AUTO: 1.06 X10*3/UL (ref 0.8–3)
LYMPHOCYTES # BLD MANUAL: 0.67 X10*3/UL (ref 0.8–3)
LYMPHOCYTES NFR BLD AUTO: 14.6 %
LYMPHOCYTES NFR BLD MANUAL: 12.8 %
MAGNESIUM SERPL-MCNC: 1.25 MG/DL (ref 1.6–2.4)
MAGNESIUM SERPL-MCNC: 1.55 MG/DL (ref 1.6–2.4)
MCH RBC QN AUTO: 25.4 PG (ref 26–34)
MCH RBC QN AUTO: 26.4 PG (ref 26–34)
MCHC RBC AUTO-ENTMCNC: 32 G/DL (ref 32–36)
MCHC RBC AUTO-ENTMCNC: 33.2 G/DL (ref 32–36)
MCV RBC AUTO: 79 FL (ref 80–100)
MCV RBC AUTO: 79 FL (ref 80–100)
MONOCYTES # BLD AUTO: 0.63 X10*3/UL (ref 0.05–0.8)
MONOCYTES # BLD MANUAL: 0.27 X10*3/UL (ref 0.05–0.8)
MONOCYTES NFR BLD AUTO: 8.7 %
MONOCYTES NFR BLD MANUAL: 5.1 %
NEUTROPHILS # BLD AUTO: 5.17 X10*3/UL (ref 1.6–5.5)
NEUTROPHILS # BLD MANUAL: 3.96 X10*3/UL (ref 1.6–5.5)
NEUTROPHILS NFR BLD AUTO: 71.3 %
NEUTS BAND # BLD MANUAL: 0.27 X10*3/UL (ref 0–0.5)
NEUTS BAND NFR BLD MANUAL: 5.1 %
NEUTS SEG # BLD MANUAL: 3.69 X10*3/UL (ref 1.6–5)
NEUTS SEG NFR BLD MANUAL: 70.9 %
NRBC BLD-RTO: 0 /100 WBCS (ref 0–0)
NRBC BLD-RTO: 0 /100 WBCS (ref 0–0)
PHOSPHATE SERPL-MCNC: 3.3 MG/DL (ref 2.5–4.9)
PLATELET # BLD AUTO: 139 X10*3/UL (ref 150–450)
PLATELET # BLD AUTO: 181 X10*3/UL (ref 150–450)
POTASSIUM SERPL-SCNC: 4.6 MMOL/L (ref 3.5–5.3)
PRODUCT BLOOD TYPE: 6200
PRODUCT CODE: NORMAL
PROMYELOCYTES # BLD MANUAL: 0.09 X10*3/UL
PROMYELOCYTES NFR BLD MANUAL: 1.7 %
RBC # BLD AUTO: 2.6 X10*6/UL (ref 4–5.2)
RBC # BLD AUTO: 2.96 X10*6/UL (ref 4–5.2)
RBC MORPH BLD: ABNORMAL
SODIUM SERPL-SCNC: 131 MMOL/L (ref 136–145)
TOTAL CELLS COUNTED BLD: 117
UNIT ABO: NORMAL
UNIT NUMBER: NORMAL
UNIT RH: NORMAL
UNIT VOLUME: 350
VARIANT LYMPHS # BLD MANUAL: 0.14 X10*3/UL (ref 0–0.3)
VARIANT LYMPHS NFR BLD: 2.6 %
WBC # BLD AUTO: 5.2 X10*3/UL (ref 4.4–11.3)
WBC # BLD AUTO: 7.3 X10*3/UL (ref 4.4–11.3)
XM INTEP: NORMAL

## 2025-01-03 PROCEDURE — 99233 SBSQ HOSP IP/OBS HIGH 50: CPT

## 2025-01-03 PROCEDURE — 36430 TRANSFUSION BLD/BLD COMPNT: CPT

## 2025-01-03 PROCEDURE — 85025 COMPLETE CBC W/AUTO DIFF WBC: CPT

## 2025-01-03 PROCEDURE — 99222 1ST HOSP IP/OBS MODERATE 55: CPT | Performed by: STUDENT IN AN ORGANIZED HEALTH CARE EDUCATION/TRAINING PROGRAM

## 2025-01-03 PROCEDURE — 1170000001 HC PRIVATE ONCOLOGY ROOM DAILY

## 2025-01-03 PROCEDURE — 2500000002 HC RX 250 W HCPCS SELF ADMINISTERED DRUGS (ALT 637 FOR MEDICARE OP, ALT 636 FOR OP/ED)

## 2025-01-03 PROCEDURE — 2500000004 HC RX 250 GENERAL PHARMACY W/ HCPCS (ALT 636 FOR OP/ED)

## 2025-01-03 PROCEDURE — P9040 RBC LEUKOREDUCED IRRADIATED: HCPCS

## 2025-01-03 PROCEDURE — 85027 COMPLETE CBC AUTOMATED: CPT

## 2025-01-03 PROCEDURE — 2500000001 HC RX 250 WO HCPCS SELF ADMINISTERED DRUGS (ALT 637 FOR MEDICARE OP)

## 2025-01-03 PROCEDURE — 83735 ASSAY OF MAGNESIUM: CPT

## 2025-01-03 PROCEDURE — 80069 RENAL FUNCTION PANEL: CPT

## 2025-01-03 PROCEDURE — 85007 BL SMEAR W/DIFF WBC COUNT: CPT

## 2025-01-03 RX ORDER — MAGNESIUM SULFATE HEPTAHYDRATE 40 MG/ML
2 INJECTION, SOLUTION INTRAVENOUS ONCE
Status: COMPLETED | OUTPATIENT
Start: 2025-01-03 | End: 2025-01-04

## 2025-01-03 RX ADMIN — VANCOMYCIN HYDROCHLORIDE 125 MG: 125 CAPSULE ORAL at 21:07

## 2025-01-03 RX ADMIN — VANCOMYCIN HYDROCHLORIDE 125 MG: 125 CAPSULE ORAL at 09:36

## 2025-01-03 RX ADMIN — VANCOMYCIN HYDROCHLORIDE 125 MG: 125 CAPSULE ORAL at 16:58

## 2025-01-03 RX ADMIN — AMLODIPINE BESYLATE 10 MG: 10 TABLET ORAL at 09:36

## 2025-01-03 RX ADMIN — HYDROMORPHONE HYDROCHLORIDE 2 MG: 2 TABLET ORAL at 11:16

## 2025-01-03 RX ADMIN — LOSARTAN POTASSIUM 25 MG: 25 TABLET, FILM COATED ORAL at 09:36

## 2025-01-03 RX ADMIN — HYDROMORPHONE HYDROCHLORIDE 2 MG: 2 TABLET ORAL at 17:06

## 2025-01-03 RX ADMIN — INSULIN LISPRO 2 UNITS: 100 INJECTION, SOLUTION INTRAVENOUS; SUBCUTANEOUS at 09:37

## 2025-01-03 RX ADMIN — INSULIN LISPRO 4 UNITS: 100 INJECTION, SOLUTION INTRAVENOUS; SUBCUTANEOUS at 11:16

## 2025-01-03 RX ADMIN — ACYCLOVIR 800 MG: 400 TABLET ORAL at 09:36

## 2025-01-03 RX ADMIN — LABETALOL HYDROCHLORIDE 200 MG: 200 TABLET, FILM COATED ORAL at 09:37

## 2025-01-03 RX ADMIN — PANTOPRAZOLE SODIUM 40 MG: 40 TABLET, DELAYED RELEASE ORAL at 09:37

## 2025-01-03 RX ADMIN — HYDROMORPHONE HYDROCHLORIDE 2 MG: 2 TABLET ORAL at 21:06

## 2025-01-03 RX ADMIN — LABETALOL HYDROCHLORIDE 200 MG: 200 TABLET, FILM COATED ORAL at 21:07

## 2025-01-03 RX ADMIN — Medication 5 MG: at 21:06

## 2025-01-03 RX ADMIN — VANCOMYCIN HYDROCHLORIDE 125 MG: 125 CAPSULE ORAL at 12:21

## 2025-01-03 ASSESSMENT — PAIN - FUNCTIONAL ASSESSMENT
PAIN_FUNCTIONAL_ASSESSMENT: 0-10

## 2025-01-03 ASSESSMENT — PAIN SCALES - GENERAL
PAINLEVEL_OUTOF10: 3
PAINLEVEL_OUTOF10: 0 - NO PAIN
PAINLEVEL_OUTOF10: 3
PAINLEVEL_OUTOF10: 7
PAINLEVEL_OUTOF10: 7
PAINLEVEL_OUTOF10: 0 - NO PAIN
PAINLEVEL_OUTOF10: 8

## 2025-01-03 ASSESSMENT — COGNITIVE AND FUNCTIONAL STATUS - GENERAL
TOILETING: A LOT
DRESSING REGULAR LOWER BODY CLOTHING: A LOT
HELP NEEDED FOR BATHING: A LOT
DRESSING REGULAR UPPER BODY CLOTHING: A LOT
MOVING TO AND FROM BED TO CHAIR: A LITTLE
DAILY ACTIVITIY SCORE: 15
MOBILITY SCORE: 18
MOVING FROM LYING ON BACK TO SITTING ON SIDE OF FLAT BED WITH BEDRAILS: A LITTLE
PERSONAL GROOMING: A LITTLE
WALKING IN HOSPITAL ROOM: A LITTLE
CLIMB 3 TO 5 STEPS WITH RAILING: A LITTLE
TURNING FROM BACK TO SIDE WHILE IN FLAT BAD: A LITTLE
STANDING UP FROM CHAIR USING ARMS: A LITTLE

## 2025-01-03 ASSESSMENT — PAIN DESCRIPTION - LOCATION: LOCATION: GENERALIZED

## 2025-01-03 NOTE — CONSULTS
Reason For Consult  Possible GI bleed     History Of Present Illness  Alejandra Smith is a 74 y.o. female with past medical history notable for small cell lung cancer with multiple liver metastasis s/p 2 cycles of carboplatin/etoposide/atezolizumab, Sjogren's syndrome, SLE, HFpEF, Afib, HTN, and T2DM, presenting with progressive dyspnea, fatigue, and pain. Found to have acute on chronic anemia with LORIN demonstrating blood.    GI has been consulted for possible GI bleed given blood on LORIN and steadily downtrending hemoglobin levels since admission.    Patient was transferred from [facility] [place] where they found an acute drop in hemoglobin from 7.2 to 5.7 and reported blood on LORIN x1. No signs of bleeding otherwise. Hemoglobin stable after 2 units pRBC, and she was transferred to [facility] for further management. Since admission, her hemoglobin has been stable but downtrending. Denies further hematochezia, melena, hematemesis, nausea, vomiting, or rectal pain. She denies prior episodes of GI bleeding or any known gastrointestinal disease. She endorses new onset, intermittent, sharp abdominal pain in RLQ starting this AM, controlled with Dilaudid.     In terms of pertinent history, she states she had a colonoscopy 3 years ago which showed diverticulosis but otherwise no abnormalities. She has had multiple EGDs for Burgos's esophagus which was treated with argon beam coagulation, with most recent in 2022 showing few fundic gland polyps but otherwise no abnormalities. She has had multiple laparoscopic abdominal procedures for endometriosis and cholecystectomy. She quit smoking 1.5 years ago and does not drink alcohol.      Past Medical History  She has no past medical history on file.    Surgical History  She has no past surgical history on file.     Social History  She reports that she quit smoking about 18 months ago. Her smoking use included cigarettes. She has never used smokeless tobacco. No history on  "file for alcohol use and drug use.    Family History  No family history on file.     Allergies  Keflex [cephalexin], Metformin, Chantix [varenicline], Cymbalta [duloxetine], Hydralazine, Levofloxacin, and Sulfasalazine    Review of Systems  See HPI for pertinent ROS.     Physical Exam  General: well-nourished, in visible discomfort  HEENT: EOM intact, no scleral icterus, moist MM  Respiratory: nonlabored breathing on room air  Cardiovascular: Regular rate, irregular rhythm  Abdomen: Soft, tender to light palpation of RLQ, nondistended, bowel sounds present, no masses palpated, no organomegaly  Extremities: no edema, no asterixis  Neuro: alert and oriented, CNII-XII grossly intact, moves all 4 extremities with no focal deficits     Last Recorded Vitals  Blood pressure 157/63, pulse 58, temperature 36.4 °C (97.5 °F), temperature source Temporal, resp. rate 16, height 1.702 m (5' 7\"), weight 102 kg (225 lb), SpO2 96%.    Relevant Results    Lab Results   Component Value Date    HGB 6.6 (L) 01/03/2025    HGB 6.8 (L) 01/02/2025    HGB 7.6 (L) 01/01/2025    HGB 7.5 (L) 12/31/2024    HGB 7.9 (L) 12/30/2024    HGB 7.8 (L) 12/29/2024     Patient reports colonoscopy done 3 years ago found diverticulosis, otherwise normal. Unfortunately we are unable to find this report in the EMR.     Assessment/Plan     Alejandra Smith is a 74 y.o. female with past medical history notable for small cell lung cancer with multiple liver metastasis s/p 2 cycles of carboplatin/etoposide/atezolizumab, Sjogren's syndrome, SLE, HFpEF, Afib, HTN, and T2DM, presenting with progressive dyspnea, fatigue, and pain. Found to have acute on chronic anemia with LORIN demonstrating blood.    GI has been consulted for possible GI bleed given blood on LORIN and steadily downtrending hemoglobin levels since admission.    At this time, patient has had one reported episode of blood from LORIN at OSH. She has not had further evidence of overt GI bleeding and her " hemoglobin levels have been slowly downtrending but stable since admission. In the setting of her acute illness state, C. Diff infection, and pancytopenia, it is unlikely that her hemoglobin levels are downtrending from a GI bleed, and the etiology is most likely multifactorial. No indication for endoscopic evaluation at this time.    Recommendations:   - place 2 large bore IVs  - type and screen  - transfuse for H/H < 7/21 or if patient becomes symptomatic  - C. Diff treatment per primary team    The above recommendations were communicated with the [facility] team.    The patient was seen and discussed with service attending, Dr. Aguilar.    Thank you for this consult. Gastroenterology will SIGN OFF at this time.  -During weekday hours of 7am-5pm please do not hesitate to contact me on [employer] Chat or page [phone] if there are any further questions between the weekday hours of 7 AM - 5 PM.   -After hours, on weekends, and on holidays, please page the [place] [phone]. Thank you.    YULIA CHATMAN, MS4  [facility]

## 2025-01-03 NOTE — CARE PLAN
The patient's goals for the shift include      The clinical goals for the shift include pt evan remain fHDS    Problem: Pain  Goal: Takes deep breaths with improved pain control throughout the shift  Outcome: Progressing  Goal: Turns in bed with improved pain control throughout the shift  Outcome: Progressing  Goal: Walks with improved pain control throughout the shift  Outcome: Progressing  Goal: Performs ADL's with improved pain control throughout shift  Outcome: Progressing  Goal: Participates in PT with improved pain control throughout the shift  Outcome: Progressing  Goal: Free from opioid side effects throughout the shift  Outcome: Progressing  Goal: Free from acute confusion related to pain meds throughout the shift  Outcome: Progressing

## 2025-01-03 NOTE — CARE PLAN
Problem: Pain  Goal: Takes deep breaths with improved pain control throughout the shift  Outcome: Progressing  Goal: Turns in bed with improved pain control throughout the shift  Outcome: Progressing  Goal: Walks with improved pain control throughout the shift  Outcome: Progressing  Goal: Performs ADL's with improved pain control throughout shift  Outcome: Progressing  Goal: Participates in PT with improved pain control throughout the shift  Outcome: Progressing  Goal: Free from opioid side effects throughout the shift  Outcome: Progressing  Goal: Free from acute confusion related to pain meds throughout the shift  Outcome: Progressing     Problem: Safety - Adult  Goal: Free from fall injury  Outcome: Progressing     Problem: Discharge Planning  Goal: Discharge to home or other facility with appropriate resources  Outcome: Progressing     Problem: Chronic Conditions and Co-morbidities  Goal: Patient's chronic conditions and co-morbidity symptoms are monitored and maintained or improved  Outcome: Progressing     Problem: Respiratory  Goal: Minimize anxiety/maximize coping throughout shift  Outcome: Progressing  Goal: Minimal/no exertional discomfort or dyspnea this shift  Outcome: Progressing  Goal: No signs of respiratory distress (eg. Use of accessory muscles. Peds grunting)  Outcome: Progressing  Goal: Patent airway maintained this shift  Outcome: Progressing  Goal: Verbalize decreased shortness of breath this shift  Outcome: Progressing     Problem: Skin  Goal: Decreased wound size/increased tissue granulation at next dressing change  Outcome: Progressing  Goal: Participates in plan/prevention/treatment measures  Outcome: Progressing  Goal: Prevent/manage excess moisture  Outcome: Progressing  Goal: Prevent/minimize sheer/friction injuries  Outcome: Progressing  Goal: Promote/optimize nutrition  Outcome: Progressing  Goal: Promote skin healing  Outcome: Progressing

## 2025-01-03 NOTE — PROGRESS NOTES
Daily Progress Note    Alejandra Smith is a 74 y.o. female on day 6 of admission presenting with Pancytopenia.    Subjective   Patient with Hgb 6.8 ON and refused blood transfusion.       Patient still without any spells of diarrhea. She states that her breathing is still improved. She is currently receiving blood transfusion. Denies chest pain SOB, nausea and vomiting.       10 point ROS performed and negative unless stated above.          Objective   Weight: 102 kg (225 lb) (12/29/24 1247)    Daily Weight  12/29/24 : 102 kg (225 lb)      Last Recorded Vitals  Heart Rate:  [55-64]   Temp:  [36.1 °C (97 °F)-36.6 °C (97.9 °F)]   Resp:  [16-18]   BP: (132-168)/(55-63)   SpO2:  [95 %-100 %]      Physical Exam  Constitutional: no acute distress  Respiratory: No wheezes, rales, or rhonchi. Normal respiratory effort.  Cardiovascular: RRR, No murmurs, gallops, or rubs  Abdominal: Soft, nontender, nondistended. Bowel sounds present  Neuro: AAOx 4, CN II-XII grossly intact  MSK: No LE edema bilaterally, moving extremities well  Skin: Warm, dry. Well perfused  Psych: Appropriate mood and affect      Intake/Output last 3 Shifts:  I/O last 3 completed shifts:  In: 800 (7.8 mL/kg) [P.O.:800]  Out: 850 (8.3 mL/kg) [Urine:650 (0.2 mL/kg/hr); Chest Tube:200]  Weight: 102.1 kg     Medications  Scheduled medications  acyclovir, 800 mg, oral, q24h KIMMY  amLODIPine, 10 mg, oral, Daily  enoxaparin, 40 mg, subcutaneous, q24h  insulin lispro, 0-5 Units, subcutaneous, TID AC  labetalol, 200 mg, oral, BID  lidocaine, 2 patch, transdermal, Daily  losartan, 25 mg, oral, Daily  melatonin, 5 mg, oral, Daily  OLANZapine, 2.5 mg, oral, Nightly  pantoprazole, 40 mg, oral, Daily before breakfast  vancomycin, 125 mg, oral, 4x daily      Continuous medications     PRN medications  PRN medications: albuterol, dextrose, dextrose, glucagon, glucagon, HYDROmorphone, HYDROmorphone, hydrOXYzine HCL, methocarbamol, ondansetron ODT       Relevant  "Results    Labs  Results from last 7 days   Lab Units 01/03/25  0556 01/02/25  1209 01/01/25  1236 12/31/24  1135 12/30/24  1233   SODIUM mmol/L 131* 130* 131* 131* 132*   POTASSIUM mmol/L 4.6 4.5 4.8 3.5 2.8*   CHLORIDE mmol/L 94* 93* 93* 88* 89*   CO2 mmol/L 29 27 30 31 30   BUN mg/dL 12 12 11 13 10   CREATININE mg/dL 0.83 0.83 0.84 1.09* 0.81   CALCIUM mg/dL 9.1 9.0 9.0 8.1* 7.4*      Results from last 7 days   Lab Units 01/03/25  0555 01/02/25  1209 01/01/25  1236 12/31/24  1135 12/30/24  1233   HEMOGLOBIN g/dL 6.6* 6.8* 7.6* 7.5* 7.9*   WBC AUTO x10*3/uL 5.2 3.5* 2.2* 1.1* 0.7*   PLATELETS AUTO x10*3/uL 139* 119* 89* 48* 22*   HEMATOCRIT % 20.6* 20.9* 23.4* 22.7* 22.4*     Results from last 7 days   Lab Units 12/31/24  1135 12/28/24  0151   ALK PHOS U/L 122 141*   BILIRUBIN TOTAL mg/dL 0.6 1.1   BILIRUBIN DIRECT mg/dL  --  0.2   PROTEIN TOTAL g/dL 6.2* 5.3*   ALT U/L 11 13   AST U/L 8* 11      Results from last 7 days   Lab Units 12/28/24  0151   PROTIME seconds 13.2*   INR  1.2*   APTT seconds 29     No results found for: \"NONUHFIRE\", \"LACTATE\"    Results from last 7 days   Lab Units 01/03/25  0556 01/02/25  1209 01/01/25  1236 12/31/24  1135 12/30/24  1233   GLUCOSE mg/dL 154* 240* 204* 144* 126*       Imaging  Bedside Midline Imaging    Result Date: 12/28/2024  These images are not reportable by radiology and will not be interpreted by  Radiologists.    XR chest 1 view    Result Date: 12/28/2024  Interpreted By:  Luke Bearden, STUDY: XR CHEST 1 VIEW;  12/28/2024 3:07 pm   INDICATION: Signs/Symptoms:SOB.   COMPARISON: None.   ACCESSION NUMBER(S): [id number]   ORDERING CLINICIAN: ISMAEL SNYDER   FINDINGS: AP radiograph of the chest. Patient's is significantly rotated towards right, limiting evaluation. There is poorly visualized tubular structure projecting over left lung base, which may represent a chest tube.   CARDIOMEDIASTINAL SILHOUETTE: The cardiomediastinal silhouette appears enlarged, exaggerated by " patient rotation.   LUNGS: There is at least moderate-sized left pleural effusion and associated atelectasis/consolidation. There is mild right basilar atelectasis suggestion of trace/small right pleural effusion. Low lung volumes with bronchovascular crowding with suggestion of mild pulmonary edema. There is no pneumothorax.   ABDOMEN: No remarkable upper abdominal findings.   BONES: No acute osseous abnormality.       Limited examination because of significant patient rotation within this limitation: 1. At least moderate-sized left pleural effusion and associated atelectasis/consolidation. Poorly visualized tubular structure projecting over left lung base, which may represent a chest tube. Correlate clinically. 2. Mild right basilar atelectasis suggestion of trace/small right pleural effusion. 3. Suggestion of mild pulmonary edema.   Signed by: Luke Bearden 12/28/2024 3:30 PM Dictation workstation:   [id number]                Assessment/Plan   Assessment & Plan  Pancytopenia    Pleural effusion    SCLC (small cell lung carcinoma), unspecified laterality      Alejandra Smith is a 74 y.o. female w/ a PMH Stage IV small cell lung cancer s/p 2 cycles carbo/etoposide/atezolizumab with liver mets, sjogrens, SLE, T2DM, HTN, HFpEF, Afib who presented to [facility] with history of malaise, dry heave, fatigue. She was found to be pancytopenic requiring transfusion. She has tunneled L pleural catheter placed due to L pleural effusion, likely malignant, and she drains this every other day at her SNF. She was transferred to  for oncology care. 12/30 patient with positive C.Diff PCR/toxin so oral vancomycin was started.      Updates 1/3/24  - Patient back to baseline O2 requirement  - No longer with diarrhea currently on Day 4 of Vancomycin   - Receiving blood transfusion for Hgb 6.6  - GI consulted out of concern for GI bleed given recent diarrhea from C.Diff without other source.       #Stage IV SCLC w/ mets to  liver   ::Cytology report at  shows positive small cell cancer  :: s/p 2 cycles carbo/etoposide/atezolizumab   :: CT scan showed decrease in size of liver mets suggesting good response to therapy  ::On 4-6L NC  :: Left tunneled cath placed 11/8/2024, drains L pleural effusion every other day at [facility]  - dietician consulted to optimize nutrition  - avoid steroids given active immunotherapy     #Pancytopenia  #Anemia  ::Likely secondary to her chemotherapy -> appears last cycle was 12/18, making now her genevieve   ::Hg 5.7 at OSH, was given 2 units PRBC -> 7.5 on admission  :: reported 1x bloody stool  -Sent Type/Screen, Verab, consented  -blood smear ordered  -c/w daily acyclovir 800mg for VXV ppx per documentation  - transfuse for Hgb < 7 and platelets < 10 or < 20 with active bleeding  - Midline placed for IV access  - GI consulted out of concern for GI bleed awaiting recs        #C.Diff infection improving   :: C.Diff PCR positive, C difficile Toxin  A/B positive   :: Patient with continued diarrhea   - Started PO Vanc 125 mg 4x daily for 10 days      #Paroxysmal Afib  -On labtalol 200mg BID  - patient refusing telemetry, will monitor on PE daily for regular vs irregular HR and encourage tele monitoring      #HFpEF  ::Last reported EF 55-60%, grade 2 diastolic dysfunction, mild LVH, mild RV enlargement at OSH  -Lasix 40mg daily  -Losartan 20mg daily     #GERD  -omeprazole 40mg daily     #T2DM  ::No A1c baseline  -30 units glargine at home, halved while inpatient to 15 units  -SSI  -A1c ordered     #YOVANA  -Rt consult for cpap settings        Medical Check List   FEN  -Fluids: Will replete PRN   -Electrolytes: Will replete PRN, with goals of Mg >2, K>4  -Nutrition: regular- ensure supplements chocolate flavored  Prophylaxis:  -DVT ppx: holding w/ thrombocytopenia   -GI ppx/Bowel care: PPI  -Abx: None- neutropenic precautions, if febrile cultures + abx   -Pain regimen: dilaudid 2mg q4 hours  Hardware:  -Drains: L  tunneled pleural catheter  -Lines: PIV  Social:  -Code: Full Code  -NOK/Surrogate Decision Maker: Lidia Calix (Sibling)  [phone] (Home Phone)      Patient assessment and plan staffed with the attending physician on service, Dr. Wang.     Zohra Acharya MD  Internal Medicine PGY-1

## 2025-01-04 VITALS
BODY MASS INDEX: 35.31 KG/M2 | HEART RATE: 60 BPM | OXYGEN SATURATION: 98 % | SYSTOLIC BLOOD PRESSURE: 163 MMHG | WEIGHT: 225 LBS | DIASTOLIC BLOOD PRESSURE: 60 MMHG | HEIGHT: 67 IN | TEMPERATURE: 97.3 F | RESPIRATION RATE: 16 BRPM

## 2025-01-04 LAB
ABO GROUP (TYPE) IN BLOOD: NORMAL
ALBUMIN SERPL BCP-MCNC: 2.7 G/DL (ref 3.4–5)
ANION GAP SERPL CALC-SCNC: 12 MMOL/L (ref 10–20)
ANTIBODY SCREEN: NORMAL
BASOPHILS # BLD MANUAL: 0 X10*3/UL (ref 0–0.1)
BASOPHILS NFR BLD MANUAL: 0 %
BUN SERPL-MCNC: 12 MG/DL (ref 6–23)
CALCIUM SERPL-MCNC: 9.2 MG/DL (ref 8.6–10.6)
CHLORIDE SERPL-SCNC: 94 MMOL/L (ref 98–107)
CO2 SERPL-SCNC: 30 MMOL/L (ref 21–32)
CREAT SERPL-MCNC: 0.84 MG/DL (ref 0.5–1.05)
EGFRCR SERPLBLD CKD-EPI 2021: 73 ML/MIN/1.73M*2
EOSINOPHIL # BLD MANUAL: 0 X10*3/UL (ref 0–0.4)
EOSINOPHIL NFR BLD MANUAL: 0 %
ERYTHROCYTE [DISTWIDTH] IN BLOOD BY AUTOMATED COUNT: 15.9 % (ref 11.5–14.5)
GLUCOSE SERPL-MCNC: 151 MG/DL (ref 74–99)
HCT VFR BLD AUTO: 23.4 % (ref 36–46)
HGB BLD-MCNC: 7.6 G/DL (ref 12–16)
IMM GRANULOCYTES # BLD AUTO: 0.44 X10*3/UL (ref 0–0.5)
IMM GRANULOCYTES NFR BLD AUTO: 5.8 % (ref 0–0.9)
LYMPHOCYTES # BLD MANUAL: 0.72 X10*3/UL (ref 0.8–3)
LYMPHOCYTES NFR BLD MANUAL: 9.5 %
MAGNESIUM SERPL-MCNC: 1.25 MG/DL (ref 1.6–2.4)
MCH RBC QN AUTO: 26.1 PG (ref 26–34)
MCHC RBC AUTO-ENTMCNC: 32.5 G/DL (ref 32–36)
MCV RBC AUTO: 80 FL (ref 80–100)
METAMYELOCYTES # BLD MANUAL: 0.07 X10*3/UL
METAMYELOCYTES NFR BLD MANUAL: 0.9 %
MONOCYTES # BLD MANUAL: 0.52 X10*3/UL (ref 0.05–0.8)
MONOCYTES NFR BLD MANUAL: 6.9 %
MYELOCYTES # BLD MANUAL: 0.13 X10*3/UL
MYELOCYTES NFR BLD MANUAL: 1.7 %
NEUTROPHILS # BLD MANUAL: 6.02 X10*3/UL (ref 1.6–5.5)
NEUTS BAND # BLD MANUAL: 0.65 X10*3/UL (ref 0–0.5)
NEUTS BAND NFR BLD MANUAL: 8.6 %
NEUTS SEG # BLD MANUAL: 5.37 X10*3/UL (ref 1.6–5)
NEUTS SEG NFR BLD MANUAL: 70.7 %
NRBC BLD-RTO: 0 /100 WBCS (ref 0–0)
OVALOCYTES BLD QL SMEAR: ABNORMAL
PHOSPHATE SERPL-MCNC: 3.4 MG/DL (ref 2.5–4.9)
PLATELET # BLD AUTO: 174 X10*3/UL (ref 150–450)
POTASSIUM SERPL-SCNC: 4.7 MMOL/L (ref 3.5–5.3)
RBC # BLD AUTO: 2.91 X10*6/UL (ref 4–5.2)
RBC MORPH BLD: ABNORMAL
RH FACTOR (ANTIGEN D): NORMAL
SODIUM SERPL-SCNC: 131 MMOL/L (ref 136–145)
TOTAL CELLS COUNTED BLD: 116
VARIANT LYMPHS # BLD MANUAL: 0.13 X10*3/UL (ref 0–0.3)
VARIANT LYMPHS NFR BLD: 1.7 %
WBC # BLD AUTO: 7.6 X10*3/UL (ref 4.4–11.3)

## 2025-01-04 PROCEDURE — 2500000004 HC RX 250 GENERAL PHARMACY W/ HCPCS (ALT 636 FOR OP/ED)

## 2025-01-04 PROCEDURE — 2500000001 HC RX 250 WO HCPCS SELF ADMINISTERED DRUGS (ALT 637 FOR MEDICARE OP)

## 2025-01-04 PROCEDURE — 2500000002 HC RX 250 W HCPCS SELF ADMINISTERED DRUGS (ALT 637 FOR MEDICARE OP, ALT 636 FOR OP/ED)

## 2025-01-04 PROCEDURE — 99233 SBSQ HOSP IP/OBS HIGH 50: CPT

## 2025-01-04 PROCEDURE — 80069 RENAL FUNCTION PANEL: CPT

## 2025-01-04 PROCEDURE — 83735 ASSAY OF MAGNESIUM: CPT

## 2025-01-04 PROCEDURE — 85007 BL SMEAR W/DIFF WBC COUNT: CPT

## 2025-01-04 PROCEDURE — 85027 COMPLETE CBC AUTOMATED: CPT

## 2025-01-04 PROCEDURE — 86901 BLOOD TYPING SEROLOGIC RH(D): CPT

## 2025-01-04 RX ORDER — VANCOMYCIN HYDROCHLORIDE 125 MG/1
125 CAPSULE ORAL 4 TIMES DAILY
Start: 2025-01-04 | End: 2025-01-11

## 2025-01-04 RX ORDER — ALBUTEROL SULFATE 90 UG/1
2 INHALANT RESPIRATORY (INHALATION) EVERY 4 HOURS PRN
Start: 2025-01-04

## 2025-01-04 RX ORDER — ACETAMINOPHEN 500 MG
2000 TABLET ORAL EVERY 24 HOURS
Start: 2025-01-04

## 2025-01-04 RX ORDER — HYDROMORPHONE HYDROCHLORIDE 2 MG/1
2 TABLET ORAL
Start: 2025-01-04

## 2025-01-04 RX ORDER — MAGNESIUM SULFATE HEPTAHYDRATE 40 MG/ML
4 INJECTION, SOLUTION INTRAVENOUS ONCE
Status: COMPLETED | OUTPATIENT
Start: 2025-01-04 | End: 2025-01-04

## 2025-01-04 RX ORDER — LOSARTAN POTASSIUM 25 MG/1
25 TABLET ORAL DAILY
Start: 2025-01-04

## 2025-01-04 RX ORDER — AMLODIPINE BESYLATE 10 MG/1
10 TABLET ORAL DAILY
Start: 2025-01-04

## 2025-01-04 RX ORDER — FUROSEMIDE 20 MG/1
20 TABLET ORAL DAILY PRN
Start: 2025-01-04

## 2025-01-04 RX ORDER — ACYCLOVIR 800 MG/1
800 TABLET ORAL DAILY
Start: 2025-01-04

## 2025-01-04 RX ORDER — INSULIN LISPRO 100 [IU]/ML
0-5 INJECTION, SOLUTION INTRAVENOUS; SUBCUTANEOUS
Start: 2025-01-04

## 2025-01-04 RX ORDER — CALCIUM CARBONATE 300MG(750)
400 TABLET,CHEWABLE ORAL DAILY
Start: 2025-01-04

## 2025-01-04 RX ORDER — INSULIN GLARGINE 100 [IU]/ML
30 INJECTION, SOLUTION SUBCUTANEOUS NIGHTLY
Start: 2025-01-04

## 2025-01-04 RX ORDER — ASPIRIN 81 MG/1
81 TABLET ORAL DAILY
Start: 2025-01-04

## 2025-01-04 RX ORDER — BACLOFEN 5 MG/1
5 TABLET ORAL EVERY 8 HOURS PRN
Start: 2025-01-04

## 2025-01-04 RX ORDER — VITS A,C,E/LUTEIN/MINERALS 300MCG-200
1 TABLET ORAL DAILY
Start: 2025-01-04

## 2025-01-04 RX ORDER — LABETALOL 200 MG/1
1 TABLET, FILM COATED ORAL
Start: 2025-01-04

## 2025-01-04 RX ORDER — OMEPRAZOLE 40 MG/1
40 CAPSULE, DELAYED RELEASE ORAL
Start: 2025-01-04

## 2025-01-04 RX ORDER — ONDANSETRON 4 MG/1
4 TABLET, FILM COATED ORAL EVERY 6 HOURS PRN
Start: 2025-01-04

## 2025-01-04 RX ORDER — CALCIUM CARBONATE/VITAMIN D3 600MG-5MCG
1 TABLET ORAL 2 TIMES DAILY
Start: 2025-01-04

## 2025-01-04 RX ADMIN — AMLODIPINE BESYLATE 10 MG: 10 TABLET ORAL at 09:01

## 2025-01-04 RX ADMIN — PANTOPRAZOLE SODIUM 40 MG: 40 TABLET, DELAYED RELEASE ORAL at 06:28

## 2025-01-04 RX ADMIN — INSULIN LISPRO 3 UNITS: 100 INJECTION, SOLUTION INTRAVENOUS; SUBCUTANEOUS at 11:22

## 2025-01-04 RX ADMIN — LABETALOL HYDROCHLORIDE 200 MG: 200 TABLET, FILM COATED ORAL at 09:01

## 2025-01-04 RX ADMIN — VANCOMYCIN HYDROCHLORIDE 125 MG: 125 CAPSULE ORAL at 13:05

## 2025-01-04 RX ADMIN — VANCOMYCIN HYDROCHLORIDE 125 MG: 125 CAPSULE ORAL at 06:28

## 2025-01-04 RX ADMIN — INSULIN LISPRO 1 UNITS: 100 INJECTION, SOLUTION INTRAVENOUS; SUBCUTANEOUS at 09:01

## 2025-01-04 RX ADMIN — MAGNESIUM SULFATE 4 G: 4 INJECTION INTRAVENOUS at 09:29

## 2025-01-04 RX ADMIN — LOSARTAN POTASSIUM 25 MG: 25 TABLET, FILM COATED ORAL at 09:01

## 2025-01-04 RX ADMIN — MAGNESIUM SULFATE HEPTAHYDRATE 2 G: 40 INJECTION, SOLUTION INTRAVENOUS at 06:22

## 2025-01-04 RX ADMIN — ACYCLOVIR 800 MG: 400 TABLET ORAL at 09:01

## 2025-01-04 ASSESSMENT — COGNITIVE AND FUNCTIONAL STATUS - GENERAL
TOILETING: A LOT
PERSONAL GROOMING: A LITTLE
WALKING IN HOSPITAL ROOM: A LITTLE
DRESSING REGULAR LOWER BODY CLOTHING: A LOT
STANDING UP FROM CHAIR USING ARMS: A LITTLE
MOVING FROM LYING ON BACK TO SITTING ON SIDE OF FLAT BED WITH BEDRAILS: A LITTLE
MOBILITY SCORE: 18
DRESSING REGULAR UPPER BODY CLOTHING: A LOT
CLIMB 3 TO 5 STEPS WITH RAILING: A LITTLE
HELP NEEDED FOR BATHING: A LOT
DAILY ACTIVITIY SCORE: 15
TURNING FROM BACK TO SIDE WHILE IN FLAT BAD: A LITTLE
MOVING TO AND FROM BED TO CHAIR: A LITTLE

## 2025-01-04 ASSESSMENT — PAIN SCALES - GENERAL
PAINLEVEL_OUTOF10: 0 - NO PAIN
PAINLEVEL_OUTOF10: 0 - NO PAIN

## 2025-01-04 ASSESSMENT — PAIN SCALES - WONG BAKER: WONGBAKER_NUMERICALRESPONSE: NO HURT

## 2025-01-04 ASSESSMENT — PAIN - FUNCTIONAL ASSESSMENT: PAIN_FUNCTIONAL_ASSESSMENT: 0-10

## 2025-01-04 NOTE — DISCHARGE SUMMARY
Discharge Diagnosis  Pancytopenia    Issues Requiring Follow-U  -Monitor electrolytes, had low magnesium, now on oral mag ox  -Sending on PRN lasix for swelling, did not require any during hospitalization.     Discharge Meds     Medication List      START taking these medications     vancomycin 125 mg capsule; Commonly known as: Vancocin; Take 1 capsule   (125 mg) by mouth 4 times a day for 26 doses.     CHANGE how you take these medications     calcium carbonate-vitamin D3 600 mg-5 mcg (200 unit) tablet; Commonly   known as: Calcium 600 + D(3); Take 1 tablet by mouth 2 times a day.; What   changed: how to take this   furosemide 20 mg tablet; Commonly known as: Lasix; Take 1 tablet (20 mg)   by mouth once daily as needed (LE swelling).; What changed: how much to   take, when to take this, reasons to take this   insulin lispro 100 unit/mL injection; Inject 0-5 Units under the skin 3   times daily (morning, midday, late afternoon). As directed by sliding   scale Do not hold when patient is not eating, continue order as scheduled   for hyperglycemia management. Insulin Lispro Corrective Scale #1     Hypoglycemia protocol Call LIP unit(s) if Blood Glucose is between 0 - 70   mg/dL  0 unit(s) if Blood glucose is between  1 unit(s) if Blood   glucose is between 151-200 2 unit(s) if Blood glucose is between 201-250 3   unit(s) if Blood glucose is between 251-300 4 unit(s) if Blood glucose is   between 301-350 5 unit(s) if Blood glucose is between 351-400  If blood   glucose is greater than 400 mg/dL, give max insulin per sliding scale AND   then contact provider.; What changed: how much to take, additional   instructions   Ocuvite with Lutein 300 mcg-200 mg-27 mg-2 mg tablet; Generic drug: vit   A,C and E-lutein-minerals; Take 1 tablet by mouth once daily.; What   changed: medication strength     CONTINUE taking these medications     acyclovir 800 mg tablet; Commonly known as: Zovirax; Take 1 tablet (800   mg) by  mouth once daily.   albuterol 90 mcg/actuation inhaler; Inhale 2 puffs every 4 hours if   needed for wheezing or shortness of breath.   amLODIPine 10 mg tablet; Commonly known as: Norvasc; Take 1 tablet (10   mg) by mouth once daily.   aspirin 81 mg EC tablet; Take 1 tablet (81 mg) by mouth once daily.   baclofen 5 mg tablet; Commonly known as: Lioresal; Take 1 tablet (5 mg)   by mouth every 8 hours if needed for muscle spasms.   cholecalciferol 50 mcg (2,000 unit) capsule; Commonly known as: Vitamin   D-3; Take 1 capsule (50 mcg) by mouth once every 24 hours.   HYDROmorphone 2 mg tablet; Commonly known as: Dilaudid; Take 1 tablet (2   mg) by mouth every 3 hours if needed for severe pain (7 - 10).   labetalol 200 mg tablet; Commonly known as: Normodyne; Take 1 tablet   (200 mg) by mouth every 12 hours.   Lantus U-100 Insulin 100 unit/mL injection; Generic drug: insulin   glargine; Inject 30 Units under the skin once daily at bedtime. Take as   directed per insulin instructions.   losartan 25 mg tablet; Commonly known as: Cozaar; Take 1 tablet (25 mg)   by mouth once daily.   omeprazole 40 mg DR capsule; Commonly known as: PriLOSEC; Take 1 capsule   (40 mg) by mouth 2 times a day before meals. Do not crush or chew.   ondansetron 4 mg tablet; Commonly known as: Zofran; Take 1 tablet (4 mg)   by mouth every 6 hours if needed for nausea or vomiting.       Test Results Pending At Discharge  Pending Labs       Order Current Status    VERIFY ABO/Rh Group Test In process            Hospital Course  [place] 74 y.o. female w/ a PMH Stage IV small cell lung cancer s/p 2 cycles carbo/etoposide/atezolizumab with liver mets, sjogrens, SLE, T2DM, HTN, HFpEF, Afib who presented to [facility] with history of malaise, dry heave, fatigue. Follows with Dr. Carlita Danielson outpatient but interested in transferring her care to  as he is leaving his current practice in the spring. She has tunneled L pleural catheter placed due to L  pleural effusion, likely malignant, and she drains this every other day at her SNF.    She was diagnosed a few months ago and started therapy in 11/2024. Was at [facility], had SOB and malaise and was sent to [facility]. She had Hgb 5.7 and was given 2 units RBC with improvement. They did LORIN which apparently had blood. She was transferred because they tried to consult oncology but realized they didn't have onc consult over the holidays.    On arrival she was in severe pain, endorsed nausea, low appetite, SOB, general pain and frustration with her care thus far. Her Hgb was 7.5 and platelets 19. She lost IV access and Midline was obtained.    Supportive oncology was consulted and we adjusted her regimen to: scheduled dilaudid 2 mg q4, with PRN 0.4 IV for breakthrough. Added methocarbamol for spasms, lidocaine patches, zofran and olanzapine.    12/30 To assess for other etiologies of fatigue TSH, free T4, ACTH, Cortisol were obtained but were unremarkable. Patient began reporting diarrhea at this time so C.Diff stool sample was obtained. 12/31 Patient had continued diarrhea, C. Diff PCR, and Toxin returned positive so PO Vancomycin 125 mg 4 times daily was started. 1/1 Patient had increased WOB so IV lasix was resumed. Patient had significant improvement, and was able to return to baseline O2 requirement.. 1/2 patient had 200 ml drained from her chest tube and had significant improvement in bowel movement reporting no loose stools. 1/3 Morning CBC revealed Hgb 6.6, GI was consulted and deemed that there was no need for endoscopic evaluation at this time. She is currently at her baseline O2 requirement and has no acute complaints at this time.    Her pancytopenia is secondary to her chemotherapy. Her fatigue is multifactorial (anemia, malnutrition, chemotherapy,    Pertinent Physical Exam At Time of Discharge  Vitals:    01/04/25 1156   BP: 163/60   Pulse: 60   Resp: 16   Temp: 36.3 °C (97.3 °F)   SpO2: 98%        Constitutional: no acute distress  Respiratory: No wheezes, rales, or rhonchi. Normal respiratory effort.  Cardiovascular: RRR, No murmurs, gallops, or rubs  Abdominal: Soft, nontender, nondistended. Bowel sounds present  Neuro: AAOx 4, CN II-XII grossly intact  MSK: No LE edema bilaterally, moving extremities well  Skin: Warm, dry. Well perfused  Psych: Appropriate mood and affect    Outpatient Follow-Up  [facility]-on[facility] Dr. Carlita Danielson 1/6/2025      Tegan Pruett MD

## 2025-01-04 NOTE — PROGRESS NOTES
Daily Progress Note    Alejandra Smith is a 74 y.o. female on day 7 of admission presenting with Pancytopenia.    Subjective   NAOE, HDS, denying chest pain and shortness of breath. No further episodes of diarrhea. Patient eager to be discharged to SNF today.      10 point ROS performed and negative unless stated above.          Objective   Weight: 102 kg (225 lb) (12/29/24 1247)    Daily Weight  12/29/24 : 102 kg (225 lb)      Last Recorded Vitals  Heart Rate:  [55-64]   Temp:  [36.1 °C (97 °F)-37.2 °C (98.9 °F)]   Resp:  [16-18]   BP: (132-168)/(53-65)   SpO2:  [95 %-100 %]      Physical Exam  Constitutional: no acute distress  Respiratory: No wheezes, rales, or rhonchi. Normal respiratory effort.  Cardiovascular: RRR, No murmurs, gallops, or rubs  Abdominal: Soft, nontender, nondistended. Bowel sounds present  Neuro: AAOx 4, CN II-XII grossly intact  MSK: No LE edema bilaterally, moving extremities well  Skin: Warm, dry. Well perfused  Psych: Appropriate mood and affect      Intake/Output last 3 Shifts:  I/O last 3 completed shifts:  In: 1190 (11.7 mL/kg) [P.O.:840; Blood:350]  Out: 450 (4.4 mL/kg) [Urine:450 (0.1 mL/kg/hr)]  Weight: 102.1 kg     Medications  Scheduled medications  acyclovir, 800 mg, oral, q24h KIMMY  amLODIPine, 10 mg, oral, Daily  enoxaparin, 40 mg, subcutaneous, q24h  insulin lispro, 0-5 Units, subcutaneous, TID AC  labetalol, 200 mg, oral, BID  lidocaine, 2 patch, transdermal, Daily  losartan, 25 mg, oral, Daily  magnesium sulfate, 2 g, intravenous, Once  melatonin, 5 mg, oral, Daily  OLANZapine, 2.5 mg, oral, Nightly  pantoprazole, 40 mg, oral, Daily before breakfast  vancomycin, 125 mg, oral, 4x daily      Continuous medications     PRN medications  PRN medications: albuterol, dextrose, dextrose, glucagon, glucagon, HYDROmorphone, HYDROmorphone, hydrOXYzine HCL, methocarbamol, ondansetron ODT       Relevant Results    Labs  Results from last 7 days   Lab Units 01/03/25  0530  "01/02/25  1209 01/01/25  1236 12/31/24  1135 12/30/24  1233   SODIUM mmol/L 131* 130* 131* 131* 132*   POTASSIUM mmol/L 4.6 4.5 4.8 3.5 2.8*   CHLORIDE mmol/L 94* 93* 93* 88* 89*   CO2 mmol/L 29 27 30 31 30   BUN mg/dL 12 12 11 13 10   CREATININE mg/dL 0.83 0.83 0.84 1.09* 0.81   CALCIUM mg/dL 9.1 9.0 9.0 8.1* 7.4*      Results from last 7 days   Lab Units 01/03/25  2031 01/03/25  0555 01/02/25  1209 01/01/25  1236 12/31/24  1135   HEMOGLOBIN g/dL 7.8* 6.6* 6.8* 7.6* 7.5*   WBC AUTO x10*3/uL 7.3 5.2 3.5* 2.2* 1.1*   PLATELETS AUTO x10*3/uL 181 139* 119* 89* 48*   HEMATOCRIT % 23.5* 20.6* 20.9* 23.4* 22.7*     Results from last 7 days   Lab Units 12/31/24  1135   ALK PHOS U/L 122   BILIRUBIN TOTAL mg/dL 0.6   PROTEIN TOTAL g/dL 6.2*   ALT U/L 11   AST U/L 8*            No results found for: \"NONUHFIRE\", \"LACTATE\"    Results from last 7 days   Lab Units 01/03/25  0556 01/02/25  1209 01/01/25  1236 12/31/24  1135 12/30/24  1233   GLUCOSE mg/dL 154* 240* 204* 144* 126*       Imaging  Bedside Midline Imaging    Result Date: 12/28/2024  These images are not reportable by radiology and will not be interpreted by  Radiologists.    XR chest 1 view    Result Date: 12/28/2024  Interpreted By:  Luke Bearden, STUDY: XR CHEST 1 VIEW;  12/28/2024 3:07 pm   INDICATION: Signs/Symptoms:SOB.   COMPARISON: None.   ACCESSION NUMBER(S): [id number]   ORDERING CLINICIAN: ISMAEL SNYDER   FINDINGS: AP radiograph of the chest. Patient's is significantly rotated towards right, limiting evaluation. There is poorly visualized tubular structure projecting over left lung base, which may represent a chest tube.   CARDIOMEDIASTINAL SILHOUETTE: The cardiomediastinal silhouette appears enlarged, exaggerated by patient rotation.   LUNGS: There is at least moderate-sized left pleural effusion and associated atelectasis/consolidation. There is mild right basilar atelectasis suggestion of trace/small right pleural effusion. Low lung volumes with " bronchovascular crowding with suggestion of mild pulmonary edema. There is no pneumothorax.   ABDOMEN: No remarkable upper abdominal findings.   BONES: No acute osseous abnormality.       Limited examination because of significant patient rotation within this limitation: 1. At least moderate-sized left pleural effusion and associated atelectasis/consolidation. Poorly visualized tubular structure projecting over left lung base, which may represent a chest tube. Correlate clinically. 2. Mild right basilar atelectasis suggestion of trace/small right pleural effusion. 3. Suggestion of mild pulmonary edema.   Signed by: Luke Bearden 12/28/2024 3:30 PM Dictation workstation:   [id number]                Assessment/Plan   Assessment & Plan  Pancytopenia    Pleural effusion    SCLC (small cell lung carcinoma), unspecified laterality      Alejandra Smith is a 74 y.o. female w/ a PMH Stage IV small cell lung cancer s/p 2 cycles carbo/etoposide/atezolizumab with liver mets, sjogrens, SLE, T2DM, HTN, HFpEF, Afib who presented to [facility] with history of malaise, dry heave, fatigue. She was found to be pancytopenic requiring transfusion. She has tunneled L pleural catheter placed due to L pleural effusion, likely malignant, and she drains this every other day at her SNF. She was transferred to  for oncology care. 12/30 patient with positive C.Diff PCR/toxin so oral vancomycin was started.      Updates 1/4/24  -planning for dc to SNF today      #Stage IV SCLC w/ mets to liver   ::Cytology report at  shows positive small cell cancer  :: s/p 2 cycles carbo/etoposide/atezolizumab   :: CT scan showed decrease in size of liver mets suggesting good response to therapy  ::On 4-6L NC  :: Left tunneled cath placed 11/8/2024, drains L pleural effusion every other day at SNF  - dietician consulted to optimize nutrition  - avoid steroids given active immunotherapy     #Pancytopenia  #Anemia  ::Likely secondary to her  chemotherapy -> appears last cycle was 12/18, making now her genevieve   ::Hg 5.7 at OSH, was given 2 units PRBC -> 7.5 on admission  :: reported 1x bloody stool  -Sent Type/Screen, Verab, consented  -blood smear ordered  -c/w daily acyclovir 800mg for VXV ppx per documentation  - transfuse for Hgb < 7 and platelets < 10 or < 20 with active bleeding  - Midline placed for IV access  - GI consulted out of concern for GI bleed awaiting recs        #C.Diff infection improving   :: C.Diff PCR positive, C difficile Toxin  A/B positive   :: Patient with continued diarrhea   - Started PO Vanc 125 mg 4x daily for 10 days      #Paroxysmal Afib  -On labtalol 200mg BID  - patient refusing telemetry, will monitor on PE daily for regular vs irregular HR and encourage tele monitoring      #HFpEF  ::Last reported EF 55-60%, grade 2 diastolic dysfunction, mild LVH, mild RV enlargement at OSH  -Lasix 40mg daily  -Losartan 20mg daily     #GERD  -omeprazole 40mg daily     #T2DM  ::No A1c baseline  -30 units glargine at home, halved while inpatient to 15 units  -SSI  -A1c ordered     #YOVANA  -Rt consult for cpap settings        Medical Check List   FEN  -Fluids: Will replete PRN   -Electrolytes: Will replete PRN, with goals of Mg >2, K>4  -Nutrition: regular- ensure supplements chocolate flavored  Prophylaxis:  -DVT ppx: holding w/ thrombocytopenia   -GI ppx/Bowel care: PPI  -Abx: None- neutropenic precautions, if febrile cultures + abx   -Pain regimen: dilaudid 2mg q4 hours  Hardware:  -Drains: L tunneled pleural catheter  -Lines: PIV  Social:  -Code: Full Code  -NOK/Surrogate Decision Maker: Lidia Calix (Sibling)  [phone] (Home Phone)      Patient assessment and plan staffed with the attending physician on service, Dr. Wang.       01/04/25 at 12:28 PM - Tegan Pruett MD

## 2025-01-04 NOTE — NURSING NOTE
Alejandra Smith discharged at 1:17 PM  and 01/04/25   Patient discharged to SNF via [facility] .  SNF report called to [facility] .  Discharge education and teaching completed with Alejandra Smith   RN signature: CARLOS HOOPERN, RN, CMSRN

## 2025-01-04 NOTE — NURSING NOTE
Report attempted x2 to [facility]. RN left call back number for nursing staff at [facility].

## 2025-01-04 NOTE — PROGRESS NOTES
01/04/25 1203   Discharge Planning   Home or Post Acute Services Post acute facilities (Rehab/SNF/etc)   Type of Post Acute Facility Services Skilled nursing  ([facility])     Team and pt's nurse aware of transportation arrangements today with [facility]; 1 pm  time previously arranged. Nurse report number provided: [phone] . Cl liaison at [facility] notified that nurse is expecting return call from SNF nursing. GF and recent updates attached and sent to [facility]. Will continue to follow.    Joycelyn Nunn RN  (Weekend Transitional Care Coordinator-TCC, send Epic message)

## 2025-01-29 ENCOUNTER — APPOINTMENT (OUTPATIENT)
Dept: RADIOLOGY | Facility: HOSPITAL | Age: 75
End: 2025-01-29
Payer: MEDICARE

## 2025-01-29 ENCOUNTER — HOSPITAL ENCOUNTER (INPATIENT)
Facility: HOSPITAL | Age: 75
LOS: 3 days | Discharge: HOME | End: 2025-02-01
Attending: EMERGENCY MEDICINE | Admitting: INTERNAL MEDICINE
Payer: MEDICARE

## 2025-01-29 ENCOUNTER — APPOINTMENT (OUTPATIENT)
Dept: CARDIOLOGY | Facility: HOSPITAL | Age: 75
End: 2025-01-29
Payer: MEDICARE

## 2025-01-29 DIAGNOSIS — R06.09 DOE (DYSPNEA ON EXERTION): ICD-10-CM

## 2025-01-29 DIAGNOSIS — J44.1 COPD EXACERBATION (MULTI): Primary | ICD-10-CM

## 2025-01-29 DIAGNOSIS — I26.99 PULMONARY EMBOLISM, OTHER, UNSPECIFIED CHRONICITY, UNSPECIFIED WHETHER ACUTE COR PULMONALE PRESENT (MULTI): ICD-10-CM

## 2025-01-29 DIAGNOSIS — C34.90: ICD-10-CM

## 2025-01-29 DIAGNOSIS — D64.9 ANEMIA REQUIRING TRANSFUSIONS: ICD-10-CM

## 2025-01-29 DIAGNOSIS — R06.00 DYSPNEA, UNSPECIFIED: ICD-10-CM

## 2025-01-29 LAB
ABO GROUP (TYPE) IN BLOOD: NORMAL
ABO GROUP (TYPE) IN BLOOD: NORMAL
ALBUMIN SERPL BCP-MCNC: 3.2 G/DL (ref 3.4–5)
ALP SERPL-CCNC: 122 U/L (ref 33–136)
ALT SERPL W P-5'-P-CCNC: 8 U/L (ref 7–45)
ANION GAP BLDV CALCULATED.4IONS-SCNC: 11 MMOL/L (ref 10–25)
ANION GAP SERPL CALC-SCNC: 15 MMOL/L (ref 10–20)
ANTIBODY SCREEN: NORMAL
AST SERPL W P-5'-P-CCNC: 10 U/L (ref 9–39)
BASE EXCESS BLDV CALC-SCNC: 1.9 MMOL/L (ref -2–3)
BASOPHILS # BLD MANUAL: 0.02 X10*3/UL (ref 0–0.1)
BASOPHILS NFR BLD MANUAL: 1 %
BILIRUB SERPL-MCNC: 0.3 MG/DL (ref 0–1.2)
BNP SERPL-MCNC: 182 PG/ML (ref 0–99)
BODY TEMPERATURE: 37 DEGREES CELSIUS
BUN SERPL-MCNC: 15 MG/DL (ref 6–23)
CA-I BLDV-SCNC: 1.2 MMOL/L (ref 1.1–1.33)
CALCIUM SERPL-MCNC: 8.7 MG/DL (ref 8.6–10.3)
CARDIAC TROPONIN I PNL SERPL HS: 12 NG/L (ref 0–13)
CARDIAC TROPONIN I PNL SERPL HS: 12 NG/L (ref 0–13)
CHLORIDE BLDV-SCNC: 99 MMOL/L (ref 98–107)
CHLORIDE SERPL-SCNC: 95 MMOL/L (ref 98–107)
CO2 SERPL-SCNC: 26 MMOL/L (ref 21–32)
CREAT SERPL-MCNC: 0.94 MG/DL (ref 0.5–1.05)
EGFRCR SERPLBLD CKD-EPI 2021: 64 ML/MIN/1.73M*2
EOSINOPHIL # BLD MANUAL: 0.03 X10*3/UL (ref 0–0.4)
EOSINOPHIL NFR BLD MANUAL: 2 %
ERYTHROCYTE [DISTWIDTH] IN BLOOD BY AUTOMATED COUNT: 20.4 % (ref 11.5–14.5)
FLUAV RNA RESP QL NAA+PROBE: NOT DETECTED
FLUBV RNA RESP QL NAA+PROBE: NOT DETECTED
GLUCOSE BLD MANUAL STRIP-MCNC: 127 MG/DL (ref 74–99)
GLUCOSE BLD MANUAL STRIP-MCNC: 308 MG/DL (ref 74–99)
GLUCOSE BLDV-MCNC: 134 MG/DL (ref 74–99)
GLUCOSE SERPL-MCNC: 131 MG/DL (ref 74–99)
HCO3 BLDV-SCNC: 26.6 MMOL/L (ref 22–26)
HCT VFR BLD AUTO: 18.6 % (ref 36–46)
HCT VFR BLD EST: 17 % (ref 36–46)
HGB BLD-MCNC: 5.9 G/DL (ref 12–16)
HGB BLDV-MCNC: 5.6 G/DL (ref 12–16)
HYPOCHROMIA BLD QL SMEAR: ABNORMAL
IMM GRANULOCYTES # BLD AUTO: 0.04 X10*3/UL (ref 0–0.5)
IMM GRANULOCYTES NFR BLD AUTO: 2.4 % (ref 0–0.9)
INHALED O2 CONCENTRATION: 38 %
INR PPP: 1.1 (ref 0.9–1.1)
LACTATE BLDV-SCNC: 1.8 MMOL/L (ref 0.4–2)
LACTATE BLDV-SCNC: 2.1 MMOL/L (ref 0.4–2)
LACTATE SERPL-SCNC: 2 MMOL/L (ref 0.4–2)
LYMPHOCYTES # BLD MANUAL: 0.95 X10*3/UL (ref 0.8–3)
LYMPHOCYTES NFR BLD MANUAL: 56 %
MAGNESIUM SERPL-MCNC: 0.98 MG/DL (ref 1.6–2.4)
MCH RBC QN AUTO: 27.1 PG (ref 26–34)
MCHC RBC AUTO-ENTMCNC: 31.7 G/DL (ref 32–36)
MCV RBC AUTO: 85 FL (ref 80–100)
METAMYELOCYTES # BLD MANUAL: 0.03 X10*3/UL
METAMYELOCYTES NFR BLD MANUAL: 2 %
MONOCYTES # BLD MANUAL: 0.1 X10*3/UL (ref 0.05–0.8)
MONOCYTES NFR BLD MANUAL: 6 %
NEUTROPHILS # BLD MANUAL: 0.56 X10*3/UL (ref 1.6–5.5)
NEUTS BAND # BLD MANUAL: 0.03 X10*3/UL (ref 0–0.5)
NEUTS BAND NFR BLD MANUAL: 2 %
NEUTS SEG # BLD MANUAL: 0.53 X10*3/UL (ref 1.6–5)
NEUTS SEG NFR BLD MANUAL: 31 %
NRBC BLD-RTO: 0 /100 WBCS (ref 0–0)
OVALOCYTES BLD QL SMEAR: ABNORMAL
OXYHGB MFR BLDV: 75.9 % (ref 45–75)
PCO2 BLDV: 41 MM HG (ref 41–51)
PH BLDV: 7.42 PH (ref 7.33–7.43)
PHOSPHATE SERPL-MCNC: 3.3 MG/DL (ref 2.5–4.9)
PLATELET # BLD AUTO: 131 X10*3/UL (ref 150–450)
PO2 BLDV: 49 MM HG (ref 35–45)
POLYCHROMASIA BLD QL SMEAR: ABNORMAL
POTASSIUM BLDV-SCNC: 4.1 MMOL/L (ref 3.5–5.3)
POTASSIUM SERPL-SCNC: 4 MMOL/L (ref 3.5–5.3)
PROT SERPL-MCNC: 6.3 G/DL (ref 6.4–8.2)
PROTHROMBIN TIME: 12.1 SECONDS (ref 9.8–12.8)
RBC # BLD AUTO: 2.18 X10*6/UL (ref 4–5.2)
RBC MORPH BLD: ABNORMAL
RH FACTOR (ANTIGEN D): NORMAL
RH FACTOR (ANTIGEN D): NORMAL
SAO2 % BLDV: 78 % (ref 45–75)
SARS-COV-2 RNA RESP QL NAA+PROBE: NOT DETECTED
SODIUM BLDV-SCNC: 132 MMOL/L (ref 136–145)
SODIUM SERPL-SCNC: 132 MMOL/L (ref 136–145)
TOTAL CELLS COUNTED BLD: 100
TOXIC GRANULES BLD QL SMEAR: PRESENT
WBC # BLD AUTO: 1.7 X10*3/UL (ref 4.4–11.3)

## 2025-01-29 PROCEDURE — 86901 BLOOD TYPING SEROLOGIC RH(D): CPT | Performed by: EMERGENCY MEDICINE

## 2025-01-29 PROCEDURE — 71045 X-RAY EXAM CHEST 1 VIEW: CPT

## 2025-01-29 PROCEDURE — 2500000001 HC RX 250 WO HCPCS SELF ADMINISTERED DRUGS (ALT 637 FOR MEDICARE OP): Performed by: REGISTERED NURSE

## 2025-01-29 PROCEDURE — 36415 COLL VENOUS BLD VENIPUNCTURE: CPT | Performed by: EMERGENCY MEDICINE

## 2025-01-29 PROCEDURE — 82947 ASSAY GLUCOSE BLOOD QUANT: CPT

## 2025-01-29 PROCEDURE — 93005 ELECTROCARDIOGRAM TRACING: CPT

## 2025-01-29 PROCEDURE — 2550000001 HC RX 255 CONTRASTS: Performed by: EMERGENCY MEDICINE

## 2025-01-29 PROCEDURE — 96366 THER/PROPH/DIAG IV INF ADDON: CPT

## 2025-01-29 PROCEDURE — 94640 AIRWAY INHALATION TREATMENT: CPT

## 2025-01-29 PROCEDURE — 74177 CT ABD & PELVIS W/CONTRAST: CPT

## 2025-01-29 PROCEDURE — 74177 CT ABD & PELVIS W/CONTRAST: CPT | Performed by: RADIOLOGY

## 2025-01-29 PROCEDURE — 96365 THER/PROPH/DIAG IV INF INIT: CPT

## 2025-01-29 PROCEDURE — 2500000004 HC RX 250 GENERAL PHARMACY W/ HCPCS (ALT 636 FOR OP/ED): Performed by: EMERGENCY MEDICINE

## 2025-01-29 PROCEDURE — 80053 COMPREHEN METABOLIC PANEL: CPT | Performed by: EMERGENCY MEDICINE

## 2025-01-29 PROCEDURE — 84484 ASSAY OF TROPONIN QUANT: CPT | Performed by: EMERGENCY MEDICINE

## 2025-01-29 PROCEDURE — 83735 ASSAY OF MAGNESIUM: CPT | Performed by: EMERGENCY MEDICINE

## 2025-01-29 PROCEDURE — 83605 ASSAY OF LACTIC ACID: CPT | Performed by: EMERGENCY MEDICINE

## 2025-01-29 PROCEDURE — 84100 ASSAY OF PHOSPHORUS: CPT | Performed by: EMERGENCY MEDICINE

## 2025-01-29 PROCEDURE — 85610 PROTHROMBIN TIME: CPT | Performed by: EMERGENCY MEDICINE

## 2025-01-29 PROCEDURE — 93970 EXTREMITY STUDY: CPT

## 2025-01-29 PROCEDURE — 71275 CT ANGIOGRAPHY CHEST: CPT | Performed by: RADIOLOGY

## 2025-01-29 PROCEDURE — 96375 TX/PRO/DX INJ NEW DRUG ADDON: CPT

## 2025-01-29 PROCEDURE — 96374 THER/PROPH/DIAG INJ IV PUSH: CPT | Mod: 59

## 2025-01-29 PROCEDURE — P9040 RBC LEUKOREDUCED IRRADIATED: HCPCS

## 2025-01-29 PROCEDURE — 2500000002 HC RX 250 W HCPCS SELF ADMINISTERED DRUGS (ALT 637 FOR MEDICARE OP, ALT 636 FOR OP/ED): Performed by: EMERGENCY MEDICINE

## 2025-01-29 PROCEDURE — 83880 ASSAY OF NATRIURETIC PEPTIDE: CPT | Performed by: EMERGENCY MEDICINE

## 2025-01-29 PROCEDURE — 71275 CT ANGIOGRAPHY CHEST: CPT

## 2025-01-29 PROCEDURE — 87636 SARSCOV2 & INF A&B AMP PRB: CPT | Performed by: EMERGENCY MEDICINE

## 2025-01-29 PROCEDURE — 71045 X-RAY EXAM CHEST 1 VIEW: CPT | Performed by: RADIOLOGY

## 2025-01-29 PROCEDURE — 1100000001 HC PRIVATE ROOM DAILY

## 2025-01-29 PROCEDURE — 85007 BL SMEAR W/DIFF WBC COUNT: CPT | Performed by: EMERGENCY MEDICINE

## 2025-01-29 PROCEDURE — 36430 TRANSFUSION BLD/BLD COMPNT: CPT

## 2025-01-29 PROCEDURE — 82435 ASSAY OF BLOOD CHLORIDE: CPT | Performed by: EMERGENCY MEDICINE

## 2025-01-29 PROCEDURE — 93970 EXTREMITY STUDY: CPT | Performed by: RADIOLOGY

## 2025-01-29 PROCEDURE — 99223 1ST HOSP IP/OBS HIGH 75: CPT | Performed by: INTERNAL MEDICINE

## 2025-01-29 PROCEDURE — 86923 COMPATIBILITY TEST ELECTRIC: CPT

## 2025-01-29 PROCEDURE — 85027 COMPLETE CBC AUTOMATED: CPT | Performed by: EMERGENCY MEDICINE

## 2025-01-29 PROCEDURE — 2500000001 HC RX 250 WO HCPCS SELF ADMINISTERED DRUGS (ALT 637 FOR MEDICARE OP): Performed by: EMERGENCY MEDICINE

## 2025-01-29 PROCEDURE — 99291 CRITICAL CARE FIRST HOUR: CPT | Mod: 25 | Performed by: EMERGENCY MEDICINE

## 2025-01-29 RX ORDER — MAGNESIUM SULFATE HEPTAHYDRATE 40 MG/ML
4 INJECTION, SOLUTION INTRAVENOUS ONCE
Status: COMPLETED | OUTPATIENT
Start: 2025-01-29 | End: 2025-01-29

## 2025-01-29 RX ORDER — ACETAMINOPHEN 325 MG/1
975 TABLET ORAL ONCE
Status: COMPLETED | OUTPATIENT
Start: 2025-01-29 | End: 2025-01-29

## 2025-01-29 RX ORDER — ONDANSETRON HYDROCHLORIDE 2 MG/ML
4 INJECTION, SOLUTION INTRAVENOUS ONCE
Status: COMPLETED | OUTPATIENT
Start: 2025-01-29 | End: 2025-01-29

## 2025-01-29 RX ORDER — IPRATROPIUM BROMIDE AND ALBUTEROL SULFATE 2.5; .5 MG/3ML; MG/3ML
3 SOLUTION RESPIRATORY (INHALATION) EVERY 20 MIN
Status: COMPLETED | OUTPATIENT
Start: 2025-01-29 | End: 2025-01-29

## 2025-01-29 RX ORDER — IPRATROPIUM BROMIDE AND ALBUTEROL SULFATE 2.5; .5 MG/3ML; MG/3ML
3 SOLUTION RESPIRATORY (INHALATION) 3 TIMES DAILY PRN
Status: DISCONTINUED | OUTPATIENT
Start: 2025-01-29 | End: 2025-02-01 | Stop reason: HOSPADM

## 2025-01-29 RX ORDER — INSULIN LISPRO 100 [IU]/ML
0-10 INJECTION, SOLUTION INTRAVENOUS; SUBCUTANEOUS
Status: DISCONTINUED | OUTPATIENT
Start: 2025-01-29 | End: 2025-02-01 | Stop reason: HOSPADM

## 2025-01-29 RX ORDER — FUROSEMIDE 10 MG/ML
60 INJECTION INTRAMUSCULAR; INTRAVENOUS ONCE
Status: COMPLETED | OUTPATIENT
Start: 2025-01-29 | End: 2025-01-29

## 2025-01-29 RX ORDER — DEXTROSE 50 % IN WATER (D50W) INTRAVENOUS SYRINGE
25
Status: DISCONTINUED | OUTPATIENT
Start: 2025-01-29 | End: 2025-02-01 | Stop reason: HOSPADM

## 2025-01-29 RX ORDER — LANOLIN ALCOHOL/MO/W.PET/CERES
800 CREAM (GRAM) TOPICAL ONCE
Status: COMPLETED | OUTPATIENT
Start: 2025-01-29 | End: 2025-01-29

## 2025-01-29 RX ORDER — ALUMINUM HYDROXIDE, MAGNESIUM HYDROXIDE, AND SIMETHICONE 1200; 120; 1200 MG/30ML; MG/30ML; MG/30ML
20 SUSPENSION ORAL 4 TIMES DAILY PRN
Status: DISCONTINUED | OUTPATIENT
Start: 2025-01-29 | End: 2025-02-01 | Stop reason: HOSPADM

## 2025-01-29 RX ORDER — DEXTROSE 50 % IN WATER (D50W) INTRAVENOUS SYRINGE
12.5
Status: DISCONTINUED | OUTPATIENT
Start: 2025-01-29 | End: 2025-02-01 | Stop reason: HOSPADM

## 2025-01-29 RX ORDER — INSULIN LISPRO 100 [IU]/ML
0-10 INJECTION, SOLUTION INTRAVENOUS; SUBCUTANEOUS
Status: DISCONTINUED | OUTPATIENT
Start: 2025-01-30 | End: 2025-01-29

## 2025-01-29 RX ORDER — MORPHINE SULFATE 4 MG/ML
4 INJECTION INTRAVENOUS ONCE
Status: COMPLETED | OUTPATIENT
Start: 2025-01-29 | End: 2025-01-29

## 2025-01-29 RX ORDER — HYDROMORPHONE HYDROCHLORIDE 2 MG/1
2 TABLET ORAL EVERY 4 HOURS PRN
Status: DISCONTINUED | OUTPATIENT
Start: 2025-01-29 | End: 2025-02-01 | Stop reason: HOSPADM

## 2025-01-29 RX ADMIN — MORPHINE SULFATE 4 MG: 4 INJECTION, SOLUTION INTRAMUSCULAR; INTRAVENOUS at 14:24

## 2025-01-29 RX ADMIN — ALUMINUM HYDROXIDE, MAGNESIUM HYDROXIDE, AND DIMETHICONE 20 ML: 200; 20; 200 SUSPENSION ORAL at 23:05

## 2025-01-29 RX ADMIN — IPRATROPIUM BROMIDE AND ALBUTEROL SULFATE 3 ML: .5; 3 SOLUTION RESPIRATORY (INHALATION) at 14:30

## 2025-01-29 RX ADMIN — MAGNESIUM SULFATE HEPTAHYDRATE 4 G: 40 INJECTION, SOLUTION INTRAVENOUS at 16:44

## 2025-01-29 RX ADMIN — IPRATROPIUM BROMIDE AND ALBUTEROL SULFATE 3 ML: .5; 3 SOLUTION RESPIRATORY (INHALATION) at 14:22

## 2025-01-29 RX ADMIN — IPRATROPIUM BROMIDE AND ALBUTEROL SULFATE 3 ML: .5; 3 SOLUTION RESPIRATORY (INHALATION) at 14:47

## 2025-01-29 RX ADMIN — ONDANSETRON 4 MG: 2 INJECTION INTRAMUSCULAR; INTRAVENOUS at 14:23

## 2025-01-29 RX ADMIN — ACETAMINOPHEN 975 MG: 325 TABLET ORAL at 14:23

## 2025-01-29 RX ADMIN — Medication 800 MG: at 16:44

## 2025-01-29 RX ADMIN — IOHEXOL 75 ML: 350 INJECTION, SOLUTION INTRAVENOUS at 17:32

## 2025-01-29 RX ADMIN — HYDROMORPHONE HYDROCHLORIDE 0.5 MG: 0.5 INJECTION, SOLUTION INTRAMUSCULAR; INTRAVENOUS; SUBCUTANEOUS at 19:53

## 2025-01-29 RX ADMIN — FUROSEMIDE 60 MG: 10 INJECTION, SOLUTION INTRAVENOUS at 19:53

## 2025-01-29 SDOH — ECONOMIC STABILITY: INCOME INSECURITY: IN THE PAST 12 MONTHS HAS THE ELECTRIC, GAS, OIL, OR WATER COMPANY THREATENED TO SHUT OFF SERVICES IN YOUR HOME?: NO

## 2025-01-29 SDOH — SOCIAL STABILITY: SOCIAL INSECURITY: WITHIN THE LAST YEAR, HAVE YOU BEEN AFRAID OF YOUR PARTNER OR EX-PARTNER?: NO

## 2025-01-29 SDOH — ECONOMIC STABILITY: FOOD INSECURITY: WITHIN THE PAST 12 MONTHS, THE FOOD YOU BOUGHT JUST DIDN'T LAST AND YOU DIDN'T HAVE MONEY TO GET MORE.: NEVER TRUE

## 2025-01-29 SDOH — ECONOMIC STABILITY: HOUSING INSECURITY: IN THE LAST 12 MONTHS, WAS THERE A TIME WHEN YOU WERE NOT ABLE TO PAY THE MORTGAGE OR RENT ON TIME?: NO

## 2025-01-29 SDOH — ECONOMIC STABILITY: HOUSING INSECURITY: IN THE PAST 12 MONTHS, HOW MANY TIMES HAVE YOU MOVED WHERE YOU WERE LIVING?: 0

## 2025-01-29 SDOH — SOCIAL STABILITY: SOCIAL INSECURITY: WERE YOU ABLE TO COMPLETE ALL THE BEHAVIORAL HEALTH SCREENINGS?: YES

## 2025-01-29 SDOH — SOCIAL STABILITY: SOCIAL INSECURITY: HAVE YOU HAD THOUGHTS OF HARMING ANYONE ELSE?: NO

## 2025-01-29 SDOH — ECONOMIC STABILITY: TRANSPORTATION INSECURITY: IN THE PAST 12 MONTHS, HAS LACK OF TRANSPORTATION KEPT YOU FROM MEDICAL APPOINTMENTS OR FROM GETTING MEDICATIONS?: NO

## 2025-01-29 SDOH — ECONOMIC STABILITY: FOOD INSECURITY: HOW HARD IS IT FOR YOU TO PAY FOR THE VERY BASICS LIKE FOOD, HOUSING, MEDICAL CARE, AND HEATING?: NOT HARD AT ALL

## 2025-01-29 SDOH — ECONOMIC STABILITY: HOUSING INSECURITY: AT ANY TIME IN THE PAST 12 MONTHS, WERE YOU HOMELESS OR LIVING IN A SHELTER (INCLUDING NOW)?: NO

## 2025-01-29 SDOH — SOCIAL STABILITY: SOCIAL INSECURITY: ABUSE: ADULT

## 2025-01-29 SDOH — ECONOMIC STABILITY: FOOD INSECURITY: WITHIN THE PAST 12 MONTHS, YOU WORRIED THAT YOUR FOOD WOULD RUN OUT BEFORE YOU GOT THE MONEY TO BUY MORE.: NEVER TRUE

## 2025-01-29 SDOH — SOCIAL STABILITY: SOCIAL INSECURITY: WITHIN THE LAST YEAR, HAVE YOU BEEN HUMILIATED OR EMOTIONALLY ABUSED IN OTHER WAYS BY YOUR PARTNER OR EX-PARTNER?: NO

## 2025-01-29 ASSESSMENT — LIFESTYLE VARIABLES
HOW MANY STANDARD DRINKS CONTAINING ALCOHOL DO YOU HAVE ON A TYPICAL DAY: PATIENT DOES NOT DRINK
AUDIT-C TOTAL SCORE: 0
SKIP TO QUESTIONS 9-10: 1
HOW OFTEN DO YOU HAVE A DRINK CONTAINING ALCOHOL: NEVER
AUDIT-C TOTAL SCORE: 0
TOTAL SCORE: 0
HAVE PEOPLE ANNOYED YOU BY CRITICIZING YOUR DRINKING: NO
EVER HAD A DRINK FIRST THING IN THE MORNING TO STEADY YOUR NERVES TO GET RID OF A HANGOVER: NO
EVER FELT BAD OR GUILTY ABOUT YOUR DRINKING: NO
HOW OFTEN DO YOU HAVE 6 OR MORE DRINKS ON ONE OCCASION: NEVER
HAVE YOU EVER FELT YOU SHOULD CUT DOWN ON YOUR DRINKING: NO

## 2025-01-29 ASSESSMENT — PAIN DESCRIPTION - DESCRIPTORS: DESCRIPTORS: ACHING

## 2025-01-29 ASSESSMENT — COGNITIVE AND FUNCTIONAL STATUS - GENERAL
DRESSING REGULAR LOWER BODY CLOTHING: A LITTLE
CLIMB 3 TO 5 STEPS WITH RAILING: A LOT
TOILETING: A LITTLE
MOVING TO AND FROM BED TO CHAIR: A LOT
DRESSING REGULAR LOWER BODY CLOTHING: A LITTLE
DAILY ACTIVITIY SCORE: 19
WALKING IN HOSPITAL ROOM: A LITTLE
CLIMB 3 TO 5 STEPS WITH RAILING: A LITTLE
HELP NEEDED FOR BATHING: A LITTLE
STANDING UP FROM CHAIR USING ARMS: A LOT
DRESSING REGULAR UPPER BODY CLOTHING: A LOT
TOILETING: A LITTLE
MOBILITY SCORE: 16
PATIENT BASELINE BEDBOUND: NO
WALKING IN HOSPITAL ROOM: A LOT
MOBILITY SCORE: 22
HELP NEEDED FOR BATHING: A LITTLE
DRESSING REGULAR UPPER BODY CLOTHING: A LITTLE
DAILY ACTIVITIY SCORE: 20

## 2025-01-29 ASSESSMENT — COLUMBIA-SUICIDE SEVERITY RATING SCALE - C-SSRS
6. HAVE YOU EVER DONE ANYTHING, STARTED TO DO ANYTHING, OR PREPARED TO DO ANYTHING TO END YOUR LIFE?: NO
1. IN THE PAST MONTH, HAVE YOU WISHED YOU WERE DEAD OR WISHED YOU COULD GO TO SLEEP AND NOT WAKE UP?: NO
2. HAVE YOU ACTUALLY HAD ANY THOUGHTS OF KILLING YOURSELF?: NO

## 2025-01-29 ASSESSMENT — PAIN SCALES - GENERAL
PAINLEVEL_OUTOF10: 8
PAINLEVEL_OUTOF10: 5 - MODERATE PAIN
PAINLEVEL_OUTOF10: 6

## 2025-01-29 ASSESSMENT — ACTIVITIES OF DAILY LIVING (ADL)
HEARING - LEFT EAR: FUNCTIONAL
WALKS IN HOME: INDEPENDENT
PATIENT'S MEMORY ADEQUATE TO SAFELY COMPLETE DAILY ACTIVITIES?: YES
LACK_OF_TRANSPORTATION: NO
ADEQUATE_TO_COMPLETE_ADL: YES
BATHING: NEEDS ASSISTANCE
JUDGMENT_ADEQUATE_SAFELY_COMPLETE_DAILY_ACTIVITIES: YES
TOILETING: INDEPENDENT
DRESSING YOURSELF: INDEPENDENT
FEEDING YOURSELF: INDEPENDENT
ASSISTIVE_DEVICE: WHEELCHAIR
HEARING - RIGHT EAR: FUNCTIONAL
GROOMING: INDEPENDENT

## 2025-01-29 ASSESSMENT — PATIENT HEALTH QUESTIONNAIRE - PHQ9
2. FEELING DOWN, DEPRESSED OR HOPELESS: NOT AT ALL
1. LITTLE INTEREST OR PLEASURE IN DOING THINGS: NOT AT ALL
SUM OF ALL RESPONSES TO PHQ9 QUESTIONS 1 & 2: 0

## 2025-01-29 ASSESSMENT — PAIN - FUNCTIONAL ASSESSMENT
PAIN_FUNCTIONAL_ASSESSMENT: 0-10
PAIN_FUNCTIONAL_ASSESSMENT: 0-10

## 2025-01-29 ASSESSMENT — PAIN DESCRIPTION - FREQUENCY: FREQUENCY: INTERMITTENT

## 2025-01-29 ASSESSMENT — PAIN DESCRIPTION - ORIENTATION: ORIENTATION: RIGHT;LEFT

## 2025-01-29 ASSESSMENT — PAIN DESCRIPTION - LOCATION: LOCATION: RIB CAGE

## 2025-01-29 NOTE — ED PROVIDER NOTES
Emergency Department Provider Note        MEDICAL DECISION MAKING:  Medical Decision Making       25     Chief Complaint   Patient presents with    Shortness of Breath    Leg Swelling    Dizziness    Nausea    Vomiting     Pt has c/c of bilateral lower rib pain duration 2hrs and reported general weakness, SOB, dizziness, n/v, bilateral leg swelling duration 5 days.       History/Exam limitations: none.   Additional history was obtained from patient and EMS personnel.    HPI: Alejandra Smith  is a 74 y.o. presents with complaints of shortness of breath, dizziness as well as nausea or vomiting.  Complains of bilateral lower rib pain as well for 2 hours.  Reports malaise generalized weakness.  Bilateral leg swelling for 5 days worse with right and left.  Denies back pain.  Denies abdominal pain.  She does wear home oxygen 4 L at baseline.  History of COPD and asthma as well.  Does not smoke.  Past medical records, Past medical history and surgical history reviewed and as documented.  History reviewed and as noted. past medical records reviewed.    Active Ambulatory Problems     Diagnosis Date Noted    Pancytopenia 2024    Pleural effusion 2025    SCLC (small cell lung carcinoma), unspecified laterality 2025     Resolved Ambulatory Problems     Diagnosis Date Noted    No Resolved Ambulatory Problems     No Additional Past Medical History        No past surgical history on file.     No family history on file.     Social History     Tobacco Use    Smoking status: Former     Current packs/day: 0.00     Types: Cigarettes     Quit date: 2023     Years since quittin.5    Smokeless tobacco: Never        Allergies   Allergen Reactions    Keflex [Cephalexin] Unknown    Metformin Diarrhea and Unknown     severe diarrhea    Chantix [Varenicline] Unknown    Cymbalta [Duloxetine] Unknown    Hydralazine Hives    Levofloxacin Unknown and Hives     IV Levaquin    Sulfasalazine Headache and  "Nausea/vomiting        Unless otherwise stated in this report the patient's positive and negative responses for review of systems for constitutional, eyes, ENT, cardiovascular, respiratory, gastrointestinal, neurological, genitourinary, musculoskeletal, and integument systems and related systems to the presenting problem are either as stated in the HPI or were not pertinent or were negative for the symptoms and/or complaints related to the presenting medical problem.    PHYSICAL EXAM  Triage/nursing notes and vital signs reviewed as available and as noted    Vitals:    01/29/25 1346 01/29/25 1352   BP:  150/61   BP Location:  Left arm   Patient Position:  Sitting   Pulse:  68   Resp: (!) 26 (!) 28   Temp: 36.3 °C (97.3 °F) 36.2 °C (97.2 °F)   TempSrc: Tympanic Tympanic   SpO2:  100%   Weight:  100 kg (220 lb 7.4 oz)   Height:  1.702 m (5' 7\")           Constitutional:       Appearance: Patient not ill-appearing or toxic-appearing.   HENT:      Head: Atraumatic.      Mouth/Throat:      Mouth: Mucous membranes are moist.      Pharynx: Oropharynx is clear. No pharyngeal swelling.      Neck: No Obvious JVD. Trachea midline. No neck swelling.  Eyes:      Normal Ocular tracking  Cardiovascular:      Rate and Rhythm: Normal rate and regular rhythm.      Pulses: Normal pulses.      Heart sounds: No murmur heard.  Pulmonary:      Effort: Tachypnea with accessory muscle use.     Breath sounds: Diminished breath sounds in all fields with apical wheezing  Abdominal:      General: Bowel sounds are normal.      Palpations: Abdomen is soft.      Tenderness: There is no abdominal tenderness. There is no guarding or rebound.   Musculoskeletal:      Cervical back: Neck supple.      Right lower leg: No tenderness.  2+ edema.      Left lower leg: No tenderness.  1+ edema.   Skin:     General: Skin is warm and dry.      Capillary Refill: Capillary refill takes less than 2 seconds.   Neurological:      General: No focal deficit present. "      Mental Status: Patient is alert.  Appropriate conversant     Sensory: Gross Sensation is intact.      Motor: Gross Motor function is intact symmetrically  Psychiatric:         Mood and Affect: Mood normal.      Cooperative, no apparent risk to self or others    ED COURSE        Work-up performed to evaluate for differential diagnosis as clinically indicated   Orders Placed This Encounter   Procedures    XR chest 1 view    CBC and Auto Differential    Comprehensive metabolic panel    Magnesium    Phosphorus    Lactate    Protime-INR    Troponin I Series, High Sensitivity (0, 1 HR)    B-Type Natriuretic Peptide    Influenza A, and B PCR    Sars-CoV-2 PCR    Troponin I, High Sensitivity, Initial    Vital Signs    Initiate droplet plus isolation    ECG 12 lead    Insert and maintain peripheral IV          Labs and imaging reviewed by me  and note         Labs Reviewed   CBC WITH AUTO DIFFERENTIAL - Abnormal       Result Value    WBC 1.7 (*)     nRBC 0.0      RBC 2.18 (*)     Hemoglobin 5.9 (*)     Hematocrit 18.6 (*)     MCV 85      MCH 27.1      MCHC 31.7 (*)     RDW 20.4 (*)     Platelets 131 (*)     Immature Granulocytes %, Automated 2.4 (*)     Immature Granulocytes Absolute, Automated 0.04     COMPREHENSIVE METABOLIC PANEL - Abnormal    Glucose 131 (*)     Sodium 132 (*)     Potassium 4.0      Chloride 95 (*)     Bicarbonate 26      Anion Gap 15      Urea Nitrogen 15      Creatinine 0.94      eGFR 64      Calcium 8.7      Albumin 3.2 (*)     Alkaline Phosphatase 122      Total Protein 6.3 (*)     AST 10      Bilirubin, Total 0.3      ALT 8     MAGNESIUM - Abnormal    Magnesium 0.98 (*)    B-TYPE NATRIURETIC PEPTIDE - Abnormal     (*)     Narrative:        <100 pg/mL - Heart failure unlikely  100-299 pg/mL - Intermediate probability of acute heart                  failure exacerbation. Correlate with clinical                  context and patient history.    >=300 pg/mL - Heart Failure likely. Correlate  with clinical                  context and patient history.    BNP testing is performed using different testing methodology at [facility] than at other [facility] hospitals. Direct result comparisons should only be made within the same method.      BLOOD GAS VENOUS FULL PANEL - Abnormal    POCT pH, Venous 7.42      POCT pCO2, Venous 41      POCT pO2, Venous 49 (*)     POCT SO2, Venous 78 (*)     POCT Oxy Hemoglobin, Venous 75.9 (*)     POCT Hematocrit Calculated, Venous 17.0 (*)     POCT Sodium, Venous 132 (*)     POCT Potassium, Venous 4.1      POCT Chloride, Venous 99      POCT Ionized Calicum, Venous 1.20      POCT Glucose, Venous 134 (*)     POCT Lactate, Venous 2.1 (*)     POCT Base Excess, Venous 1.9      POCT HCO3 Calculated, Venous 26.6 (*)     POCT Hemoglobin, Venous 5.6 (*)     POCT Anion Gap, Venous 11.0      Patient Temperature 37.0      FiO2 38     POCT GLUCOSE - Abnormal    POCT Glucose 127 (*)    MANUAL DIFFERENTIAL - Abnormal    Neutrophils %, Manual 31.0      Bands %, Manual 2.0      Lymphocytes %, Manual 56.0      Monocytes %, Manual 6.0      Eosinophils %, Manual 2.0      Basophils %, Manual 1.0      Metamyelocytes %, Manual 2.0      Seg Neutrophils Absolute, Manual 0.53 (*)     Bands Absolute, Manual 0.03      Lymphocytes Absolute, Manual 0.95      Monocytes Absolute, Manual 0.10      Eosinophils Absolute, Manual 0.03      Basophils Absolute, Manual 0.02      Metamyelocytes Absolute, Manual 0.03      Total Cells Counted 100      Neutrophils Absolute, Manual 0.56 (*)     RBC Morphology See Below      Polychromasia Mild      Hypochromia Mild      Ovalocytes Few      Toxic Granulation Present     PHOSPHORUS - Normal    Phosphorus 3.3     LACTATE - Normal    Lactate 2.0      Narrative:     Venipuncture immediately after or during the administration of Metamizole may lead to falsely low results. Testing should be performed immediately prior to Metamizole dosing.   [facility]INR - Normal     [facility]      INR 1.1     INFLUENZA A AND B PCR - Normal    Flu A Result Not Detected      Flu B Result Not Detected      Narrative:     This assay is an in vitro diagnostic multiplex nucleic acid amplification test for the detection and discrimination of Influenza A & B from nasopharyngeal specimens, and has been validated for use at [facility]. Negative results do not preclude Influenza A/B infections, and should not be used as the sole basis for diagnosis, treatment, or other management decisions. If Influenza A/B and RSV PCR results are negative, testing for Parainfluenza virus, Adenovirus and Metapneumovirus is routinely performed for [facility] pediatric oncology and intensive care inpatients, and is available on other patients by placing an add-on request.   SARS-COV-2 PCR - Normal    Coronavirus 2019, PCR Not Detected      Narrative:     This assay is an FDA-cleared, in vitro diagnostic nucleic acid amplification test for the qualitative detection and differentiation of SARS CoV-2 from nasopharyngeal specimens collected from individuals with signs and symptoms of respiratory tract infections, and has been validated for use at [facility]. Negative results do not preclude COVID-19 infections and should not be used as the sole basis for diagnosis, treatment, or other management decisions. Testing for SARS CoV-2 is recommended only for patients who meet current clinical and/or epidemiological criteria defined by federal, state, or local public health directives.   SERIAL TROPONIN-INITIAL - Normal    Troponin I, High Sensitivity 12      Narrative:     Less than 99th percentile of normal range cutoff-  Female and children under 18 years old <14 ng/L; Male <21 ng/L: Negative  Repeat testing should be performed if clinically indicated.     Female and children under 18 years old 14-50 ng/L; Male 21-50 ng/L:  Consistent with possible cardiac damage and possible increased  clinical   risk. Serial measurements may help to assess extent of myocardial damage.     >50 ng/L: Consistent with cardiac damage, increased clinical risk and  myocardial infarction. Serial measurements may help assess extent of   myocardial damage.      NOTE: Children less than 1 year old may have higher baseline troponin   levels and results should be interpreted in conjunction with the overall   clinical context.     NOTE: Troponin I testing is performed using a different   testing methodology at [facility] than at other   [facility]. Direct result comparisons should only   be made within the same method.   SERIAL TROPONIN, 1 HOUR - Normal    Troponin I, High Sensitivity 12      Narrative:     Less than 99th percentile of normal range cutoff-  Female and children under 18 years old <14 ng/L; Male <21 ng/L: Negative  Repeat testing should be performed if clinically indicated.     Female and children under 18 years old 14-50 ng/L; Male 21-50 ng/L:  Consistent with possible cardiac damage and possible increased clinical   risk. Serial measurements may help to assess extent of myocardial damage.     >50 ng/L: Consistent with cardiac damage, increased clinical risk and  myocardial infarction. Serial measurements may help assess extent of   myocardial damage.      NOTE: Children less than 1 year old may have higher baseline troponin   levels and results should be interpreted in conjunction with the overall   clinical context.     NOTE: Troponin I testing is performed using a different   testing methodology at [facility] than at other   [facility]. Direct result comparisons should only   be made within the same method.   BLOOD GAS LACTIC ACID, VENOUS - Normal    POCT Lactate, Venous 1.8     TROPONIN SERIES- (INITIAL, 1 HR)    Narrative:     The following orders were created for panel order Troponin I Series, High Sensitivity (0, 1 HR).  Procedure                                Abnormality         Status                     ---------                               -----------         ------                     Troponin I, High Sensiti...[[id number]]  Normal              Final result               Troponin, High Sensitivi...[[id number]]  Normal              Final result                 Please view results for these tests on the individual orders.   TYPE AND SCREEN    ABO TYPE A      Rh TYPE POS      ANTIBODY SCREEN NEG     VERAB/VERIFY ABORH    ABO TYPE A      Rh TYPE POS     PREPARE RBC    PRODUCT CODE H7290S51      Unit Number [id number]      Unit ABO O      Unit RH POS      XM INTEP COMP      Dispense Status IS      Blood Expiration Date 2/19/2025 11:59:00 PM EST      PRODUCT BLOOD TYPE 5100      UNIT VOLUME 350      PRODUCT CODE L3129J77      Unit Number [id number]      Unit ABO A      Unit RH NEG      XM INTEP COMP      Dispense Status XM      Blood Expiration Date 2/17/2025 11:59:00 PM EST      PRODUCT BLOOD TYPE 0600      UNIT VOLUME 350          Vascular US lower extremity venous duplex bilateral   Final Result   Suboptimal visualization of the calf veins. No sonographic evidence   DVT in the visualized vessels of bilateral lower extremities.        MACRO:   None        Signed by: Garett Burgess 1/29/2025 7:00 PM   Dictation workstation:   [id number]      CT angio chest for pulmonary embolism   Final Result   1. Tiny pulmonary embolism in a right lower lobe segmental pulmonary   artery and possible punctate pulmonary embolism versus artifact in a   left lower lobe distal segmental pulmonary artery. Cardiomegaly is   noted, however there is no evidence of right heart strain.   2. Partially loculated small left pleural effusion with pleural   catheter in place. Adjacent patchy enhancing consolidation in   bandlike opacities in the lingula and left lower lobe are consistent   with compressive atelectasis.   3. Interlobular septal thickening and confluent ground-glass    opacification in the left upper lobe. This may be due to compressive   atelectasis, atypical infection or lymphangitic spread of malignancy   given the patient's history (recent malignant cytology found on   pleural fluid analysis).   4. Peripheral 11 mm irregular nodular opacity in the left upper lobe   is nonspecific and may be due to a nodular area of atelectasis or   malignancy.   5. Numerous tiny 2-3 mm pulmonary nodules scattered throughout both   lungs, greatest in the left upper lobe. These may be metastatic or   infectious/inflammatory.   6. Mediastinal and right hilar adenopathy, which may be metastatic or   reactive.   7. Recommend short interval follow-up CT chest to reassess the above   findings.   8. Enlarged heterogeneous thyroid with bilateral thyroid nodules.   This can be further assessed with outpatient thyroid ultrasound.   9. Small hiatal hernia. Please see separate CT abdomen and pelvis   report for abdominal findings.        MACRO:   Dr. Krystyna Huerta discussed the significance and urgency of this   critical finding by telephone with Dr. MARTY LEJEUNE on 1/29/2025 at   6:22 pm.  (**-RCF-**) Findings:  See findings.        Signed by: Krystyna Huerta 1/29/2025 6:29 PM   Dictation workstation:   [id number]      CT abdomen pelvis w IV contrast   Final Result   1.  No evidence of acute abnormality in the abdomen or pelvis.   2. Multiple small hypodense liver lesions are too small to   characterize, but suspicious for liver metastases given the patient's   history. Nonemergent liver MRI may be considered for further   evaluation, as clinically warranted.   3. Small sliding hiatal hernia.   4. Tiny 3 mm intrarenal stone in the right mid kidney.   5. Tiny probable renal cysts in the left upper pole.   6. Prior cholecystectomy and hysterectomy.             MACRO:   None        Signed by: Krystyna Huerta 1/29/2025 6:39 PM   Dictation workstation:   [id number]      XR chest 1 view   Final Result   No  change compared to prior exam. Left lung consolidation with   suspected underlying pleural effusion persists.        MACRO:   None        Signed by: Matshima Chin 1/29/2025 2:59 PM   Dictation workstation:   [id number]               Pt course which Intervention and treatment included :    Procedure  Critical Care    Performed by: Marty R Lejeune, DO  Authorized by: Marty R Lejeune, DO    Critical care provider statement:     Critical care time (minutes):  32    Critical care time was exclusive of:  Separately billable procedures and treating other patients    Critical care was necessary to treat or prevent imminent or life-threatening deterioration of the following conditions:  Circulatory failure    Critical care was time spent personally by me on the following activities:  Ordering and performing treatments and interventions, ordering and review of radiographic studies, ordering and review of laboratory studies, pulse oximetry, re-evaluation of patient's condition, review of old charts, interpretation of cardiac output measurements, obtaining history from patient or surrogate, evaluation of patient's response to treatment, discussions with primary provider and development of treatment plan with patient or surrogate    Care discussed with: admitting provider        Medications   magnesium sulfate 4 g in sterile water for injection 100 mL (4 g intravenous New Bag 1/29/25 1644)   HYDROmorphone (Dilaudid) tablet 2 mg (has no administration in time range)   HYDROmorphone (Dilaudid) injection 0.4 mg (has no administration in time range)   ondansetron (Zofran) injection 4 mg (4 mg intravenous Given 1/29/25 1423)   acetaminophen (Tylenol) tablet 975 mg (975 mg oral Given 1/29/25 1423)   morphine injection 4 mg (4 mg intravenous Given 1/29/25 1424)   ipratropium-albuteroL (Duo-Neb) 0.5-2.5 mg/3 mL nebulizer solution 3 mL (3 mL nebulization Given 1/29/25 1447)   magnesium oxide (Mag-Ox) tablet 800 mg (800 mg oral Given  1/29/25 1644)   iohexol (OMNIPaque) 350 mg iodine/mL solution 75 mL (75 mL intravenous Given 1/29/25 1732)   HYDROmorphone (Dilaudid) injection 0.5 mg (0.5 mg intravenous Given 1/29/25 1953)   furosemide (Lasix) injection 60 mg (60 mg intravenous Given 1/29/25 1953)        ED Course as of 01/29/25 1959 Wed Jan 29, 2025   1405 Patient is seen and examined.  She does have tachypnea on exam.  Will give DuoNeb, in addition to sepsis workup given elevated respirations [ML]   1621 Patient noted to have significant low hemoglobin.  Transfusion ordered.  BUN is normal, do not suspect GI bleed.  Magnesium replacement ordered.  Patient may have pancytopenia from chemotherapy. [ML]   1958 Patient noted to have evidence of PEs as well as findings of pneumonia.  IV antibiotics administered.  Will give dose of Lasix between first and second unit of blood.  In addition given patient's severe anemia will withhold heparin at this time, once transfusion is completed and discussed with the hospitalist about starting heparin.  Patient will be admitted for further management. [ML]      ED Course User Index  [ML] Marty R Lejeune, DO         Diagnoses as of 01/29/25 1959   COPD exacerbation (Multi)   Pulmonary embolism, other, unspecified chronicity, unspecified whether acute cor pulmonale present (Multi)   Anemia requiring transfusions          DISPOSITION: Admit to hospital.  All imaging and laboratory results were discussed with the patient in detail including acute as well as incidental findings.  I discussed the differential, results and treatment plan with the patient and/or family/friend/caregiver if present.  I discussed the reason and need for admission to the hospital as well as risk and benefits.  Patient/family/caregiver/friend is in agreement with transfer as well as choice of facility to be transferred to.  Education and reassurance provided regards to presumed diagnosis was given.  Patient/family/caregiver understand and  all questions answered.      1. COPD exacerbation (Multi)        2. Pulmonary embolism, other, unspecified chronicity, unspecified whether acute cor pulmonale present (Multi)        3. Anemia requiring transfusions           -------------------------------------------------------------------    01/29/25 at 2:04 PM - Marty R LeJeune, DO   Internal & Emergency Medicine          Marty R Lejeune, DO  Resident  01/29/25 1959

## 2025-01-30 ENCOUNTER — APPOINTMENT (OUTPATIENT)
Dept: CARDIOLOGY | Facility: HOSPITAL | Age: 75
End: 2025-01-30
Payer: MEDICARE

## 2025-01-30 LAB
ATRIAL RATE: 0 BPM
BLOOD EXPIRATION DATE: NORMAL
BLOOD EXPIRATION DATE: NORMAL
DISPENSE STATUS: NORMAL
DISPENSE STATUS: NORMAL
EJECTION FRACTION APICAL 4 CHAMBER: 78.7
EJECTION FRACTION: 63 %
ERYTHROCYTE [DISTWIDTH] IN BLOOD BY AUTOMATED COUNT: 18.4 % (ref 11.5–14.5)
GLUCOSE BLD MANUAL STRIP-MCNC: 231 MG/DL (ref 74–99)
HCT VFR BLD AUTO: 21.4 % (ref 36–46)
HGB BLD-MCNC: 7.3 G/DL (ref 12–16)
LEFT ATRIUM VOLUME AREA LENGTH INDEX BSA: 39.5 ML/M2
LEFT VENTRICLE INTERNAL DIMENSION DIASTOLE: 4.5 CM (ref 3.5–6)
LEFT VENTRICULAR OUTFLOW TRACT DIAMETER: 2.2 CM
LV EJECTION FRACTION BIPLANE: 77 %
MAGNESIUM SERPL-MCNC: 1.72 MG/DL (ref 1.6–2.4)
MCH RBC QN AUTO: 28.7 PG (ref 26–34)
MCHC RBC AUTO-ENTMCNC: 34.1 G/DL (ref 32–36)
MCV RBC AUTO: 84 FL (ref 80–100)
MITRAL VALVE E/A RATIO: 2.16
NRBC BLD-RTO: 0 /100 WBCS (ref 0–0)
P AXIS: 0 DEGREES
PLATELET # BLD AUTO: 118 X10*3/UL (ref 150–450)
PR INTERVAL: 143 MS
PRODUCT BLOOD TYPE: 5100
PRODUCT BLOOD TYPE: 600
PRODUCT CODE: NORMAL
PRODUCT CODE: NORMAL
Q ONSET: 251 MS
QRS COUNT: 10 BEATS
QRS DURATION: 102 MS
QT INTERVAL: 419 MS
QTC CALCULATION(BAZETT): 415 MS
QTC FREDERICIA: 416 MS
R AXIS: 12 DEGREES
RBC # BLD AUTO: 2.54 X10*6/UL (ref 4–5.2)
RIGHT VENTRICLE FREE WALL PEAK S': 14.4 CM/S
T AXIS: 87 DEGREES
T OFFSET: 460 MS
TRICUSPID ANNULAR PLANE SYSTOLIC EXCURSION: 2.7 CM
UFH PPP CHRO-ACNC: 0.3 IU/ML
UFH PPP CHRO-ACNC: 0.4 IU/ML
UFH PPP CHRO-ACNC: 0.8 IU/ML
UNIT ABO: NORMAL
UNIT ABO: NORMAL
UNIT NUMBER: NORMAL
UNIT NUMBER: NORMAL
UNIT RH: NORMAL
UNIT RH: NORMAL
UNIT VOLUME: 350
UNIT VOLUME: 350
VENTRICULAR RATE: 59 BPM
WBC # BLD AUTO: 2 X10*3/UL (ref 4.4–11.3)
XM INTEP: NORMAL
XM INTEP: NORMAL

## 2025-01-30 PROCEDURE — 36415 COLL VENOUS BLD VENIPUNCTURE: CPT | Performed by: INTERNAL MEDICINE

## 2025-01-30 PROCEDURE — 83735 ASSAY OF MAGNESIUM: CPT | Performed by: INTERNAL MEDICINE

## 2025-01-30 PROCEDURE — 2500000002 HC RX 250 W HCPCS SELF ADMINISTERED DRUGS (ALT 637 FOR MEDICARE OP, ALT 636 FOR OP/ED): Performed by: REGISTERED NURSE

## 2025-01-30 PROCEDURE — 36430 TRANSFUSION BLD/BLD COMPNT: CPT

## 2025-01-30 PROCEDURE — 2500000001 HC RX 250 WO HCPCS SELF ADMINISTERED DRUGS (ALT 637 FOR MEDICARE OP): Performed by: REGISTERED NURSE

## 2025-01-30 PROCEDURE — 2500000004 HC RX 250 GENERAL PHARMACY W/ HCPCS (ALT 636 FOR OP/ED): Mod: JZ | Performed by: INTERNAL MEDICINE

## 2025-01-30 PROCEDURE — 85520 HEPARIN ASSAY: CPT | Performed by: INTERNAL MEDICINE

## 2025-01-30 PROCEDURE — 1200000002 HC GENERAL ROOM WITH TELEMETRY DAILY

## 2025-01-30 PROCEDURE — 99221 1ST HOSP IP/OBS SF/LOW 40: CPT | Performed by: INTERNAL MEDICINE

## 2025-01-30 PROCEDURE — 82947 ASSAY GLUCOSE BLOOD QUANT: CPT

## 2025-01-30 PROCEDURE — 93306 TTE W/DOPPLER COMPLETE: CPT | Performed by: INTERNAL MEDICINE

## 2025-01-30 PROCEDURE — 2500000004 HC RX 250 GENERAL PHARMACY W/ HCPCS (ALT 636 FOR OP/ED): Performed by: INTERNAL MEDICINE

## 2025-01-30 PROCEDURE — 2500000004 HC RX 250 GENERAL PHARMACY W/ HCPCS (ALT 636 FOR OP/ED): Performed by: REGISTERED NURSE

## 2025-01-30 PROCEDURE — C8929 TTE W OR WO FOL WCON,DOPPLER: HCPCS

## 2025-01-30 PROCEDURE — 85027 COMPLETE CBC AUTOMATED: CPT | Performed by: INTERNAL MEDICINE

## 2025-01-30 PROCEDURE — 99232 SBSQ HOSP IP/OBS MODERATE 35: CPT | Performed by: PHYSICIAN ASSISTANT

## 2025-01-30 PROCEDURE — 2500000001 HC RX 250 WO HCPCS SELF ADMINISTERED DRUGS (ALT 637 FOR MEDICARE OP): Performed by: INTERNAL MEDICINE

## 2025-01-30 PROCEDURE — P9040 RBC LEUKOREDUCED IRRADIATED: HCPCS

## 2025-01-30 RX ORDER — AMLODIPINE BESYLATE 10 MG/1
10 TABLET ORAL DAILY
Status: DISCONTINUED | OUTPATIENT
Start: 2025-01-30 | End: 2025-02-01 | Stop reason: HOSPADM

## 2025-01-30 RX ORDER — LABETALOL 200 MG/1
1 TABLET, FILM COATED ORAL
Status: DISCONTINUED | OUTPATIENT
Start: 2025-01-30 | End: 2025-02-01 | Stop reason: HOSPADM

## 2025-01-30 RX ORDER — ACETAMINOPHEN 160 MG/5ML
650 SOLUTION ORAL EVERY 4 HOURS PRN
Status: DISCONTINUED | OUTPATIENT
Start: 2025-01-30 | End: 2025-02-01 | Stop reason: HOSPADM

## 2025-01-30 RX ORDER — PANTOPRAZOLE SODIUM 40 MG/1
40 TABLET, DELAYED RELEASE ORAL
Status: DISCONTINUED | OUTPATIENT
Start: 2025-01-30 | End: 2025-02-01 | Stop reason: HOSPADM

## 2025-01-30 RX ORDER — FUROSEMIDE 10 MG/ML
40 INJECTION INTRAMUSCULAR; INTRAVENOUS EVERY 12 HOURS
Status: DISCONTINUED | OUTPATIENT
Start: 2025-01-30 | End: 2025-02-01 | Stop reason: HOSPADM

## 2025-01-30 RX ORDER — INSULIN GLARGINE 100 [IU]/ML
30 INJECTION, SOLUTION SUBCUTANEOUS EVERY 24 HOURS
Status: DISCONTINUED | OUTPATIENT
Start: 2025-01-30 | End: 2025-02-01 | Stop reason: HOSPADM

## 2025-01-30 RX ORDER — GUAIFENESIN 600 MG/1
600 TABLET, EXTENDED RELEASE ORAL EVERY 12 HOURS PRN
Status: DISCONTINUED | OUTPATIENT
Start: 2025-01-30 | End: 2025-02-01 | Stop reason: HOSPADM

## 2025-01-30 RX ORDER — POLYETHYLENE GLYCOL 3350 17 G/17G
17 POWDER, FOR SOLUTION ORAL DAILY PRN
Status: DISCONTINUED | OUTPATIENT
Start: 2025-01-30 | End: 2025-02-01 | Stop reason: HOSPADM

## 2025-01-30 RX ORDER — ALUMINUM HYDROXIDE, MAGNESIUM HYDROXIDE, AND SIMETHICONE 1200; 120; 1200 MG/30ML; MG/30ML; MG/30ML
30 SUSPENSION ORAL EVERY 6 HOURS PRN
Status: DISCONTINUED | OUTPATIENT
Start: 2025-01-30 | End: 2025-02-01 | Stop reason: HOSPADM

## 2025-01-30 RX ORDER — ONDANSETRON HYDROCHLORIDE 2 MG/ML
4 INJECTION, SOLUTION INTRAVENOUS EVERY 8 HOURS PRN
Status: DISCONTINUED | OUTPATIENT
Start: 2025-01-30 | End: 2025-02-01 | Stop reason: HOSPADM

## 2025-01-30 RX ORDER — DOCUSATE SODIUM 100 MG/1
100 CAPSULE, LIQUID FILLED ORAL DAILY
Status: DISCONTINUED | OUTPATIENT
Start: 2025-01-30 | End: 2025-02-01 | Stop reason: HOSPADM

## 2025-01-30 RX ORDER — TALC
3 POWDER (GRAM) TOPICAL NIGHTLY PRN
Status: DISCONTINUED | OUTPATIENT
Start: 2025-01-30 | End: 2025-02-01 | Stop reason: HOSPADM

## 2025-01-30 RX ORDER — ACETAMINOPHEN 650 MG/1
650 SUPPOSITORY RECTAL EVERY 4 HOURS PRN
Status: DISCONTINUED | OUTPATIENT
Start: 2025-01-30 | End: 2025-02-01 | Stop reason: HOSPADM

## 2025-01-30 RX ORDER — ACETAMINOPHEN 325 MG/1
650 TABLET ORAL EVERY 4 HOURS PRN
Status: DISCONTINUED | OUTPATIENT
Start: 2025-01-30 | End: 2025-02-01 | Stop reason: HOSPADM

## 2025-01-30 RX ORDER — GUAIFENESIN/DEXTROMETHORPHAN 100-10MG/5
5 SYRUP ORAL EVERY 4 HOURS PRN
Status: DISCONTINUED | OUTPATIENT
Start: 2025-01-30 | End: 2025-02-01 | Stop reason: HOSPADM

## 2025-01-30 RX ORDER — LOSARTAN POTASSIUM 25 MG/1
25 TABLET ORAL DAILY
Status: DISCONTINUED | OUTPATIENT
Start: 2025-01-30 | End: 2025-02-01 | Stop reason: HOSPADM

## 2025-01-30 RX ORDER — HEPARIN SODIUM 10000 [USP'U]/100ML
0-4500 INJECTION, SOLUTION INTRAVENOUS CONTINUOUS
Status: DISCONTINUED | OUTPATIENT
Start: 2025-01-30 | End: 2025-01-31

## 2025-01-30 RX ORDER — INSULIN GLARGINE 100 [IU]/ML
30 INJECTION, SOLUTION SUBCUTANEOUS NIGHTLY
Status: DISCONTINUED | OUTPATIENT
Start: 2025-01-30 | End: 2025-01-30

## 2025-01-30 RX ORDER — POLYETHYLENE GLYCOL 3350 17 G/17G
17 POWDER, FOR SOLUTION ORAL 2 TIMES DAILY PRN
Status: DISCONTINUED | OUTPATIENT
Start: 2025-01-30 | End: 2025-02-01 | Stop reason: HOSPADM

## 2025-01-30 RX ORDER — ONDANSETRON HYDROCHLORIDE 2 MG/ML
4 INJECTION, SOLUTION INTRAVENOUS EVERY 6 HOURS PRN
Status: DISCONTINUED | OUTPATIENT
Start: 2025-01-30 | End: 2025-02-01 | Stop reason: HOSPADM

## 2025-01-30 RX ORDER — ASPIRIN 81 MG/1
81 TABLET ORAL DAILY
Status: DISCONTINUED | OUTPATIENT
Start: 2025-01-30 | End: 2025-02-01 | Stop reason: HOSPADM

## 2025-01-30 RX ORDER — ONDANSETRON 4 MG/1
4 TABLET, FILM COATED ORAL EVERY 8 HOURS PRN
Status: DISCONTINUED | OUTPATIENT
Start: 2025-01-30 | End: 2025-02-01 | Stop reason: HOSPADM

## 2025-01-30 RX ADMIN — AMLODIPINE BESYLATE 10 MG: 10 TABLET ORAL at 09:29

## 2025-01-30 RX ADMIN — ONDANSETRON 4 MG: 2 INJECTION INTRAMUSCULAR; INTRAVENOUS at 01:11

## 2025-01-30 RX ADMIN — HYDROMORPHONE HYDROCHLORIDE 2 MG: 2 TABLET ORAL at 20:54

## 2025-01-30 RX ADMIN — INSULIN LISPRO 2 UNITS: 100 INJECTION, SOLUTION INTRAVENOUS; SUBCUTANEOUS at 11:48

## 2025-01-30 RX ADMIN — LABETALOL HYDROCHLORIDE 200 MG: 200 TABLET, FILM COATED ORAL at 18:08

## 2025-01-30 RX ADMIN — HEPARIN SODIUM 100 UNITS/HR: 10000 INJECTION, SOLUTION INTRAVENOUS at 16:34

## 2025-01-30 RX ADMIN — PANTOPRAZOLE SODIUM 40 MG: 40 TABLET, DELAYED RELEASE ORAL at 07:12

## 2025-01-30 RX ADMIN — ONDANSETRON 4 MG: 2 INJECTION INTRAMUSCULAR; INTRAVENOUS at 09:34

## 2025-01-30 RX ADMIN — INSULIN LISPRO 2 UNITS: 100 INJECTION, SOLUTION INTRAVENOUS; SUBCUTANEOUS at 17:23

## 2025-01-30 RX ADMIN — LOSARTAN POTASSIUM 25 MG: 25 TABLET, FILM COATED ORAL at 09:29

## 2025-01-30 RX ADMIN — HYDROMORPHONE HYDROCHLORIDE 2 MG: 2 TABLET ORAL at 01:11

## 2025-01-30 RX ADMIN — HUMAN ALBUMIN MICROSPHERES AND PERFLUTREN 0.5 ML: 10; .22 INJECTION, SOLUTION INTRAVENOUS at 13:40

## 2025-01-30 RX ADMIN — LABETALOL HYDROCHLORIDE 200 MG: 200 TABLET, FILM COATED ORAL at 07:11

## 2025-01-30 RX ADMIN — INSULIN LISPRO 4 UNITS: 100 INJECTION, SOLUTION INTRAVENOUS; SUBCUTANEOUS at 09:29

## 2025-01-30 RX ADMIN — HYDROMORPHONE HYDROCHLORIDE 2 MG: 2 TABLET ORAL at 09:35

## 2025-01-30 RX ADMIN — FUROSEMIDE 40 MG: 10 INJECTION, SOLUTION INTRAMUSCULAR; INTRAVENOUS at 18:39

## 2025-01-30 RX ADMIN — ASPIRIN 81 MG: 81 TABLET, COATED ORAL at 09:29

## 2025-01-30 RX ADMIN — ONDANSETRON 4 MG: 2 INJECTION INTRAMUSCULAR; INTRAVENOUS at 09:33

## 2025-01-30 RX ADMIN — FILGRASTIM-SNDZ 300 MCG: 300 INJECTION, SOLUTION INTRAVENOUS; SUBCUTANEOUS at 17:22

## 2025-01-30 RX ADMIN — ONDANSETRON 4 MG: 2 INJECTION INTRAMUSCULAR; INTRAVENOUS at 20:54

## 2025-01-30 RX ADMIN — FUROSEMIDE 40 MG: 10 INJECTION, SOLUTION INTRAMUSCULAR; INTRAVENOUS at 06:00

## 2025-01-30 RX ADMIN — HEPARIN SODIUM 1800 UNITS/HR: 10000 INJECTION, SOLUTION INTRAVENOUS at 05:47

## 2025-01-30 RX ADMIN — ALUMINUM HYDROXIDE, MAGNESIUM HYDROXIDE, AND DIMETHICONE 20 ML: 200; 20; 200 SUSPENSION ORAL at 22:35

## 2025-01-30 ASSESSMENT — COGNITIVE AND FUNCTIONAL STATUS - GENERAL
TOILETING: A LITTLE
HELP NEEDED FOR BATHING: A LITTLE
WALKING IN HOSPITAL ROOM: A LITTLE
CLIMB 3 TO 5 STEPS WITH RAILING: A LITTLE
MOBILITY SCORE: 22
MOBILITY SCORE: 22
WALKING IN HOSPITAL ROOM: A LITTLE
DAILY ACTIVITIY SCORE: 21
DRESSING REGULAR LOWER BODY CLOTHING: A LITTLE
DRESSING REGULAR LOWER BODY CLOTHING: A LITTLE
DAILY ACTIVITIY SCORE: 21
TOILETING: A LITTLE
CLIMB 3 TO 5 STEPS WITH RAILING: A LITTLE
HELP NEEDED FOR BATHING: A LITTLE

## 2025-01-30 ASSESSMENT — PAIN SCALES - GENERAL
PAINLEVEL_OUTOF10: 5 - MODERATE PAIN
PAINLEVEL_OUTOF10: 7
PAINLEVEL_OUTOF10: 0 - NO PAIN

## 2025-01-30 ASSESSMENT — PAIN DESCRIPTION - ORIENTATION
ORIENTATION: LEFT
ORIENTATION: RIGHT;LEFT

## 2025-01-30 ASSESSMENT — PAIN DESCRIPTION - LOCATION
LOCATION: BACK
LOCATION: BACK

## 2025-01-30 ASSESSMENT — PAIN - FUNCTIONAL ASSESSMENT
PAIN_FUNCTIONAL_ASSESSMENT: 0-10

## 2025-01-30 NOTE — CARE PLAN
Problem: Pain - Adult  Goal: Verbalizes/displays adequate comfort level or baseline comfort level  Outcome: Progressing     Problem: Safety - Adult  Goal: Free from fall injury  Outcome: Progressing     Problem: Discharge Planning  Goal: Discharge to home or other facility with appropriate resources  Outcome: Progressing     Problem: Chronic Conditions and Co-morbidities  Goal: Patient's chronic conditions and co-morbidity symptoms are monitored and maintained or improved  Outcome: Progressing     Problem: Nutrition  Goal: Nutrient intake appropriate for maintaining nutritional needs  Outcome: Progressing     Problem: Skin  Goal: Decreased wound size/increased tissue granulation at next dressing change  Outcome: Progressing  Flowsheets (Taken 1/29/2025 2312)  Decreased wound size/increased tissue granulation at next dressing change:   Promote sleep for wound healing   Protective dressings over bony prominences  Goal: Participates in plan/prevention/treatment measures  Outcome: Progressing  Flowsheets (Taken 1/29/2025 2312)  Participates in plan/prevention/treatment measures:   Discuss with provider PT/OT consult   Elevate heels   Increase activity/out of bed for meals  Goal: Prevent/manage excess moisture  Outcome: Progressing  Flowsheets (Taken 1/29/2025 2312)  Prevent/manage excess moisture:   Monitor for/manage infection if present   Cleanse incontinence/protect with barrier cream  Goal: Prevent/minimize sheer/friction injuries  Outcome: Progressing  Flowsheets (Taken 1/29/2025 2312)  Prevent/minimize sheer/friction injuries: Increase activity/out of bed for meals  Goal: Promote/optimize nutrition  Outcome: Progressing  Flowsheets (Taken 1/29/2025 2312)  Promote/optimize nutrition: Offer water/supplements/favorite foods  Goal: Promote skin healing  Outcome: Progressing  Flowsheets (Taken 1/29/2025 2312)  Promote skin healing:   Assess skin/pad under line(s)/device(s)   Protective dressings over bony  prominences     Problem: Fall/Injury  Goal: Not fall by end of shift  Outcome: Progressing  Goal: Be free from injury by end of the shift  Outcome: Progressing  Goal: Verbalize understanding of personal risk factors for fall in the hospital  Outcome: Progressing  Goal: Verbalize understanding of risk factor reduction measures to prevent injury from fall in the home  Outcome: Progressing  Goal: Use assistive devices by end of the shift  Outcome: Progressing  Goal: Pace activities to prevent fatigue by end of the shift  Outcome: Progressing     Problem: Pain  Goal: Takes deep breaths with improved pain control throughout the shift  Outcome: Progressing  Goal: Turns in bed with improved pain control throughout the shift  Outcome: Progressing  Goal: Walks with improved pain control throughout the shift  Outcome: Progressing  Goal: Performs ADL's with improved pain control throughout shift  Outcome: Progressing  Goal: Participates in PT with improved pain control throughout the shift  Outcome: Progressing  Goal: Free from opioid side effects throughout the shift  Outcome: Progressing  Goal: Free from acute confusion related to pain meds throughout the shift  Outcome: Progressing

## 2025-01-30 NOTE — CARE PLAN
Problem: Safety - Adult  Goal: Free from fall injury  Outcome: Progressing     Problem: Skin  Goal: Decreased wound size/increased tissue granulation at next dressing change  Outcome: Progressing  Flowsheets (Taken 1/30/2025 1347)  Decreased wound size/increased tissue granulation at next dressing change:   Promote sleep for wound healing   Protective dressings over bony prominences  Goal: Participates in plan/prevention/treatment measures  Outcome: Progressing  Flowsheets (Taken 1/30/2025 1347)  Participates in plan/prevention/treatment measures:   Discuss with provider PT/OT consult   Elevate heels   Increase activity/out of bed for meals  Goal: Prevent/manage excess moisture  Outcome: Progressing  Flowsheets (Taken 1/30/2025 1347)  Prevent/manage excess moisture:   Cleanse incontinence/protect with barrier cream   Moisturize dry skin   Follow provider orders for dressing changes   Monitor for/manage infection if present  Goal: Prevent/minimize sheer/friction injuries  Outcome: Progressing  Flowsheets (Taken 1/30/2025 1347)  Prevent/minimize sheer/friction injuries:   HOB 30 degrees or less   Increase activity/out of bed for meals   Turn/reposition every 2 hours/use positioning/transfer devices   Use pull sheet  Goal: Promote/optimize nutrition  Outcome: Progressing  Flowsheets (Taken 1/30/2025 1347)  Promote/optimize nutrition:   Offer water/supplements/favorite foods   Consume > 50% meals/supplements   Monitor/record intake including meals  Goal: Promote skin healing  Outcome: Progressing  Flowsheets (Taken 1/30/2025 1347)  Promote skin healing:   Ensure correct size (line/device) and apply per  instructions   Assess skin/pad under line(s)/device(s)   Protective dressings over bony prominences   Rotate device position/do not position patient on device   Turn/reposition every 2 hours/use positioning/transfer devices       The clinical goals for the shift include Pt will remain free from fall or  injury through end of this shift.  No fall or injury this shift. Current safety interventions continue, All needs met this shift. No new skin breakdown this shift.

## 2025-01-30 NOTE — PROGRESS NOTES
"Internal Medicine Progress Note    Name: Alejandra Mccabeland  : 1950  Chief Complaint: Shortness of Breath, Leg Swelling, Dizziness, Nausea, and Vomiting (Pt has c/c of bilateral lower rib pain duration 2hrs and reported general weakness, SOB, dizziness, n/v, bilateral leg swelling duration 5 days. )  Primary Care Physician: Kevin Orantes MD  Admission date: 2025  Date of service: 2025     History of Present Illness  2025: Patient seen this morning sitting up in bed. She is awake and alert, in no acute respiratory distress on her baseline 4L NC. States that she is feeling improved today, SOB improving and feels like she has more energy. Denies any evidence of bleeding. Continues on heparin gtt this morning. Denies any other complaints or concerns at this time.     Allergies  Allergies   Allergen Reactions    Keflex [Cephalexin] Unknown    Metformin Diarrhea and Unknown     severe diarrhea    Chantix [Varenicline] Unknown    Cymbalta [Duloxetine] Unknown    Hydralazine Hives    Levofloxacin Unknown and Hives     IV Levaquin    Sulfasalazine Headache and Nausea/vomiting       Review of Systems:   12 point ROS reviewed and negative other than noted above    Objective  VITALS:  /50 (BP Location: Left arm, Patient Position: Lying)   Pulse 82   Temp 36.3 °C (97.4 °F) (Temporal)   Resp 18   Ht 1.702 m (5' 7\")   Wt 100 kg (220 lb 7.4 oz)   SpO2 97%   BMI 34.53 kg/m²     Physical Exam:   Constitutional: Obese, chronically ill appearing female in no acute respiratory distress. Sitting up in bed comfortably and talkative  Eyes: PERRL, EOMI  Head/Neck: Normocephalic, atraumatic. Neck supple, no thyromegaly, JVD. Trachea midline  Respiratory/Thorax: Normal respiratory effort. Lungs CTAB with no wheezing, rales, or rhonchi noted. Left pleurex catheter in place  Cardiovascular: RRR, no murmurs, rubs, or gallops noted. Peripheral pulses 2+  Gastrointestinal: Bowel sounds normal. Abdomen obese, " "soft, nontender, nondistended, with no palpable masses  Musculoskeletal: No gross deformities. No gross muscular weakness with no ROM limitation  Extremities: 1-2+ BLE edema  Neurological: Alert and oriented x3, CN II - VII grossly intact  Psychological: Appropriate mood and affect  Skin: No rashes or lesions noted.    Labs-   Lab Results   Component Value Date    WBC 2.0 (L) 01/30/2025    HGB 7.3 (L) 01/30/2025    HCT 21.4 (L) 01/30/2025     (L) 01/30/2025     (L) 01/29/2025    K 4.0 01/29/2025    CL 95 (L) 01/29/2025    CREATININE 0.94 01/29/2025    BUN 15 01/29/2025    CO2 26 01/29/2025    GLUCOSE 131 (H) 01/29/2025    ALT 8 01/29/2025    AST 10 01/29/2025    INR 1.1 01/29/2025    APTT 29 12/28/2024       No results found for: \"IRON\", \"FERRITIN\", \"TIBC\"    Lab Results   Component Value Date    TROPHS 12 01/29/2025    TROPHS 12 01/29/2025        Lab Results   Component Value Date    TSH 0.97 12/29/2024       Lab Results   Component Value Date    MG 1.72 01/30/2025    PHOS 3.3 01/29/2025         No results found for this or any previous visit from the past 1095 days.            Recent Radiological Studies:  Vascular US lower extremity venous duplex bilateral   Final Result   Suboptimal visualization of the calf veins. No sonographic evidence   DVT in the visualized vessels of bilateral lower extremities.        MACRO:   None        Signed by: Garett Burgess 1/29/2025 7:00 PM   Dictation workstation:   [id number]      CT angio chest for pulmonary embolism   Final Result   1. Tiny pulmonary embolism in a right lower lobe segmental pulmonary   artery and possible punctate pulmonary embolism versus artifact in a   left lower lobe distal segmental pulmonary artery. Cardiomegaly is   noted, however there is no evidence of right heart strain.   2. Partially loculated small left pleural effusion with pleural   catheter in place. Adjacent patchy enhancing consolidation in   bandlike opacities in the lingula " and left lower lobe are consistent   with compressive atelectasis.   3. Interlobular septal thickening and confluent ground-glass   opacification in the left upper lobe. This may be due to compressive   atelectasis, atypical infection or lymphangitic spread of malignancy   given the patient's history (recent malignant cytology found on   pleural fluid analysis).   4. Peripheral 11 mm irregular nodular opacity in the left upper lobe   is nonspecific and may be due to a nodular area of atelectasis or   malignancy.   5. Numerous tiny 2-3 mm pulmonary nodules scattered throughout both   lungs, greatest in the left upper lobe. These may be metastatic or   infectious/inflammatory.   6. Mediastinal and right hilar adenopathy, which may be metastatic or   reactive.   7. Recommend short interval follow-up CT chest to reassess the above   findings.   8. Enlarged heterogeneous thyroid with bilateral thyroid nodules.   This can be further assessed with outpatient thyroid ultrasound.   9. Small hiatal hernia. Please see separate CT abdomen and pelvis   report for abdominal findings.        MACRO:   Dr. Krystyna Huerta discussed the significance and urgency of this   critical finding by telephone with Dr. MARTY LEJEUNE on 1/29/2025 at   6:22 pm.  (**-RCF-**) Findings:  See findings.        Signed by: Krystyna Huerta 1/29/2025 6:29 PM   Dictation workstation:   [id number]      CT abdomen pelvis w IV contrast   Final Result   1.  No evidence of acute abnormality in the abdomen or pelvis.   2. Multiple small hypodense liver lesions are too small to   characterize, but suspicious for liver metastases given the patient's   history. Nonemergent liver MRI may be considered for further   evaluation, as clinically warranted.   3. Small sliding hiatal hernia.   4. Tiny 3 mm intrarenal stone in the right mid kidney.   5. Tiny probable renal cysts in the left upper pole.   6. Prior cholecystectomy and hysterectomy.             MACRO:   None         Signed by: Krystyna Huerta 1/29/2025 6:39 PM   Dictation workstation:   [id number]      XR chest 1 view   Final Result   No change compared to prior exam. Left lung consolidation with   suspected underlying pleural effusion persists.        MACRO:   None        Signed by: Mat Chin 1/29/2025 2:59 PM   Dictation workstation:   [id number]      Transthoracic Echo (TTE) Complete    (Results Pending)       Assessment  Problem   Pleural Effusion   Sclc (Small Cell Lung Carcinoma), Unspecified Laterality   Pancytopenia        Plan  Acute on Chronic Pancytopenia  Has chronic pancytopenia due to malignancy and transfusion dependent  Denies melena or bright red blood per rectum  Hemoglobin on admission 5.9, transfused 2 units improved to 7.3 this AM  Trend H/H on heparin gtt  Heme-onc consult pending     Nonmassive Pulmonary Emboli  Very small and doubt cause of patient's symptoms currently  Continue heparin gtt as hgb stable  Heme-onc consult pending     Small Cell Lung Cancer  C/b malignant left pleural effusion s/p Pleurx  No pleural fluid currently, drains about once a week  Oncology consult pending     Anasarca  Unclear if due to cancer itself, chemotherapy side effect, or CHF  Lasix 40 mg IV twice daily - improved some this AM  Echocardiogram pending     Other Issues  Essential hypertension: Home medications  IDDM type II: MD SS plus home Lantus  Hypomagnesemia: Aggressive replacement  Heterogeneous thyroid: Recommend ultrasound as an outpatient basis     DVT Prophy  Heparin infusion    Code Status: DNR and No Intubation     Andriy Wells PA-C   1/30/2025  11:11 AM

## 2025-01-30 NOTE — PROGRESS NOTES
Social work consult placed for positive medical risk screen. SW reviewed pt's chart and communicated with TCC. No SW needs foreseen at this time. SW signing off; available upon request.    Dewayne Romeo, MSW, LSW ([id number])   Care Transitions

## 2025-01-30 NOTE — CONSULTS
Reason For Consult  Metastatic small cell lung cancer, pulmonary embolism, pancytopenia    History Of Present Illness  Alejandra Smith is a 74 y.o. history of chronic hypoxic respiratory failure 2/2 COPD on 4L O2 outpatient, small cell lung cancer c/b malignant left pleural effusion s/p Pleurx on active chemotherapy, HTN, and IDDM Type II presents with shortness of breath, generalized fatigue, and lower extremity edema.  Patient has history of small cell lung cancer and is currently on her last round of chemotherapy.  Has had progressive swelling in her lower extremities.  Was prescribed Lasix by her oncology team but this has not helped.  Also with generalized fatigue and shortness of breath.  Has history of malignant left pleural effusion s/p Pleurx and has mild pain around the catheter site.  Denies cough, fevers, or chills.  In the ED, hypertensive but other VSS.  Hemoglobin 5.9.  WBC 1.7.  Magnesium 0.98.  CTA of the chest with evidence of small pulmonary emboli no right heart strain.  Transfused 2 units of blood and admitted to medicine.   Patient was admitted in the hospital with pulmonary embolism and started on heparin drip.  Pancytopenia due to chemotherapy patient received RBC transfusion.    Medical oncology consultation requested    Medical record reviewed.    Patient has metastatic small cell lung cancer received chemotherapy and immunotherapy 2 weeks ago.  Patient does have a history of pancytopenia after chemotherapy and after his second dose of chemotherapy patient was admitted in the hospital because of pancytopenia.    Today patient feeling better awake and alert no shortness of breath no fever  Past Medical History  She has a past medical history of Lung cancer (Multi).    Surgical History  She has no past surgical history on file.     Social History  She reports that she quit smoking about 19 months ago. Her smoking use included cigarettes. She has never used smokeless tobacco. She reports  "that she does not currently use alcohol. She reports that she does not use drugs.    Family History  No family history on file.     Allergies  Keflex [cephalexin], Metformin, Chantix [varenicline], Cymbalta [duloxetine], Hydralazine, Levofloxacin, and Sulfasalazine    Review of Systems  Mentioned above     Physical Exam  Afebrile    Neck supple  Lungs examination did show decreased breath sounds on the both side, started for left chest Pleurx catheter    Heart with normal regular rhythm    Abdomen is soft, nontender no masses palpable    Extremity no edema no calf tenderness     Last Recorded Vitals  Blood pressure 162/50, pulse 82, temperature 36.3 °C (97.4 °F), temperature source Temporal, resp. rate 18, height 1.702 m (5' 7\"), weight 100 kg (220 lb 7.4 oz), SpO2 97%.    Relevant Results   Latest Reference Range & Units 01/29/25 14:12 01/30/25 03:38   WBC 4.4 - 11.3 x10*3/uL 1.7 (L) 2.0 (L)   nRBC 0.0 - 0.0 /100 WBCs 0.0 0.0   RBC 4.00 - 5.20 x10*6/uL 2.18 (L) 2.54 (L)   HEMOGLOBIN 12.0 - 16.0 g/dL 5.9 (LL) 7.3 (L)   HEMATOCRIT 36.0 - 46.0 % 18.6 (L) 21.4 (L)   MCV 80 - 100 fL 85 84   MCH 26.0 - 34.0 pg 27.1 28.7   MCHC 32.0 - 36.0 g/dL 31.7 (L) 34.1   RED CELL DISTRIBUTION WIDTH 11.5 - 14.5 % 20.4 (H) 18.4 (H)   Platelets 150 - 450 x10*3/uL 131 (L) 118 (L)      Latest Reference Range & Units 01/29/25 14:12   GLUCOSE 74 - 99 mg/dL 131 (H)   SODIUM 136 - 145 mmol/L 132 (L)   POTASSIUM 3.5 - 5.3 mmol/L 4.0   CHLORIDE 98 - 107 mmol/L 95 (L)   Bicarbonate 21 - 32 mmol/L 26   Anion Gap 10 - 20 mmol/L 15   Blood Urea Nitrogen 6 - 23 mg/dL 15   Creatinine 0.50 - 1.05 mg/dL 0.94   EGFR >60 mL/min/1.73m*2 64   Calcium 8.6 - 10.3 mg/dL 8.7   PHOSPHORUS 2.5 - 4.9 mg/dL 3.3   Albumin 3.4 - 5.0 g/dL 3.2 (L)   Alkaline Phosphatase 33 - 136 U/L 122   ALT 7 - 45 U/L 8   AST 9 - 39 U/L 10   Bilirubin Total 0.0 - 1.2 mg/dL 0.3   Total Protein 6.4 - 8.2 g/dL 6.3 (L)   MAGNESIUM 1.60 - 2.40 mg/dL 0.98 (L)   Lactate 0.4 - 2.0 mmol/L " 2.0   BNP 0 - 99 pg/mL 182 (H)   Troponin I, High Sensitivity 0 - 13 ng/L 12   CT abdominal pelvis, 1.  No evidence of acute abnormality in the abdomen or pelvis.  2. Multiple small hypodense liver lesions are too small to  characterize, but suspicious for liver metastases given the patient's  history. Nonemergent liver MRI may be considered for further  evaluation, as clinically warranted.  3. Small sliding hiatal hernia.  4. Tiny 3 mm intrarenal stone in the right mid kidney.  CT chest angio,1. Tiny pulmonary embolism in a right lower lobe segmental pulmonary  artery and possible punctate pulmonary embolism versus artifact in a  left lower lobe distal segmental pulmonary artery. Cardiomegaly is  noted, however there is no evidence of right heart strain.  2. Partially loculated small left pleural effusion with pleural  catheter in place. Adjacent patchy enhancing consolidation in  bandlike opacities in the lingula and left lower lobe are consistent  with compressive atelectasis.  3. Interlobular septal thickening and confluent ground-glass  opacification in the left upper lobe. This may be due to compressive  atelectasis, atypical infection or lymphangitic spread of malignancy  given the patient's history (recent malignant cytology found on  pleural fluid analysis).  4. Peripheral 11 mm irregular nodular opacity in the left upper lobe  is nonspecific and may be due to a nodular area of atelectasis or  malignancy.  5. Numerous tiny 2-3 mm pulmonary nodules scattered throughout both      Assessment/Plan   #1 metastatic small cell lung cancer on carboplatin -16 and atezolizumab    2.  Pancytopenia due to chemotherapy  3.  Small pulmonary embolism    4.  Malignant pleural effusion    5.  Electrolyte imbalance    Continue supportive care, continue heparin drip, we will change to Eliquis at the time of discharge.  Today hemoglobin is 7.3 after RBC transfusion continue to monitor CBC.  Patient still have neutropenia  today WBC count 2.0 I will start Neupogen for today.    Discussed with patient    I spent 45 minutes in the professional and overall care of this patient.      Liss Read MD

## 2025-01-30 NOTE — CONSULTS
"Nutrition Initial Assessment:   Nutrition Assessment    Reason for Assessment: Admission nursing screening    Medical history per chart: chronic respiratory failure 2/2 COPD on 4L O2 outpatient, small cell lung CA c/b malignant left pleural effusion on active chemotherapy, HTN, IDDM Type 2    Admitted for COPD exacerbation, anemia requiring transfusions, pulmonary embolism    1/30: Pt resting at time of visit. RN reported pt without GI complaints. Recorded PO intake averaged 83% of 2 meals. Weight loss indicated on MST. Will send Ensure Plus High Protein once daily. Pt admission Dx, PMH, labs, medications, allergies, diet orders, skin integrity reviewed. RD to follow per POC, protocol.     Nutrition History:  Food and Nutrient History: From 12/30/24 RD note: Pt had been in facility and did not like their food, had no PO for 4-5 days with abdominal pain and diarrhea, reported c. diff, was willing to take Ensure Plus 2-3 times per day.  Vitamin/Herbal Supplement Use: Ca with Vitamin D-3, Vitamin D-3, Mag-Ox per Home Meds list.  Pattern of Alcohol Consumption: No current alcohol use per H&P.       Dietary Orders (From admission, onward)       Start     Ordered    01/30/25 1659  Oral nutritional supplements  Until discontinued        Question Answer Comment   Deliver with Dinner    Select supplement: Ensure Plus High Protein        01/30/25 1658    01/30/25 0623  Adult diet Consistent Carb; CCD 75 gm/meal  Diet effective 0500        Question Answer Comment   Diet type Consistent Carb    Carb diet selection: CCD 75 gm/meal        01/30/25 0622    01/29/25 2056  May Participate in Room Service  ( ROOM SERVICE MAY PARTICIPATE)  Once        Question:  .  Answer:  Yes    01/29/25 2055                    Anthropometrics:  Height: 170.2 cm (5' 7\")   Weight: 104 kg (228 lb 4.8 oz)   BMI (Calculated): 35.75  IBW/kg (Dietitian Calculated): 61.4 kg     Adjusted Body Weight (kg): 78 kg  Weight History:   Wt Readings from Last 10 " Encounters:   01/30/25 104 kg (228 lb 4.8 oz)   12/29/24 102 kg (225 lb)       Weight Change %:  Weight History / % Weight Change: Weight loss indicated on MST, limited weight history. Chart review shows weight of 102 kg (12/29/24), when compared to 103 kg (1/30/25) weight gain is indicated. Edema noted. Will monitor trends.    Nutrition Focused Physical Exam Findings:  Subcutaneous Fat Loss:   Defer Subcutaneous Fat Loss Assessment: Defer all  Defer All Reason: Pt resting at time of visit.  Muscle Wasting:  Defer Muscle Wasting Assessment: Defer all  Defer All Reason: Pt resting at time of visit.  Edema:  Edema: +2 mild, +3 moderate (per Flowsheet)  Edema Location: +2 LLE, +3 RLE  Physical Findings:  Skin: Negative (RN reported no open areas currently.)    Nutrition Significant Labs:    Results from last 7 days   Lab Units 01/30/25  0338 01/29/25  1412   GLUCOSE mg/dL  --  131*   SODIUM mmol/L  --  132*   POTASSIUM mmol/L  --  4.0   CHLORIDE mmol/L  --  95*   CO2 mmol/L  --  26   BUN mg/dL  --  15   CREATININE mg/dL  --  0.94   EGFR mL/min/1.73m*2  --  64   CALCIUM mg/dL  --  8.7   PHOSPHORUS mg/dL  --  3.3   MAGNESIUM mg/dL 1.72 0.98*     Lab Results   Component Value Date    HGBA1C 6.6 (H) 12/28/2024     Results from last 7 days   Lab Units 01/30/25  0037 01/29/25  2238 01/29/25  1904   POCT GLUCOSE mg/dL 231* 308* 127*       Nutrition Specific Medications:  Meds reviewed/noted.   amLODIPine, 10 mg, oral, Daily  aspirin, 81 mg, oral, Daily  docusate sodium, 100 mg, oral, Daily  filgrastim or biosimilar, 300 mcg, subcutaneous, q24h  furosemide, 40 mg, intravenous, q12h  insulin glargine, 30 Units, subcutaneous, q24h  insulin lispro, 0-10 Units, subcutaneous, Before meals & nightly  labetalol, 1 tablet, oral, q12h KIMMY  losartan, 25 mg, oral, Daily  pantoprazole, 40 mg, oral, Daily before breakfast  perflutren lipid microspheres, 0.5-10 mL of dilution, intravenous, Once in imaging  sulfur hexafluoride microsphr, 2  mL, intravenous, Once in imaging       heparin, 0-4,500 Units/hr, Last Rate: 100 Units/hr (01/30/25 7149)         I/O:    ;      Estimated Needs:   Total Energy Estimated Needs in 24 hours (kCal):  (6940-0840)  Method for Estimating Needs: 25-30 kcal/kg adjusted IBW  Total Protein Estimated Needs in 24 Hours (g):  ()  Method for Estimating 24 Hour Protein Needs: 1.2-1.5 g/kg adjusted IBW  Total Fluid Estimated Needs for 24 Hours (mL):   Method for Estimating 24 Hour Fluid Needs: 1ml/kcal        Nutrition Diagnosis   Malnutrition Diagnosis  Patient has Malnutrition Diagnosis:  (Unable to determine at this time.)    Nutrition Diagnosis  Patient has Nutrition Diagnosis: Yes  Diagnosis Status (1): New  Nutrition Diagnosis 1: Increased nutrient needs  Related to (1): COPD, CA metabolism  As Evidenced by (1): Pt admitted with COPD exacerbation, undergoing chemotherapy  Additional Assessment Information (1): Reported weight loss as well as decreased PO intake/ appetite noted on MST.       Nutrition Interventions/Recommendations   Nutrition prescription for oral nutrition    Nutrition Recommendations:  Individualized Nutrition Prescription Provided for : Contine current diet order.    Nutrition Interventions/Goals:   Interventions: Meals and snacks, Medical food supplement  Meals and Snacks: Carbohydrate-modified diet  Goal: Consume >75% of meals.  Medical Food Supplement: Commercial beverage medical food supplement therapy  Goal: Consume ONS once daily.  Coordination of Care with Providers: Nursing (Cheli RN)      Education Documentation  No documentation found.            Nutrition Monitoring and Evaluation   Food/Nutrient Related History Monitoring  Monitoring and Evaluation Plan: Intake / amount of food  Intake / Amount of food: Consumes at least 75% or more of meals/snacks/supplements    Anthropometric Measurements  Monitoring and Evaluation Plan: Body weight  Body Weight: Body weight - Weight reduction from  fluids, as needed (Maintain stable dry weight)    Biochemical Data, Medical Tests and Procedures  Monitoring and Evaluation Plan: Electrolyte/renal panel, Glucose/endocrine profile  Electrolyte and Renal Panel: BUN, Creatinine, Sodium, Magnesium, Potassium, Phosphorus  Criteria: WDL  Glucose/Endocrine Profile: Glucose within normal limits ( mg/dL), Hemoglobin A1c (HgbA1c)    Physical Exam Findings  Monitoring and Evaluation Plan: Adipose, Muscles, Skin  Adipose: Other (Comment) (monitor for loss)  Criteria: WDL  Muscles: Other (Comment) (monitor for loss)  Criteria: WDL  Skin: Other (Comment) (monitor for breakdown)  Other: intact    Goal Status: New goal(s) identified    Time Spent (min): 45 minutes

## 2025-01-30 NOTE — H&P
[facility]    Hospital Medicine History & Physical     Assessment & Plan     ASSESSMENT    74F with history of chronic hypoxic respiratory failure 2/2 COPD on 4L O2 outpatient, small cell lung cancer c/b malignant left pleural effusion s/p Pleurx on active chemotherapy, HTN, and IDDM Type II presents with shortness of breath, generalized fatigue, and lower extremity edema.    PLAN    Acute on Chronic Pancytopenia  -Has chronic pancytopenia due to malignancy and transfusion dependent  -Denies melena or bright red blood per rectum  -Hemoglobin on admission 5.9, transfused 2 units and trend    Nonmassive Pulmonary Emboli  -Very small and doubt cause of patient's symptoms currently  -Will repeat hemoglobin after transfusions, if stable start heparin infusion    Small Cell Lung Cancer  -C/b malignant left pleural effusion s/p Pleurx  -No pleural fluid currently, drains about once a week  -Oncology consultation    Anasarca  -Unclear if due to cancer itself, chemotherapy side effect, or CHF  -Lasix 40 mg IV twice daily (unclear if it will be Lasix responsive)  -Echocardiogram    Other Issues  -Essential hypertension: Home medications  -IDDM type II: MD SS plus home Lantus  -Hypomagnesemia: Aggressive replacement  -Heterogeneous thyroid: Recommend ultrasound as an outpatient basis    DVT Prophy  -Heparin infusion    Disposition  -Med Surg      Jonathan Dias MD    History of Present Illness     Alejandra Smith is a 74 y.o. history of chronic hypoxic respiratory failure 2/2 COPD on 4L O2 outpatient, small cell lung cancer c/b malignant left pleural effusion s/p Pleurx on active chemotherapy, HTN, and IDDM Type II presents with shortness of breath, generalized fatigue, and lower extremity edema.  Patient has history of small cell lung cancer and is currently on her last round of chemotherapy.  Has had progressive swelling in her lower extremities.  Was prescribed Lasix by her  "oncology team but this has not helped.  Also with generalized fatigue and shortness of breath.  Has history of malignant left pleural effusion s/p Pleurx and has mild pain around the catheter site.  Denies cough, fevers, or chills.  In the ED, hypertensive but other VSS.  Hemoglobin 5.9.  WBC 1.7.  Magnesium 0.98.  CTA of the chest with evidence of small pulmonary emboli no right heart strain.  Transfused 2 units of blood and admitted to medicine.    Review of Systems     A Comprehensive greater than 10 system review of systems was carried out.  Pertinent positives and negatives are noted above.  Otherwise negative for contributory information.     Past Medical History     Past Medical History:   Diagnosis Date    Lung cancer (Multi)      Medications   (Not in a hospital admission)       Past Surgical History   History reviewed. No pertinent surgical history.     Family History   Reviewed and non-contributory to presenting complaints    Allergies     Allergies   Allergen Reactions    Keflex [Cephalexin] Unknown    Metformin Diarrhea and Unknown     severe diarrhea    Chantix [Varenicline] Unknown    Cymbalta [Duloxetine] Unknown    Hydralazine Hives    Levofloxacin Unknown and Hives     IV Levaquin    Sulfasalazine Headache and Nausea/vomiting     Social History     Social History     Tobacco Use    Smoking status: Former     Current packs/day: 0.00     Types: Cigarettes     Quit date: 2023     Years since quittin.5    Smokeless tobacco: Never   Substance Use Topics    Alcohol use: Not Currently     Physical Exam   Blood pressure (!) 183/82, pulse 71, temperature 36.4 °C (97.5 °F), temperature source Temporal, resp. rate 20, height 1.702 m (5' 7\"), weight 100 kg (220 lb 7.4 oz), SpO2 95%.    General: Ill appearing, cooperative with exam, in NAD.  HEENT: Atraumatic. No erythema in posterior pharynx.  Lymph: No cervical or inguinal lymphadenopathy.  Cardiac: RRR. No murmurs.  Lungs: CTAB. Nl WOB.  Abd: " Non-tender. No rebound or gaurding. Nl bowel sounds.  Ext: 2+ pitting edema. 2+ pulses.  Skin: No rashes, abrasions, or contusions.  Psych: A&Ox3. Nl affect.  Neuro: 5/5 strength. Sensation intact.    Labs & Imaging     Reviewed and Pertinent results discussed in assessment and plan.

## 2025-01-30 NOTE — PROGRESS NOTES
01/30/25 1326   Discharge Planning   Living Arrangements Alone   Support Systems Family members;Home care staff   Assistance Needed Home Health Care PT OT SN and HHA   Type of Residence Private residence   Home or Post Acute Services In home services   Type of Home Care Services Home health aide;Home nursing visits;Home OT;Home PT;DME or oxygen   Expected Discharge Disposition Home Health   Patient Choice   Patient / Family choosing to utilize agency / facility established prior to hospitalization Yes   Intensity of Service   Intensity of Service 0-30 min     Met with patient at bedside, introduced self and role as RN TCC. Patient lives at home alone, 4th floor apartment with an elevator. Sister is POA. She states she mainly uses wheelchair around the home, can stand, pivot and transfer. Has wheelchair, walker, cane, grab bars, grabber, raised toilet, bedside commode, shower chair, bath bench, Home O2, portable concentrator. She has a HHA for bathing 2x weekly along with PT OT SN with [facility] and also HHA though [facility] 6 days a week, 40 hours total weekly, for light housekeeping, meal prep. She uses her insurance for transport, [facility] as well as PCP. She is admitted for pancytopenia, PE, anasarca. She is on home O2 at baseline, 4L continuous with [employer]. She denies current needs for home going at this time. TCC to continue to follow if needs arise.

## 2025-01-31 ENCOUNTER — PHARMACY VISIT (OUTPATIENT)
Dept: PHARMACY | Facility: CLINIC | Age: 75
End: 2025-01-31
Payer: MEDICARE

## 2025-01-31 LAB
ANION GAP SERPL CALC-SCNC: 12 MMOL/L (ref 10–20)
BASOPHILS # BLD MANUAL: 0 X10*3/UL (ref 0–0.1)
BASOPHILS NFR BLD MANUAL: 0 %
BUN SERPL-MCNC: 14 MG/DL (ref 6–23)
CALCIUM SERPL-MCNC: 8.9 MG/DL (ref 8.6–10.3)
CHLORIDE SERPL-SCNC: 93 MMOL/L (ref 98–107)
CO2 SERPL-SCNC: 32 MMOL/L (ref 21–32)
CREAT SERPL-MCNC: 0.88 MG/DL (ref 0.5–1.05)
EGFRCR SERPLBLD CKD-EPI 2021: 69 ML/MIN/1.73M*2
EOSINOPHIL # BLD MANUAL: 0 X10*3/UL (ref 0–0.4)
EOSINOPHIL NFR BLD MANUAL: 0 %
ERYTHROCYTE [DISTWIDTH] IN BLOOD BY AUTOMATED COUNT: 19.3 % (ref 11.5–14.5)
GLUCOSE SERPL-MCNC: 157 MG/DL (ref 74–99)
HCT VFR BLD AUTO: 22.7 % (ref 36–46)
HGB BLD-MCNC: 7.5 G/DL (ref 12–16)
IMM GRANULOCYTES # BLD AUTO: 0.13 X10*3/UL (ref 0–0.5)
IMM GRANULOCYTES NFR BLD AUTO: 1.5 % (ref 0–0.9)
LYMPHOCYTES # BLD MANUAL: 2.11 X10*3/UL (ref 0.8–3)
LYMPHOCYTES NFR BLD MANUAL: 24 %
MCH RBC QN AUTO: 29.1 PG (ref 26–34)
MCHC RBC AUTO-ENTMCNC: 33 G/DL (ref 32–36)
MCV RBC AUTO: 88 FL (ref 80–100)
METAMYELOCYTES # BLD MANUAL: 0.18 X10*3/UL
METAMYELOCYTES NFR BLD MANUAL: 2 %
MONOCYTES # BLD MANUAL: 0 X10*3/UL (ref 0.05–0.8)
MONOCYTES NFR BLD MANUAL: 0 %
NEUTROPHILS # BLD MANUAL: 6.42 X10*3/UL (ref 1.6–5.5)
NEUTS BAND # BLD MANUAL: 2.64 X10*3/UL (ref 0–0.5)
NEUTS BAND NFR BLD MANUAL: 30 %
NEUTS SEG # BLD MANUAL: 3.78 X10*3/UL (ref 1.6–5)
NEUTS SEG NFR BLD MANUAL: 43 %
NRBC BLD-RTO: 0.2 /100 WBCS (ref 0–0)
PLATELET # BLD AUTO: 136 X10*3/UL (ref 150–450)
POTASSIUM SERPL-SCNC: 3.5 MMOL/L (ref 3.5–5.3)
RBC # BLD AUTO: 2.58 X10*6/UL (ref 4–5.2)
RBC MORPH BLD: ABNORMAL
SODIUM SERPL-SCNC: 133 MMOL/L (ref 136–145)
TOTAL CELLS COUNTED BLD: 100
TOXIC GRANULES BLD QL SMEAR: PRESENT
UFH PPP CHRO-ACNC: 0.2 IU/ML
VARIANT LYMPHS # BLD MANUAL: 0.09 X10*3/UL (ref 0–0.3)
VARIANT LYMPHS NFR BLD: 1 %
WBC # BLD AUTO: 8.8 X10*3/UL (ref 4.4–11.3)

## 2025-01-31 PROCEDURE — 99239 HOSP IP/OBS DSCHRG MGMT >30: CPT | Performed by: PHYSICIAN ASSISTANT

## 2025-01-31 PROCEDURE — RXMED WILLOW AMBULATORY MEDICATION CHARGE

## 2025-01-31 PROCEDURE — 2500000004 HC RX 250 GENERAL PHARMACY W/ HCPCS (ALT 636 FOR OP/ED): Performed by: INTERNAL MEDICINE

## 2025-01-31 PROCEDURE — 80048 BASIC METABOLIC PNL TOTAL CA: CPT | Performed by: INTERNAL MEDICINE

## 2025-01-31 PROCEDURE — 2500000002 HC RX 250 W HCPCS SELF ADMINISTERED DRUGS (ALT 637 FOR MEDICARE OP, ALT 636 FOR OP/ED): Performed by: REGISTERED NURSE

## 2025-01-31 PROCEDURE — 85027 COMPLETE CBC AUTOMATED: CPT | Performed by: INTERNAL MEDICINE

## 2025-01-31 PROCEDURE — 2500000001 HC RX 250 WO HCPCS SELF ADMINISTERED DRUGS (ALT 637 FOR MEDICARE OP): Performed by: INTERNAL MEDICINE

## 2025-01-31 PROCEDURE — 36415 COLL VENOUS BLD VENIPUNCTURE: CPT | Performed by: INTERNAL MEDICINE

## 2025-01-31 PROCEDURE — 2500000001 HC RX 250 WO HCPCS SELF ADMINISTERED DRUGS (ALT 637 FOR MEDICARE OP): Performed by: PHYSICIAN ASSISTANT

## 2025-01-31 PROCEDURE — 1200000002 HC GENERAL ROOM WITH TELEMETRY DAILY

## 2025-01-31 PROCEDURE — 2500000005 HC RX 250 GENERAL PHARMACY W/O HCPCS: Performed by: INTERNAL MEDICINE

## 2025-01-31 PROCEDURE — 85007 BL SMEAR W/DIFF WBC COUNT: CPT | Performed by: INTERNAL MEDICINE

## 2025-01-31 PROCEDURE — 85520 HEPARIN ASSAY: CPT | Performed by: INTERNAL MEDICINE

## 2025-01-31 RX ORDER — ASCORBIC ACID 500 MG
500 TABLET ORAL DAILY
COMMUNITY
End: 2025-02-01 | Stop reason: HOSPADM

## 2025-01-31 RX ADMIN — APIXABAN 10 MG: 5 TABLET, FILM COATED ORAL at 14:23

## 2025-01-31 RX ADMIN — LOSARTAN POTASSIUM 25 MG: 25 TABLET, FILM COATED ORAL at 08:12

## 2025-01-31 RX ADMIN — INSULIN LISPRO 4 UNITS: 100 INJECTION, SOLUTION INTRAVENOUS; SUBCUTANEOUS at 16:26

## 2025-01-31 RX ADMIN — LABETALOL HYDROCHLORIDE 200 MG: 200 TABLET, FILM COATED ORAL at 17:31

## 2025-01-31 RX ADMIN — HEPARIN SODIUM 1800 UNITS/HR: 10000 INJECTION, SOLUTION INTRAVENOUS at 09:37

## 2025-01-31 RX ADMIN — ALUMINUM HYDROXIDE, MAGNESIUM HYDROXIDE, AND DIMETHICONE 30 ML: 200; 20; 200 SUSPENSION ORAL at 16:23

## 2025-01-31 RX ADMIN — PANTOPRAZOLE SODIUM 40 MG: 40 TABLET, DELAYED RELEASE ORAL at 07:04

## 2025-01-31 RX ADMIN — AMLODIPINE BESYLATE 10 MG: 10 TABLET ORAL at 08:12

## 2025-01-31 RX ADMIN — INSULIN LISPRO 2 UNITS: 100 INJECTION, SOLUTION INTRAVENOUS; SUBCUTANEOUS at 12:46

## 2025-01-31 RX ADMIN — ASPIRIN 81 MG: 81 TABLET, COATED ORAL at 08:12

## 2025-01-31 RX ADMIN — APIXABAN 10 MG: 5 TABLET, FILM COATED ORAL at 21:02

## 2025-01-31 RX ADMIN — INSULIN LISPRO 2 UNITS: 100 INJECTION, SOLUTION INTRAVENOUS; SUBCUTANEOUS at 08:13

## 2025-01-31 RX ADMIN — LABETALOL HYDROCHLORIDE 200 MG: 200 TABLET, FILM COATED ORAL at 05:25

## 2025-01-31 RX ADMIN — Medication 4 L/MIN: at 08:20

## 2025-01-31 RX ADMIN — Medication 4 L/MIN: at 20:00

## 2025-01-31 RX ADMIN — FUROSEMIDE 40 MG: 10 INJECTION, SOLUTION INTRAMUSCULAR; INTRAVENOUS at 07:04

## 2025-01-31 ASSESSMENT — COGNITIVE AND FUNCTIONAL STATUS - GENERAL
CLIMB 3 TO 5 STEPS WITH RAILING: TOTAL
MOVING TO AND FROM BED TO CHAIR: A LITTLE
MOBILITY SCORE: 17
DRESSING REGULAR LOWER BODY CLOTHING: A LITTLE
WALKING IN HOSPITAL ROOM: A LOT
DAILY ACTIVITIY SCORE: 21
HELP NEEDED FOR BATHING: A LITTLE
MOVING TO AND FROM BED TO CHAIR: A LITTLE
DAILY ACTIVITIY SCORE: 21
TOILETING: A LITTLE
TOILETING: A LITTLE
MOBILITY SCORE: 17
STANDING UP FROM CHAIR USING ARMS: A LITTLE
HELP NEEDED FOR BATHING: A LITTLE
DRESSING REGULAR LOWER BODY CLOTHING: A LITTLE
WALKING IN HOSPITAL ROOM: A LOT
STANDING UP FROM CHAIR USING ARMS: A LITTLE
CLIMB 3 TO 5 STEPS WITH RAILING: TOTAL

## 2025-01-31 ASSESSMENT — PAIN - FUNCTIONAL ASSESSMENT: PAIN_FUNCTIONAL_ASSESSMENT: 0-10

## 2025-01-31 ASSESSMENT — PAIN SCALES - GENERAL
PAINLEVEL_OUTOF10: 0 - NO PAIN
PAINLEVEL_OUTOF10: 0 - NO PAIN

## 2025-01-31 NOTE — PROGRESS NOTES
"Internal Medicine Progress Note    Name: Alejandra Mccabeland  : 1950  Chief Complaint: Shortness of Breath, Leg Swelling, Dizziness, Nausea, and Vomiting (Pt has c/c of bilateral lower rib pain duration 2hrs and reported general weakness, SOB, dizziness, n/v, bilateral leg swelling duration 5 days. )  Primary Care Physician: Kevin Orantes MD  Admission date: 2025  Date of service: 2025     History of Present Illness  2025: Patient seen  laying back in bed this morning. She is awake and alert, in no acute respiratory distress on her baseline oxygen. States her edema is improving. Denies any further SOB. Hgb stable this AM, seen by oncology and ok with transition to [facility]. Patient is requesting IR evaluation of her Pleurex catheter as she feels a stitch has come loose. If catheter check is good will plan for likely discharge home today. Patient denies any other complaints or concerns at this time    Allergies  Allergies   Allergen Reactions    Keflex [Cephalexin] Unknown    Metformin Diarrhea and Unknown     severe diarrhea    Chantix [Varenicline] Unknown    Cymbalta [Duloxetine] Unknown    Hydralazine Hives    Levofloxacin Unknown and Hives     IV Levaquin    Sulfasalazine Headache and Nausea/vomiting       Review of Systems:   12 point ROS reviewed and negative other than noted above    Objective  VITALS:  /65 (BP Location: Right arm, Patient Position: Lying)   Pulse 65   Temp 35.8 °C (96.5 °F) (Temporal)   Resp 18   Ht 1.702 m (5' 7\")   Wt 104 kg (228 lb 4.8 oz)   SpO2 98%   BMI 35.76 kg/m²     Physical Exam:   Constitutional: Obese, chronically ill appearing female in no acute respiratory distress. Sitting up in bed comfortably and talkative  Eyes: PERRL, EOMI  Head/Neck: Normocephalic, atraumatic. Neck supple, no thyromegaly, JVD. Trachea midline  Respiratory/Thorax: Normal respiratory effort. Lungs CTAB with no wheezing, rales, or rhonchi noted. Left pleurex catheter in " "place  Cardiovascular: RRR, no murmurs, rubs, or gallops noted. Peripheral pulses 2+  Gastrointestinal: Bowel sounds normal. Abdomen obese, soft, nontender, nondistended, with no palpable masses  Musculoskeletal: No gross deformities. No gross muscular weakness with no ROM limitation  Extremities: 1-2+ BLE edema  Neurological: Alert and oriented x3, CN II - VII grossly intact  Psychological: Appropriate mood and affect  Skin: No rashes or lesions noted.    Labs-   Lab Results   Component Value Date    WBC 8.8 01/31/2025    HGB 7.5 (L) 01/31/2025    HCT 22.7 (L) 01/31/2025     (L) 01/31/2025     (L) 01/31/2025    K 3.5 01/31/2025    CL 93 (L) 01/31/2025    CREATININE 0.88 01/31/2025    BUN 14 01/31/2025    CO2 32 01/31/2025    GLUCOSE 157 (H) 01/31/2025    ALT 8 01/29/2025    AST 10 01/29/2025    INR 1.1 01/29/2025    APTT 29 12/28/2024       No results found for: \"IRON\", \"FERRITIN\", \"TIBC\"    Lab Results   Component Value Date    TROPHS 12 01/29/2025    TROPHS 12 01/29/2025        Lab Results   Component Value Date    TSH 0.97 12/29/2024       Lab Results   Component Value Date    MG 1.72 01/30/2025    PHOS 3.3 01/29/2025         No results found for this or any previous visit from the past 1095 days.            Recent Radiological Studies:  Transthoracic Echo (TTE) Complete   Final Result      Vascular US lower extremity venous duplex bilateral   Final Result   Suboptimal visualization of the calf veins. No sonographic evidence   DVT in the visualized vessels of bilateral lower extremities.        MACRO:   None        Signed by: Garett Burgess 1/29/2025 7:00 PM   Dictation workstation:   [id number]      CT angio chest for pulmonary embolism   Final Result   1. Tiny pulmonary embolism in a right lower lobe segmental pulmonary   artery and possible punctate pulmonary embolism versus artifact in a   left lower lobe distal segmental pulmonary artery. Cardiomegaly is   noted, however there is no evidence " of right heart strain.   2. Partially loculated small left pleural effusion with pleural   catheter in place. Adjacent patchy enhancing consolidation in   bandlike opacities in the lingula and left lower lobe are consistent   with compressive atelectasis.   3. Interlobular septal thickening and confluent ground-glass   opacification in the left upper lobe. This may be due to compressive   atelectasis, atypical infection or lymphangitic spread of malignancy   given the patient's history (recent malignant cytology found on   pleural fluid analysis).   4. Peripheral 11 mm irregular nodular opacity in the left upper lobe   is nonspecific and may be due to a nodular area of atelectasis or   malignancy.   5. Numerous tiny 2-3 mm pulmonary nodules scattered throughout both   lungs, greatest in the left upper lobe. These may be metastatic or   infectious/inflammatory.   6. Mediastinal and right hilar adenopathy, which may be metastatic or   reactive.   7. Recommend short interval follow-up CT chest to reassess the above   findings.   8. Enlarged heterogeneous thyroid with bilateral thyroid nodules.   This can be further assessed with outpatient thyroid ultrasound.   9. Small hiatal hernia. Please see separate CT abdomen and pelvis   report for abdominal findings.        MACRO:   Dr. Krystyna Huerta discussed the significance and urgency of this   critical finding by telephone with Dr. MARTY LEJEUNE on 1/29/2025 at   6:22 pm.  (**-RCF-**) Findings:  See findings.        Signed by: Krystyna Huerta 1/29/2025 6:29 PM   Dictation workstation:   [id number]      CT abdomen pelvis w IV contrast   Final Result   1.  No evidence of acute abnormality in the abdomen or pelvis.   2. Multiple small hypodense liver lesions are too small to   characterize, but suspicious for liver metastases given the patient's   history. Nonemergent liver MRI may be considered for further   evaluation, as clinically warranted.   3. Small sliding hiatal  hernia.   4. Tiny 3 mm intrarenal stone in the right mid kidney.   5. Tiny probable renal cysts in the left upper pole.   6. Prior cholecystectomy and hysterectomy.             MACRO:   None        Signed by: Krystyna Huerta 1/29/2025 6:39 PM   Dictation workstation:   [id number]      XR chest 1 view   Final Result   No change compared to prior exam. Left lung consolidation with   suspected underlying pleural effusion persists.        MACRO:   None        Signed by: Mat Chin 1/29/2025 2:59 PM   Dictation workstation:   [id number]          Assessment  Pulmonary embolism  Pleural Effusion   Sclc (Small Cell Lung Carcinoma), Unspecified Laterality   Pancytopenia          Plan  Acute on Chronic Pancytopenia  Has chronic pancytopenia due to malignancy and transfusion dependent  Denies melena or bright red blood per rectum  Hemoglobin stable this morning at 7.5  Heme-onc consulted, given neupogen yesterday with improvement  Continue to follow     Nonmassive Pulmonary Emboli  Very small and doubt cause of patient's symptoms currently  Can transition to Eliquis for discharge     Small Cell Lung Cancer  C/b malignant left pleural effusion s/p Pleurx  No pleural fluid currently, drains about once a week  Oncology following     Anasarca  Unclear if due to cancer itself, chemotherapy side effect, or CHF  Lasix 40 mg IV twice daily - continues to improve, back to baseline per patient  Echocardiogram noted     Other Issues  Essential hypertension: Home medications  IDDM type II: MD CRUZ plus home Lantus  Hypomagnesemia: Aggressive replacement  Heterogeneous thyroid: Recommend ultrasound as an outpatient basis     DVT Prophy  Heparin / Eliquis    Code Status: DNR and No Intubation     Andriy Wells PA-C   1/31/2025  7:37 AM

## 2025-01-31 NOTE — CARE PLAN
Problem: Safety - Adult  Goal: Free from fall injury  Outcome: Progressing     Problem: Skin  Goal: Decreased wound size/increased tissue granulation at next dressing change  Outcome: Progressing  Flowsheets (Taken 1/31/2025 1601)  Decreased wound size/increased tissue granulation at next dressing change:   Promote sleep for wound healing   Protective dressings over bony prominences  Goal: Participates in plan/prevention/treatment measures  Outcome: Progressing  Flowsheets (Taken 1/31/2025 1601)  Participates in plan/prevention/treatment measures:   Discuss with provider PT/OT consult   Elevate heels   Increase activity/out of bed for meals  Goal: Prevent/manage excess moisture  Outcome: Progressing  Flowsheets (Taken 1/31/2025 1601)  Prevent/manage excess moisture:   Cleanse incontinence/protect with barrier cream   Moisturize dry skin   Follow provider orders for dressing changes   Monitor for/manage infection if present  Goal: Prevent/minimize sheer/friction injuries  Outcome: Progressing  Flowsheets (Taken 1/31/2025 1601)  Prevent/minimize sheer/friction injuries:   HOB 30 degrees or less   Increase activity/out of bed for meals   Turn/reposition every 2 hours/use positioning/transfer devices   Use pull sheet  Goal: Promote/optimize nutrition  Outcome: Progressing  Flowsheets (Taken 1/31/2025 1601)  Promote/optimize nutrition:   Offer water/supplements/favorite foods   Consume > 50% meals/supplements   Monitor/record intake including meals  Goal: Promote skin healing  Outcome: Progressing  Flowsheets (Taken 1/31/2025 1601)  Promote skin healing:   Assess skin/pad under line(s)/device(s)   Ensure correct size (line/device) and apply per  instructions   Protective dressings over bony prominences   Rotate device position/do not position patient on device   Turn/reposition every 2 hours/use positioning/transfer devices        The clinical goals for the shift include Pt will remaiin free from fall or  injury throuogh end of this shift.  No fall or injury this shift. Current safety interventions continue, All needs met this shift. No new skin breakdown this shift.

## 2025-01-31 NOTE — NURSING NOTE
Asked by staff to assess pt's left sided tunneled pleural catheter. Upon inspection this is an aspira brand catheter and not a pleurx. We only carry supplies to drain a pleurx catheter and do not have the proper tools to drain this catheter. Pt states she is drained at home once per week, but that there has been no fluid the last few weeks when draining has been attempted. She currently has no shortness of breath or pain. I don't think attempting to drain is necessary today in the absence of any respiratory symptoms. Advised pt to bring supplies to drain in from home if she will have a prolonged hospital stay. Also reassured pt that her stitch falling out is completely normal and no intervention is needed at this time. Please contact IR at ext [phone] with any further questions.

## 2025-01-31 NOTE — DISCHARGE SUMMARY
Internal Medicine Discharge Summary    NAME: Alejandra Smith :  1950  MRN:  [id number] PCP:Kevin Orantes MD    ADMITTED: 2025   DISCHARGED: 25    ADMITTING PHYSICIAN: Haylie Bush MD    PCP: Kevin Orantes MD    CONSULTANT(S):   IP CONSULT TO HEMATOLOGY-ONCOLOGY  IP CONSULT TO SOCIAL WORK  IP CONSULT TO NUTRITION SERVICES     ADMITTING DIAGNOSIS:   COPD exacerbation (Multi) [J44.1]  Anemia requiring transfusions [D64.9]  Pulmonary embolism, other, unspecified chronicity, unspecified whether acute cor pulmonale present (Multi) [I26.99]     Please see H&P for further details    DISCHARGE DIAGNOSES:   [unfilled]    BRIEF HISTORY OF PRESENT ILLNESS: Alejandra Smith is a 74 y.o. female patient of Kevin Orantes MD who  has a past medical history of Lung cancer (Multi). who originally had concerns including Shortness of Breath, Leg Swelling, Dizziness, Nausea, and Vomiting (Pt has c/c of bilateral lower rib pain duration 2hrs and reported general weakness, SOB, dizziness, n/v, bilateral leg swelling duration 5 days. ). at presentation on 2025, and was found to have COPD exacerbation (Multi) [J44.1]  Anemia requiring transfusions [D64.9]  Pulmonary embolism, other, unspecified chronicity, unspecified whether acute cor pulmonale present (Multi) [I26.99] after workup.    Please see H&P for further details.    HOSPITAL COURSE:   The patient presented to the hospital with the chief complaint of Shortness of Breath, Leg Swelling, Dizziness, Nausea, and Vomiting (Pt has c/c of bilateral lower rib pain duration 2hrs and reported general weakness, SOB, dizziness, n/v, bilateral leg swelling duration 5 days. )  . The patient was admitted to the hospital.     Acute on Chronic Pancytopenia  Has chronic pancytopenia due to malignancy and transfusion dependent  Denies melena or bright red blood per rectum  Hemoglobin stable this morning at 7.5  Heme-onc consulted, given neupogen yesterday  "with improvement  Ok for discharge per oncology     Nonmassive Pulmonary Emboli  Very small and doubt cause of patient's symptoms currently  Transition to [facility] for discharge     Small Cell Lung Cancer  C/b malignant left pleural effusion s/p Pleurx  No pleural fluid currently, drains about once a week  Oncology following     Anasarca  Unclear if due to cancer itself, chemotherapy side effect, or CHF  Lasix 40 mg IV twice daily - continues to improve, back to baseline per patient  Echocardiogram noted     Other Issues  Essential hypertension: Home medications  IDDM type II: MD CRUZ plus home Lantus  Hypomagnesemia: Aggressive replacement  Heterogeneous thyroid: Recommend ultrasound as an outpatient basis      BRIEF PHYSICAL EXAMINATION AND LABORATORIES ON DAY OF DISCHARGE:  VITALS:  /54 (BP Location: Right arm, Patient Position: Lying)   Pulse 57   Temp 36.1 °C (97 °F) (Temporal)   Resp 17   Ht 1.702 m (5' 7\")   Wt 104 kg (228 lb 4.8 oz)   SpO2 96%   BMI 35.76 kg/m²       Please see note from the same day.     LABS::  @LABRCNT(NA:3,K:3,CL:3,CO2:3,BUN:3,CREATININE:3,GLUCOSE:3,CALCIUM:3)@  @LABRCNT(ALKPHOS:3,ALT:3,AST:3,PROT:3,BILITOT:3,BILIDIR:3,LABALBU:3)@  @LABRCNT(WBC:3,RBC:3,HGB:3,HCT:3,MCV:3,MCH:3,MCHC:3,RDW:3,PLT:3,MPV:3)@  No components found for: \"LABA1C\"  Lab Results   Component Value Date    INR 1.1 01/29/2025    INR 1.2 (H) 12/28/2024    PROTIME 12.1 01/29/2025    PROTIME 13.2 (H) 12/28/2024      Lab Results   Component Value Date    TSH 0.97 12/29/2024     No results found for: \"TRIG\", \"HDL\", \"LDLCALC\"  @LABRCNT(MG:3)@    @LABRCNT(CKTOTAL:3,CKMB:3,TROPONINI:3)@   @LABRCNT(LACTA)@    IMAGING:  [unfilled]      DISPOSITION:  The patient's condition is fair.   The patient is being discharged to home    DISCHARGE MEDICATIONS:      Your medication list        CONTINUE taking these medications        Instructions Last Dose Given Next Dose Due   acyclovir 800 mg tablet  Commonly known as: Zovirax      " Take 1 tablet (800 mg) by mouth once daily.       albuterol 90 mcg/actuation inhaler      Inhale 2 puffs every 4 hours if needed for wheezing or shortness of breath.       amLODIPine 10 mg tablet  Commonly known as: Norvasc      Take 1 tablet (10 mg) by mouth once daily.       aspirin 81 mg EC tablet      Take 1 tablet (81 mg) by mouth once daily.       calcium carbonate-vitamin D3 600 mg-5 mcg (200 unit) tablet  Commonly known as: Calcium 600 + D(3)      Take 1 tablet by mouth 2 times a day.       cholecalciferol 50 mcg (2,000 unit) capsule  Commonly known as: Vitamin D-3      Take 1 capsule (50 mcg) by mouth once every 24 hours.       furosemide 20 mg tablet  Commonly known as: Lasix      Take 1 tablet (20 mg) by mouth once daily as needed (LE swelling).       HYDROmorphone 2 mg tablet  Commonly known as: Dilaudid      Take 1 tablet (2 mg) by mouth every 3 hours if needed for severe pain (7 - 10).       insulin lispro 100 unit/mL injection      Inject 0-5 Units under the skin 3 times daily (morning, midday, late afternoon). As directed by sliding scale  Do not hold when patient is not eating, continue order as scheduled for hyperglycemia management.  Insulin Lispro Corrective Scale #1     Hypoglycemia protocol Call LIP unit(s) if Blood Glucose is between 0 - 70 mg/dL    0 unit(s) if Blood glucose is between   1 unit(s) if Blood glucose is between 151-200  2 unit(s) if Blood glucose is between 201-250  3 unit(s) if Blood glucose is between 251-300  4 unit(s) if Blood glucose is between 301-350  5 unit(s) if Blood glucose is between 351-400    If blood glucose is greater than 400 mg/dL, give max insulin per sliding scale AND then contact provider.       labetalol 200 mg tablet  Commonly known as: Normodyne      Take 1 tablet (200 mg) by mouth every 12 hours.       Lantus U-100 Insulin 100 unit/mL injection  Generic drug: insulin glargine      Inject 30 Units under the skin once daily at bedtime. Take as  directed per insulin instructions.       losartan 25 mg tablet  Commonly known as: Cozaar      Take 1 tablet (25 mg) by mouth once daily.       magnesium oxide 400 mg tablet  Commonly known as: Mag-Ox      Take 1 tablet (400 mg) by mouth once daily.       Ocuvite with Lutein 300 mcg-200 mg-27 mg-2 mg tablet  Generic drug: vit A,C and E-lutein-minerals      Take 1 tablet by mouth once daily.       omeprazole 40 mg DR capsule  Commonly known as: PriLOSEC      Take 1 capsule (40 mg) by mouth 2 times a day before meals. Do not crush or chew.       ondansetron 4 mg tablet  Commonly known as: Zofran      Take 1 tablet (4 mg) by mouth every 6 hours if needed for nausea or vomiting.              STOP taking these medications      baclofen 5 mg tablet  Commonly known as: Lioresal               ASK your doctor about these medications        Instructions Last Dose Given Next Dose Due   apixaban 5 mg (74 tabs) tablet  Commonly known as: Eliquis      Take 2 tablets by mouth twice daily for 7 days then take 1 tablet by mouth daily thereafter                 Where to Get Your Medications        These medications were sent to [facility]  [address] [address]      Hours: 8AM to 6PM Mon-Fri, 8AM to 4PM Sat-Sun Phone: [phone]   apixaban 5 mg (74 tabs) tablet           INTERNAL MEDICINE INSTRUCTIONS:  Follow-up with primary care physician as directed in discharge paperwork.  Please review results of imaging studies with PCP.  Follow-up with consultants as directed by them.  If recurrence or worsening of symptoms go to the ED or call primary care physician.  Diet: Adult diet Consistent Carb; CCD 75 gm/meal    FOLLOW-IP  Oscar Wang MD PhD  [address] [facility]    [facility]  [facility], [address] [place] [id number] [phone]    Schedule an appointment as soon as possible for a visit in 1 week(s)  For follow up to hospital stay    Kevin Orantes MD  [address]  [facility]  [id number] [phone]    Call in 1 week(s)  For follow up to hospital stay      Preparing for this patient's discharge, including paperwork, orders, instructions, and meeting with patient did required 34 minutes.    Electronically signed by Andriy Wells PA-C on 1/31/2025 at 1:22 PM

## 2025-01-31 NOTE — PROGRESS NOTES
Alejandra Smith is a 74 y.o. female admitted for COPD exacerbation (Multi). Pharmacy reviewed the patient's [id number] medications and allergies for accuracy.    The list below reflects the PTA list prior to pharmacy medication history. A summary a changes to the PTA medication list has been listed below. Please review each medication in order reconciliation for additional clarification and justification.    Source of information:  PATIENT    Medications added:  VITAMIN C 500MG 1 QD    Medications modified:  LANTUS  30 UNITS AT BEDTIME--------->35 UNITS AT BEDTIME    Medications to be removed:  ASPIRIN 81MG  Medications of concern:      Prior to Admission Medications   Prescriptions Last Dose Informant Patient Reported? Taking?   HYDROmorphone (Dilaudid) 2 mg tablet 1/29/2025  No Yes   Sig: Take 1 tablet (2 mg) by mouth every 3 hours if needed for severe pain (7 - 10).   acyclovir (Zovirax) 800 mg tablet 1/28/2025  No Yes   Sig: Take 1 tablet (800 mg) by mouth once daily.   albuterol 90 mcg/actuation inhaler Past Month  No Yes   Sig: Inhale 2 puffs every 4 hours if needed for wheezing or shortness of breath.   amLODIPine (Norvasc) 10 mg tablet 1/29/2025  No Yes   Sig: Take 1 tablet (10 mg) by mouth once daily.   apixaban (Eliquis) 5 mg (74 tabs) tablet   No No   Sig: Take 2 tablets by mouth twice daily for 7 days then take 1 tablet by mouth daily thereafter   aspirin 81 mg EC tablet 1/28/2025  No Yes   Sig: Take 1 tablet (81 mg) by mouth once daily.   baclofen (Lioresal) 5 mg tablet More than a month  No No   Sig: Take 1 tablet (5 mg) by mouth every 8 hours if needed for muscle spasms.   Patient not taking: Reported on 1/29/2025   calcium carbonate-vitamin D3 (Calcium 600 + D,3,) 600 mg-5 mcg (200 unit) tablet 1/29/2025  No Yes   Sig: Take 1 tablet by mouth 2 times a day.   cholecalciferol (Vitamin D-3) 50 mcg (2,000 unit) capsule 1/29/2025  No Yes   Sig: Take 1 capsule (50 mcg) by mouth once every 24  hours.   furosemide (Lasix) 20 mg tablet 1/29/2025  No Yes   Sig: Take 1 tablet (20 mg) by mouth once daily as needed (LE swelling).   insulin glargine (Lantus U-100 Insulin) 100 unit/mL injection Past Week  No Yes   Sig: Inject 30 Units under the skin once daily at bedtime. Take as directed per insulin instructions.   insulin lispro 100 unit/mL injection 1/29/2025  No Yes   Sig: Inject 0-5 Units under the skin 3 times daily (morning, midday, late afternoon). As directed by sliding scale  Do not hold when patient is not eating, continue order as scheduled for hyperglycemia management.  Insulin Lispro Corrective Scale #1     Hypoglycemia protocol Call LIP unit(s) if Blood Glucose is between 0 - 70 mg/dL    0 unit(s) if Blood glucose is between   1 unit(s) if Blood glucose is between 151-200  2 unit(s) if Blood glucose is between 201-250  3 unit(s) if Blood glucose is between 251-300  4 unit(s) if Blood glucose is between 301-350  5 unit(s) if Blood glucose is between 351-400    If blood glucose is greater than 400 mg/dL, give max insulin per sliding scale AND then contact provider.   labetalol (Normodyne) 200 mg tablet 1/29/2025  No Yes   Sig: Take 1 tablet (200 mg) by mouth every 12 hours.   losartan (Cozaar) 25 mg tablet 1/29/2025  No Yes   Sig: Take 1 tablet (25 mg) by mouth once daily.   magnesium oxide (Mag-Ox) 400 mg tablet 1/29/2025  No Yes   Sig: Take 1 tablet (400 mg) by mouth once daily.   omeprazole (PriLOSEC) 40 mg DR capsule 1/29/2025  No Yes   Sig: Take 1 capsule (40 mg) by mouth 2 times a day before meals. Do not crush or chew.   ondansetron (Zofran) 4 mg tablet 1/29/2025  No Yes   Sig: Take 1 tablet (4 mg) by mouth every 6 hours if needed for nausea or vomiting.   vit A,C and E-lutein-minerals (Ocuvite with Lutein) 300 mcg-200 mg-27 mg-2 mg tablet 1/29/2025  No Yes   Sig: Take 1 tablet by mouth once daily.      Facility-Administered Medications: None       Paris Harris

## 2025-01-31 NOTE — PROGRESS NOTES
"Alejandra Smith is a 74 y.o. female on day 2 of admission presenting with COPD exacerbation (Multi).  Metastatic small cell lung cancer, anemia, leukopenia, left pleural effusion  Subjective   Patient is feeling better no any new complaint       Objective     Physical Exam  Afebrile     Neck supple  Lungs examination did show decreased breath sounds on the both side, started for left chest Pleurx catheter     Heart with normal regular rhythm     Abdomen is soft, nontender no masses palpable     Extremity no edema no calf tenderness  Last Recorded Vitals  Blood pressure 135/54, pulse 57, temperature 36.1 °C (97 °F), temperature source Temporal, resp. rate 17, height 1.702 m (5' 7\"), weight 104 kg (228 lb 4.8 oz), SpO2 96%.  Intake/Output last 3 Shifts:  I/O last 3 completed shifts:  In: 500 (4.8 mL/kg) [P.O.:500]  Out: 2500 (24.1 mL/kg) [Urine:2500 (1 mL/kg/hr)]  Weight: 103.6 kg     Relevant Results    Latest Reference Range & Units 01/31/25 05:22   WBC 4.4 - 11.3 x10*3/uL 8.8   nRBC 0.0 - 0.0 /100 WBCs 0.2 (H)   RBC 4.00 - 5.20 x10*6/uL 2.58 (L)   HEMOGLOBIN 12.0 - 16.0 g/dL 7.5 (L)   HEMATOCRIT 36.0 - 46.0 % 22.7 (L)   MCV 80 - 100 fL 88   MCH 26.0 - 34.0 pg 29.1   MCHC 32.0 - 36.0 g/dL 33.0   RED CELL DISTRIBUTION WIDTH 11.5 - 14.5 % 19.3 (H)   Platelets 150 - 450 x10*3/uL 136 (L)                   Assessment/Plan   #1 metastatic small cell lung cancer on carboplatin -16 and atezolizumab     2.  Pancytopenia due to chemotherapy  3.  Small pulmonary embolism     4.  Malignant pleural effusion     5.  Electrolyte imbalance    Clinically stable, leukopenia has resolved hemoglobin platelets count is stable continue supportive care, thoracentesis today    Follow-up by primary oncology service discussed with patient  I spent 30 minutes in the professional and overall care of this patient.      Liss Read MD      "

## 2025-02-01 VITALS
RESPIRATION RATE: 18 BRPM | WEIGHT: 228.3 LBS | TEMPERATURE: 97.3 F | BODY MASS INDEX: 35.83 KG/M2 | DIASTOLIC BLOOD PRESSURE: 66 MMHG | SYSTOLIC BLOOD PRESSURE: 178 MMHG | HEIGHT: 67 IN | OXYGEN SATURATION: 98 % | HEART RATE: 87 BPM

## 2025-02-01 RX ORDER — LABETALOL HYDROCHLORIDE 5 MG/ML
10 INJECTION, SOLUTION INTRAVENOUS ONCE
Status: DISCONTINUED | OUTPATIENT
Start: 2025-02-01 | End: 2025-02-01 | Stop reason: HOSPADM

## 2025-02-01 NOTE — NURSING NOTE
LATE ENTRY--0255:    Patients /60 by this nurse upon arrival of transport service. BP elevated to 200/66 when checked by transport at time of discharge. Call placed to [facility], order received for IV Labetalol nd order givento cancel discharge and postpone until later during the day. Pt's informed of new orders. Pt states she does not want IV to have to be replaced, would prefer oral and states she is leaving now even if she has to do so AMA. 2nd call placed to [facility] to relay message. IMS states to allow pt to sign AMA paperwork and to follow up promptly with her physician regarding the elevated BP. AMA paper signed. Pt discharged at this time.

## 2025-02-25 LAB
ATRIAL RATE: 0 BPM
P AXIS: 0 DEGREES
PR INTERVAL: 143 MS
Q ONSET: 251 MS
QRS COUNT: 10 BEATS
QRS DURATION: 102 MS
QT INTERVAL: 419 MS
QTC CALCULATION(BAZETT): 415 MS
QTC FREDERICIA: 416 MS
R AXIS: 12 DEGREES
T AXIS: 87 DEGREES
T OFFSET: 460 MS
VENTRICULAR RATE: 59 BPM